# Patient Record
Sex: FEMALE | Race: WHITE | Employment: OTHER | URBAN - METROPOLITAN AREA
[De-identification: names, ages, dates, MRNs, and addresses within clinical notes are randomized per-mention and may not be internally consistent; named-entity substitution may affect disease eponyms.]

---

## 2017-01-31 ENCOUNTER — TELEPHONE (OUTPATIENT)
Dept: NEUROLOGY | Facility: CLINIC | Age: 77
End: 2017-01-31

## 2017-01-31 NOTE — TELEPHONE ENCOUNTER
Trice Han sent a note to Dr Refugio Cavazos him that her back is much improved since her December 20, 2016 injection. She has a follow up appt scheduled for May 2017.   Note put in nurses bin

## 2017-01-31 NOTE — TELEPHONE ENCOUNTER
Note states\"back is much improved since injection on Dec 20. I have appt at the beginning of May when I return. \" Bilateral L5 TFESI on 12/20/16. NOV 5/8/17.

## 2017-05-03 ENCOUNTER — TELEPHONE (OUTPATIENT)
Dept: NEUROLOGY | Facility: CLINIC | Age: 77
End: 2017-05-03

## 2017-05-03 NOTE — TELEPHONE ENCOUNTER
Patient informed she will be evaluated on 5/8/17 and if Dr. Brit Shaffer determines she need an injection at that time once authorization is obtained we will schedule her. Patient expressed understanding.

## 2017-05-08 ENCOUNTER — OFFICE VISIT (OUTPATIENT)
Dept: NEUROLOGY | Facility: CLINIC | Age: 77
End: 2017-05-08

## 2017-05-08 VITALS
DIASTOLIC BLOOD PRESSURE: 88 MMHG | HEART RATE: 71 BPM | BODY MASS INDEX: 24.29 KG/M2 | RESPIRATION RATE: 17 BRPM | OXYGEN SATURATION: 98 % | HEIGHT: 62 IN | SYSTOLIC BLOOD PRESSURE: 160 MMHG | WEIGHT: 132 LBS

## 2017-05-08 DIAGNOSIS — M48.061 LUMBAR FORAMINAL STENOSIS: ICD-10-CM

## 2017-05-08 DIAGNOSIS — M43.16 SPONDYLOLISTHESIS OF LUMBAR REGION: ICD-10-CM

## 2017-05-08 DIAGNOSIS — M43.16 SPONDYLOLISTHESIS, LUMBAR REGION: ICD-10-CM

## 2017-05-08 DIAGNOSIS — M48.061 LUMBAR STENOSIS: ICD-10-CM

## 2017-05-08 DIAGNOSIS — M54.17 LUMBOSACRAL RADICULOPATHY AT L5: Primary | ICD-10-CM

## 2017-05-08 DIAGNOSIS — M51.26 LUMBAR HERNIATED DISC: ICD-10-CM

## 2017-05-08 PROCEDURE — 99214 OFFICE O/P EST MOD 30 MIN: CPT | Performed by: PHYSICAL MEDICINE & REHABILITATION

## 2017-05-08 RX ORDER — HYDROCODONE BITARTRATE AND ACETAMINOPHEN 5; 325 MG/1; MG/1
1 TABLET ORAL EVERY 4 HOURS PRN
COMMUNITY
End: 2017-08-21

## 2017-05-08 RX ORDER — HYDROCODONE BITARTRATE AND ACETAMINOPHEN 5; 325 MG/1; MG/1
1 TABLET ORAL EVERY 8 HOURS PRN
Qty: 90 TABLET | Refills: 0 | Status: SHIPPED | OUTPATIENT
Start: 2017-07-07 | End: 2017-07-17

## 2017-05-08 RX ORDER — MONTELUKAST SODIUM 10 MG/1
TABLET ORAL
Refills: 3 | COMMUNITY
Start: 2017-03-15 | End: 2019-10-09 | Stop reason: ALTCHOICE

## 2017-05-08 RX ORDER — HYDROCODONE BITARTRATE AND ACETAMINOPHEN 5; 325 MG/1; MG/1
1 TABLET ORAL EVERY 8 HOURS PRN
Qty: 90 TABLET | Refills: 0 | Status: SHIPPED | OUTPATIENT
Start: 2017-06-07 | End: 2017-08-21

## 2017-05-08 RX ORDER — DESVENLAFAXINE 50 MG/1
50 TABLET, EXTENDED RELEASE ORAL
Refills: 2 | COMMUNITY
Start: 2017-03-20 | End: 2017-05-08

## 2017-05-08 RX ORDER — HYDROCODONE BITARTRATE AND ACETAMINOPHEN 5; 325 MG/1; MG/1
1 TABLET ORAL EVERY 8 HOURS PRN
Qty: 90 TABLET | Refills: 0 | Status: SHIPPED | OUTPATIENT
Start: 2017-05-08 | End: 2017-11-16

## 2017-05-08 NOTE — PROGRESS NOTES
Patient has been scheduled for a bilateral L5 TFESIs  on 5/12/2017 at the [facility]. Medications and allergies reviewed. Patient informed to hold aspirins, nsaids, blood thinners, vitamins and fish oils 5-7 days prior to procedure.  Patient informed we will need

## 2017-05-08 NOTE — PROGRESS NOTES
HPI:   Kian Lee is a 68year old female.     Patient presents with:  Low Back Pain: pt had back injection 12/20/16, LOV 10/28/16, c/o lower back pain at the time, started with pain again x 1 mon and is gradually getting worse, pt would like an appt f bladder     Musculoskeletal: Positive for back pain. Skin: Negative for itching and rash. Neurological: Negative for tingling, sensory change and headaches. Endo/Heme/Allergies: Positive for environmental allergies.    Psychiatric/Behavioral: Negative FOREARM/WRIST SURGERY UNLISTED Right     Comment remove plate from wrist        Social History   Marital Status:   Spouse Name: N/A    Years of Education: N/A  Number of Children: N/A     Occupational History  None on file     Social History Main To Disp: 90 tablet Rfl: 1   Metoprolol Succinate  MG Oral Tablet 24 Hr Take 1 tablet (100 mg total) by mouth once daily. Disp: 90 tablet Rfl: 3   celecoxib (CELEBREX) 200 MG Oral Cap Take 1 capsule (200 mg total) by mouth daily.  Disp: 90 capsule Rfl: 3 reactive to light. No redness or discharge bilaterally. Skin:  There are no rashes or lesions.     Lymph Nodes:  The patient has no palpable submandibular, supraclavicular, and cervical lymph nodes    Lumbar Spine:    Scoliosis: Thoracic levoscoliosis th foraminal stenosis    5. L4-5 mod central HNP, L1-2 left mod HNP    6. Spondylolisthesis, lumbar region        Plan:   1. Schedule Bilateral L5 TESI under fluroscopy for Friday 5/12/2017  2. Pt has been off all nsaid asa vitamins x 1 week  3.  Refill hydro-

## 2017-05-12 ENCOUNTER — OFFICE VISIT (OUTPATIENT)
Dept: SURGERY | Facility: CLINIC | Age: 77
End: 2017-05-12

## 2017-05-12 DIAGNOSIS — M48.061 LUMBAR STENOSIS: ICD-10-CM

## 2017-05-12 DIAGNOSIS — M54.17 LUMBOSACRAL RADICULOPATHY AT L5: Primary | ICD-10-CM

## 2017-05-12 PROCEDURE — 64483 NJX AA&/STRD TFRM EPI L/S 1: CPT | Performed by: PHYSICAL MEDICINE & REHABILITATION

## 2017-05-12 NOTE — PROCEDURES
[address]    BILATERAL LUMBAR TRANSFORAMINAL   NAME:  Jai Romo    MR #:    [id number] :  1940     PHYSICIAN:  Karlene Wheatley        Operative Report    DATE OF PROCEDURE: 2017   PREOPERATIVE DIAGNOSES: 1. left > patient's skin was anesthetized with 1 to 2 cc of 50:50 mixture of sodium bicarbonate and 1% PF lidocaine without epinephrine. Then, a 3.5 inch, 22-gauge spinal needle was inserted and directed towards the left L5-S1 intervertebral foramen.   When it felt

## 2017-06-13 ENCOUNTER — HOSPITAL ENCOUNTER (OUTPATIENT)
Dept: MAMMOGRAPHY | Age: 77
Discharge: HOME | End: 2017-06-13
Attending: OBSTETRICS & GYNECOLOGY
Payer: MEDICARE

## 2017-06-13 DIAGNOSIS — Z12.31 ENCOUNTER FOR SCREENING MAMMOGRAM FOR MALIGNANT NEOPLASM OF BREAST: ICD-10-CM

## 2017-06-13 PROCEDURE — 77067 SCR MAMMO BI INCL CAD: CPT | Performed by: OBSTETRICS & GYNECOLOGY

## 2017-08-21 RX ORDER — HYDROCODONE BITARTRATE AND ACETAMINOPHEN 5; 325 MG/1; MG/1
1 TABLET ORAL EVERY 8 HOURS PRN
Qty: 90 TABLET | Refills: 0 | Status: SHIPPED | OUTPATIENT
Start: 2017-08-21 | End: 2017-11-03 | Stop reason: ALTCHOICE

## 2017-08-21 NOTE — TELEPHONE ENCOUNTER
Per [facility] refill policy one month supply on narcotics unless otherwise specified by prescribing physician    Medication request: Norco 5-325mg q8h prn pain    LOV 5/8/17 had bilateral L5 TFESIs on 5/12/17 (follow up in 3 months)  No follow up scheduled    [place]

## 2017-08-25 NOTE — TELEPHONE ENCOUNTER
Patient here to  prescription. Patient states \"I have about had it, I need a 3 month supply, I am going on vacation to my summer house in [place] and I am in pain. \"  I advised patient Dr Nba Ko wrote prescription for 1 month supply and will relay

## 2017-08-28 RX ORDER — HYDROCODONE BITARTRATE AND ACETAMINOPHEN 5; 325 MG/1; MG/1
1 TABLET ORAL EVERY 8 HOURS PRN
Qty: 90 TABLET | Refills: 0 | Status: SHIPPED | OUTPATIENT
Start: 2017-08-28 | End: 2017-08-29 | Stop reason: RX

## 2017-08-28 RX ORDER — HYDROCODONE BITARTRATE AND ACETAMINOPHEN 5; 325 MG/1; MG/1
1 TABLET ORAL EVERY 8 HOURS PRN
Qty: 90 TABLET | Refills: 0 | Status: SHIPPED | OUTPATIENT
Start: 2017-10-20 | End: 2017-11-03 | Stop reason: ALTCHOICE

## 2017-08-29 NOTE — TELEPHONE ENCOUNTER
There was an error with the start date on the prescription dated 8/28/17. Date changed to reflect correct start date. Incorrect rx shredded appropriately.

## 2017-08-31 ENCOUNTER — TELEPHONE (OUTPATIENT)
Dept: NEUROLOGY | Facility: CLINIC | Age: 77
End: 2017-08-31

## 2017-08-31 DIAGNOSIS — M51.26 BULGING LUMBAR DISC: ICD-10-CM

## 2017-08-31 DIAGNOSIS — M48.061 LUMBAR STENOSIS: ICD-10-CM

## 2017-08-31 DIAGNOSIS — M54.17 LUMBOSACRAL RADICULOPATHY AT L5: Primary | ICD-10-CM

## 2017-08-31 DIAGNOSIS — M43.16 SPONDYLOLISTHESIS OF LUMBAR REGION: ICD-10-CM

## 2017-08-31 DIAGNOSIS — M48.061 LUMBAR FORAMINAL STENOSIS: ICD-10-CM

## 2017-08-31 RX ORDER — HYDROCODONE BITARTRATE AND ACETAMINOPHEN 5; 325 MG/1; MG/1
1 TABLET ORAL EVERY 8 HOURS PRN
Qty: 90 TABLET | Refills: 0 | Status: SHIPPED | OUTPATIENT
Start: 2017-09-20 | End: 2017-10-20

## 2017-09-08 ENCOUNTER — TELEPHONE (OUTPATIENT)
Dept: NEUROLOGY | Facility: CLINIC | Age: 77
End: 2017-09-08

## 2017-09-08 NOTE — TELEPHONE ENCOUNTER
Medicare Online for insurance coverage of bilateral L5 TFESIs cpt codes [id number], [id number]. Insurance was verified and procedure is a covered benefit and does not require authorization. Will inform Nursing.

## 2017-09-08 NOTE — TELEPHONE ENCOUNTER
Pt called stated that she is having recurrent low back pain. Pain origins in low back to both hips worsen on left. Pt stated that pain is constant varies in degree. Pt is using Norco 3-3.5 tabs per day.  Pt would like to schedule injection end of Sept, 09/2

## 2017-09-11 NOTE — TELEPHONE ENCOUNTER
Patient has been scheduled for a bilateral L5 TFESIs  on 9/29/17 at the [facility]. Medications and allergies reviewed. Patient informed to hold aspirins, nsaids, blood thinners, vitamins and fish oils 5-7 days prior to procedure.  Patient informed we will need v

## 2017-09-15 NOTE — TELEPHONE ENCOUNTER
She called a couple days ago to try to move up her injection. She hasn't gotten a return call. She is hoping for her injection to be moved to 9-22-17, if possible.   Call [phone]

## 2017-09-15 NOTE — TELEPHONE ENCOUNTER
Patient informed she will be placed on wait list and if sooner appt for injection becomes available we will notify her. Patient verbalized understanding.

## 2017-09-21 ENCOUNTER — TELEPHONE (OUTPATIENT)
Dept: NEUROLOGY | Facility: CLINIC | Age: 77
End: 2017-09-21

## 2017-09-29 ENCOUNTER — OFFICE VISIT (OUTPATIENT)
Dept: SURGERY | Facility: CLINIC | Age: 77
End: 2017-09-29

## 2017-09-29 DIAGNOSIS — M48.061 LUMBAR STENOSIS: ICD-10-CM

## 2017-09-29 DIAGNOSIS — M54.17 LUMBOSACRAL RADICULOPATHY AT L5: Primary | ICD-10-CM

## 2017-09-29 PROCEDURE — 64483 NJX AA&/STRD TFRM EPI L/S 1: CPT | Performed by: PHYSICAL MEDICINE & REHABILITATION

## 2017-09-29 NOTE — PROCEDURES
[address]j [address].    BILATERAL LUMBAR TRANSFORAMINAL   NAME:  Amy Thomas    MR #:    [id number] :  1940     PHYSICIAN:  Nic Wheatley        Operative Report    DATE OF PROCEDURE: 2017   PREOPERATIVE DIAGNOSES: 1. left > patient's skin was anesthetized with 1 to 2 cc of 50:50 mixture of sodium bicarbonate and 1% PF lidocaine without epinephrine. Then, a 3.5 inch, 22-gauge spinal needle was inserted and directed towards the left L5-S1 intervertebral foramen.   When it felt

## 2017-11-03 PROBLEM — E66.3 OVERWEIGHT (BMI 25.0-29.9): Status: ACTIVE | Noted: 2017-11-03

## 2017-11-16 NOTE — TELEPHONE ENCOUNTER
Per [facility] refill policy there are only one month supply of refills at a time for narcotic medications unless otherwise specified by prescribing physician    Medication request: Norco 5-325mg q8h prn pain    LOV 5/12/17 had bilateral L5 TFESIs on 9/29/17  NOV

## 2017-11-17 RX ORDER — HYDROCODONE BITARTRATE AND ACETAMINOPHEN 5; 325 MG/1; MG/1
1 TABLET ORAL EVERY 8 HOURS PRN
Qty: 90 TABLET | Refills: 0 | Status: SHIPPED | OUTPATIENT
Start: 2017-11-25 | End: 2017-12-07

## 2017-12-07 ENCOUNTER — TELEPHONE (OUTPATIENT)
Dept: NEUROLOGY | Facility: CLINIC | Age: 77
End: 2017-12-07

## 2017-12-07 ENCOUNTER — OFFICE VISIT (OUTPATIENT)
Dept: NEUROLOGY | Facility: CLINIC | Age: 77
End: 2017-12-07

## 2017-12-07 VITALS
WEIGHT: 135 LBS | DIASTOLIC BLOOD PRESSURE: 100 MMHG | HEART RATE: 80 BPM | RESPIRATION RATE: 14 BRPM | BODY MASS INDEX: 27 KG/M2 | SYSTOLIC BLOOD PRESSURE: 140 MMHG

## 2017-12-07 DIAGNOSIS — M54.17 LUMBOSACRAL RADICULOPATHY AT L5: Primary | ICD-10-CM

## 2017-12-07 DIAGNOSIS — M51.26 LUMBAR HERNIATED DISC: ICD-10-CM

## 2017-12-07 DIAGNOSIS — M43.10 RETROLISTHESIS OF VERTEBRAE: ICD-10-CM

## 2017-12-07 DIAGNOSIS — M51.26 BULGING LUMBAR DISC: ICD-10-CM

## 2017-12-07 DIAGNOSIS — M48.062 SPINAL STENOSIS OF LUMBAR REGION WITH NEUROGENIC CLAUDICATION: ICD-10-CM

## 2017-12-07 DIAGNOSIS — M43.16 SPONDYLOLISTHESIS OF LUMBAR REGION: ICD-10-CM

## 2017-12-07 DIAGNOSIS — M48.061 LUMBAR FORAMINAL STENOSIS: ICD-10-CM

## 2017-12-07 PROCEDURE — 99214 OFFICE O/P EST MOD 30 MIN: CPT | Performed by: PHYSICAL MEDICINE & REHABILITATION

## 2017-12-07 RX ORDER — HYDROCODONE BITARTRATE AND ACETAMINOPHEN 5; 325 MG/1; MG/1
1 TABLET ORAL EVERY 8 HOURS PRN
Qty: 90 TABLET | Refills: 0 | Status: SHIPPED | OUTPATIENT
Start: 2018-02-23 | End: 2018-03-20

## 2017-12-07 RX ORDER — HYDROCODONE BITARTRATE AND ACETAMINOPHEN 5; 325 MG/1; MG/1
1 TABLET ORAL EVERY 8 HOURS PRN
Qty: 90 TABLET | Refills: 0 | Status: SHIPPED | OUTPATIENT
Start: 2017-12-25 | End: 2018-03-20

## 2017-12-07 RX ORDER — HYDROCODONE BITARTRATE AND ACETAMINOPHEN 5; 325 MG/1; MG/1
1 TABLET ORAL EVERY 8 HOURS PRN
Qty: 90 TABLET | Refills: 0 | Status: SHIPPED | OUTPATIENT
Start: 2018-01-24 | End: 2018-02-23

## 2017-12-07 NOTE — TELEPHONE ENCOUNTER
Medicare Online for insurance coverage of bilateral L5 TFESIs cpt codes [id number], [id number]. Insurance was verified and procedure is a covered benefit and does not require authorization. Pt. is scheduled for procedure on 12/19/17. Will inform Nursing.

## 2017-12-07 NOTE — PROGRESS NOTES
Low Back Pain H & P    Chief Complaint: Patient presents with:  Low Back Pain: pt here for follow up after Bilateral L5 transforaminal epidural steroid injections on 9/29/17 with 80% relief that has just started to come back.  pain is located in the lower b Right      Comment: remove plate from wrist   4/2215: [facility] NEEDLE LOCALIZATION W/ SPECIMEN 1 SITE RIG*  No date: OTHER SURGICAL HISTORY      Comment: ankle ligament repair  No date: OTHER SURGICAL HISTORY      Comment: deviated septum repair as child  No da History Narrative    Does use assistive device. Uses cane some of the time. Splint to r wrist. Soft collar neck. Has 12 stairs in home which she must use. Some days are more difficult than others. Review of Systems      PE:   The patient does 5. L5-S1 left severe/right mod-severe, L4-5 right mod foraminal stenosis    6. L5-S1 left mild-mod/rightmod-large foraminal, L3-4 right mod far lateral, L2-3 right mod bulging discs    7.  L4-5 grade 2 spondylolisthesis          Plan  I will perform bilat

## 2017-12-07 NOTE — PATIENT INSTRUCTIONS
As of October 6th 2014, the Drug Enforcement Agency [facility]) is reclassifying all hydrocodone combination medications from Schedule III to Schedule II. This includes medications such as Norco, Vicodin, Lortab, Zohydro, and Vicoprofen.     What this means for y will perform bilateral L5 TFESI(s). She will continue with the Ti [place] and the massage therapy. The patient will continue with their current pain medications. She will follow up in April or May of 2018.

## 2017-12-07 NOTE — PROGRESS NOTES
Patient has been scheduled for a bilateral L5 TFESI  on 12/191/7 at the [facility]. Medications and allergies reviewed. Patient informed to hold aspirins, nsaids, blood thinners, vitamins and fish oils 5-7 days prior to procedure.  Patient informed we will need v

## 2017-12-19 ENCOUNTER — OFFICE VISIT (OUTPATIENT)
Dept: SURGERY | Facility: CLINIC | Age: 77
End: 2017-12-19

## 2017-12-19 DIAGNOSIS — M51.26 LUMBAR HERNIATED DISC: ICD-10-CM

## 2017-12-19 DIAGNOSIS — M48.062 SPINAL STENOSIS OF LUMBAR REGION WITH NEUROGENIC CLAUDICATION: ICD-10-CM

## 2017-12-19 DIAGNOSIS — M54.17 LUMBOSACRAL RADICULOPATHY AT L5: Primary | ICD-10-CM

## 2017-12-19 PROCEDURE — 64483 NJX AA&/STRD TFRM EPI L/S 1: CPT | Performed by: PHYSICAL MEDICINE & REHABILITATION

## 2017-12-19 NOTE — PROCEDURES
[address].    BILATERAL LUMBAR TRANSFORAMINAL   NAME:  Jai Romo    MR #:    [id number] :  1940     PHYSICIAN:  Karlene Wheatley        Operative Report    DATE OF PROCEDURE: 2017   PREOPERATIVE DIAGNOSES: 1. left anterior obliqued opening up the left L5-S1 intervertebral foramen. At this point in time, the patient's skin was anesthetized with 1 to 2 cc of 50:50 mixture of sodium bicarbonate and 1% PF lidocaine without epinephrine.   Then, a 3.5 inch, 22-gauge spina

## 2018-02-21 ENCOUNTER — TELEPHONE (OUTPATIENT)
Dept: NEUROLOGY | Facility: CLINIC | Age: 78
End: 2018-02-21

## 2018-02-21 NOTE — TELEPHONE ENCOUNTER
I was paged by pharmacist at [facility] in [address] [facility] about he patient having a Norco prescription that was not signed. She was given by our office a prescription dated for 2/23/18.

## 2018-03-20 ENCOUNTER — TELEPHONE (OUTPATIENT)
Dept: NEUROLOGY | Facility: CLINIC | Age: 78
End: 2018-03-20

## 2018-03-20 NOTE — TELEPHONE ENCOUNTER
Medication request: Norco 5-325mg q8h prn pain    LOV 12/7/18-had inj 12/19/18  NOV 5/3/18    [place] 3/2/18 #90 (Cachorro Avalos)

## 2018-03-20 NOTE — TELEPHONE ENCOUNTER
Pt states that she is out of town until the end of April and discussed with Dr. Tarsha Ruelas up 2 Rxs for Con Stein, one being post dated for next month, please advise           Pts son Nic Alegre will  Rx

## 2018-03-21 NOTE — TELEPHONE ENCOUNTER
Patient notified she will need to be evaluated in office by Dr. Elmer Wright first before injection can be scheduled.  Patient verbalized understanding and has appt 5/3/18 and if injection is ordered patient will be scheduled once we receive insurance approval to

## 2018-03-22 RX ORDER — HYDROCODONE BITARTRATE AND ACETAMINOPHEN 5; 325 MG/1; MG/1
1 TABLET ORAL EVERY 8 HOURS PRN
Qty: 90 TABLET | Refills: 0 | Status: SHIPPED | OUTPATIENT
Start: 2018-04-01 | End: 2018-05-01

## 2018-03-22 RX ORDER — HYDROCODONE BITARTRATE AND ACETAMINOPHEN 5; 325 MG/1; MG/1
1 TABLET ORAL EVERY 8 HOURS PRN
Qty: 90 TABLET | Refills: 0 | Status: SHIPPED | OUTPATIENT
Start: 2018-05-01 | End: 2018-05-03

## 2018-05-03 ENCOUNTER — TELEPHONE (OUTPATIENT)
Dept: NEUROLOGY | Facility: CLINIC | Age: 78
End: 2018-05-03

## 2018-05-03 ENCOUNTER — OFFICE VISIT (OUTPATIENT)
Dept: NEUROLOGY | Facility: CLINIC | Age: 78
End: 2018-05-03

## 2018-05-03 VITALS
HEIGHT: 61 IN | WEIGHT: 132 LBS | BODY MASS INDEX: 24.92 KG/M2 | DIASTOLIC BLOOD PRESSURE: 80 MMHG | RESPIRATION RATE: 18 BRPM | SYSTOLIC BLOOD PRESSURE: 134 MMHG | HEART RATE: 63 BPM

## 2018-05-03 DIAGNOSIS — M48.062 SPINAL STENOSIS OF LUMBAR REGION WITH NEUROGENIC CLAUDICATION: ICD-10-CM

## 2018-05-03 DIAGNOSIS — M43.16 SPONDYLOLISTHESIS OF LUMBAR REGION: ICD-10-CM

## 2018-05-03 DIAGNOSIS — M54.17 LUMBOSACRAL RADICULOPATHY AT L5: Primary | ICD-10-CM

## 2018-05-03 DIAGNOSIS — M51.26 BULGING LUMBAR DISC: ICD-10-CM

## 2018-05-03 DIAGNOSIS — M51.26 LUMBAR HERNIATED DISC: ICD-10-CM

## 2018-05-03 DIAGNOSIS — M43.10 RETROLISTHESIS OF VERTEBRAE: ICD-10-CM

## 2018-05-03 DIAGNOSIS — M48.061 LUMBAR FORAMINAL STENOSIS: ICD-10-CM

## 2018-05-03 PROCEDURE — 99214 OFFICE O/P EST MOD 30 MIN: CPT | Performed by: PHYSICAL MEDICINE & REHABILITATION

## 2018-05-03 RX ORDER — HYDROCODONE BITARTRATE AND ACETAMINOPHEN 5; 325 MG/1; MG/1
1 TABLET ORAL EVERY 8 HOURS PRN
Qty: 90 TABLET | Refills: 0 | Status: SHIPPED | OUTPATIENT
Start: 2018-06-03 | End: 2018-05-07

## 2018-05-03 RX ORDER — HYDROCODONE BITARTRATE AND ACETAMINOPHEN 5; 325 MG/1; MG/1
1 TABLET ORAL EVERY 8 HOURS PRN
Qty: 90 TABLET | Refills: 0 | Status: SHIPPED | OUTPATIENT
Start: 2018-08-02 | End: 2018-08-27

## 2018-05-03 RX ORDER — HYDROCODONE BITARTRATE AND ACETAMINOPHEN 5; 325 MG/1; MG/1
1 TABLET ORAL EVERY 8 HOURS PRN
Qty: 90 TABLET | Refills: 0 | Status: SHIPPED | OUTPATIENT
Start: 2018-07-03 | End: 2018-05-07
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
## 2018-05-03 NOTE — TELEPHONE ENCOUNTER
Medicare Online for insurance coverage of left L4 & L5 TFESIs cpt codes [id number], [id number], [id number]. Insurance was verified and procedure  is a covered benefit and does not require authorization. Pt. is scheduled for 05/04/18. .  Will inform Nursing.

## 2018-05-03 NOTE — PROGRESS NOTES
Patient was scheduled for left L4 and L5 TFESI on Friday, May 4, 2018. Medications and allergies reviewed. Patient informed to hold aspirins, nsaids, blood thinners, vitamins and fish oils 7 days prior to procedure.  Patient informed to hold metformin/xiao

## 2018-05-04 ENCOUNTER — OFFICE VISIT (OUTPATIENT)
Dept: SURGERY | Facility: CLINIC | Age: 78
End: 2018-05-04

## 2018-05-04 DIAGNOSIS — M51.26 LUMBAR HERNIATED DISC: ICD-10-CM

## 2018-05-04 DIAGNOSIS — M48.061 LUMBAR FORAMINAL STENOSIS: ICD-10-CM

## 2018-05-04 DIAGNOSIS — M54.17 LUMBOSACRAL RADICULOPATHY AT L5: Primary | ICD-10-CM

## 2018-05-04 PROCEDURE — 64484 NJX AA&/STRD TFRM EPI L/S EA: CPT | Performed by: PHYSICAL MEDICINE & REHABILITATION

## 2018-05-04 PROCEDURE — 64483 NJX AA&/STRD TFRM EPI L/S 1: CPT | Performed by: PHYSICAL MEDICINE & REHABILITATION

## 2018-05-04 NOTE — PROCEDURES
[address] WOL[address].    2-LEVEL LUMBAR TRANSFORAMINAL   NAME:  Todd Xavier    MR #:    [id number] :  1940     PHYSICIAN:  Collette Footman A. Couri        Operative Report    DATE OF PROCEDURE: 2018   PREOPERATIVE DIAGNOSES: 1. left > ri solution of 1.5 cc of 40 mg/cc of Kenalog and 1.5 cc of 1% PF lidocaine without epinephrine at each site. After this, the needles were removed. Each site was cleaned. Band-Aids were applied. The patient was transferred to the cart and into [facility].   The pa

## 2018-05-21 ENCOUNTER — TELEPHONE (OUTPATIENT)
Dept: NEUROLOGY | Facility: CLINIC | Age: 78
End: 2018-05-21

## 2018-05-21 NOTE — TELEPHONE ENCOUNTER
Patient calling with condition update post left L4 and L5 TFESIs 5/4/18  Patient states she received 80-85% relief in symptoms. Denies pain in the thoracic back but has minimal pain in the left hip and leg with certain activity.   Patient states she has sta

## 2018-07-11 ENCOUNTER — TELEPHONE (OUTPATIENT)
Dept: NEUROLOGY | Facility: CLINIC | Age: 78
End: 2018-07-11

## 2018-07-11 NOTE — TELEPHONE ENCOUNTER
Patient received 3 month supply on 5/3/18. Patient has norco 5/325 mg dated to start 8/2/18. No additional refills at this time.

## 2018-07-11 NOTE — TELEPHONE ENCOUNTER
Rafael Cassa pharmacist who states patient tried to fill a prescription for Norco 5-325mg #90 that was written 5/3/18 but per [place] law they are unable to fill prescriptions for controlled medications 60 days from the date it was written.

## 2018-07-17 ENCOUNTER — MED REC SCAN ONLY (OUTPATIENT)
Dept: NEUROLOGY | Facility: CLINIC | Age: 78
End: 2018-07-17

## 2018-08-23 ENCOUNTER — TELEPHONE (OUTPATIENT)
Dept: NEUROLOGY | Facility: CLINIC | Age: 78
End: 2018-08-23

## 2018-08-23 NOTE — TELEPHONE ENCOUNTER
Patient calling requesting a steroid injection as soon as possible. Patient's NOV is 8/27/18. Patient was informed that Dr. Jamee Ríos will evaluate her pain at [facility] and determine which injection to schedule. Spot has been held for patient at [facility] on 9/7/18.  Kodi Baca

## 2018-08-27 ENCOUNTER — OFFICE VISIT (OUTPATIENT)
Dept: NEUROLOGY | Facility: CLINIC | Age: 78
End: 2018-08-27
Payer: MEDICARE

## 2018-08-27 ENCOUNTER — TELEPHONE (OUTPATIENT)
Dept: NEUROLOGY | Facility: CLINIC | Age: 78
End: 2018-08-27

## 2018-08-27 VITALS — HEART RATE: 68 BPM | SYSTOLIC BLOOD PRESSURE: 122 MMHG | DIASTOLIC BLOOD PRESSURE: 70 MMHG | RESPIRATION RATE: 14 BRPM

## 2018-08-27 DIAGNOSIS — M43.10 RETROLISTHESIS OF VERTEBRAE: ICD-10-CM

## 2018-08-27 DIAGNOSIS — M54.17 LUMBOSACRAL RADICULOPATHY AT L5: Primary | ICD-10-CM

## 2018-08-27 DIAGNOSIS — M43.16 SPONDYLOLISTHESIS OF LUMBAR REGION: ICD-10-CM

## 2018-08-27 DIAGNOSIS — M48.062 SPINAL STENOSIS OF LUMBAR REGION WITH NEUROGENIC CLAUDICATION: ICD-10-CM

## 2018-08-27 DIAGNOSIS — M51.26 BULGING LUMBAR DISC: ICD-10-CM

## 2018-08-27 DIAGNOSIS — M48.061 LUMBAR FORAMINAL STENOSIS: ICD-10-CM

## 2018-08-27 DIAGNOSIS — Z96.642 STATUS POST LEFT HIP REPLACEMENT: ICD-10-CM

## 2018-08-27 PROCEDURE — 99214 OFFICE O/P EST MOD 30 MIN: CPT | Performed by: PHYSICAL MEDICINE & REHABILITATION

## 2018-08-27 RX ORDER — HYDROCODONE BITARTRATE AND ACETAMINOPHEN 5; 325 MG/1; MG/1
1 TABLET ORAL EVERY 8 HOURS PRN
Qty: 90 TABLET | Refills: 0 | Status: SHIPPED | OUTPATIENT
Start: 2018-08-27 | End: 2018-09-26

## 2018-08-27 RX ORDER — HYDROCODONE BITARTRATE AND ACETAMINOPHEN 5; 325 MG/1; MG/1
1 TABLET ORAL EVERY 8 HOURS PRN
Qty: 90 TABLET | Refills: 0 | Status: SHIPPED | OUTPATIENT
Start: 2018-09-26 | End: 2018-10-26

## 2018-08-27 RX ORDER — HYDROCODONE BITARTRATE AND ACETAMINOPHEN 5; 325 MG/1; MG/1
1 TABLET ORAL EVERY 8 HOURS PRN
Qty: 90 TABLET | Refills: 0 | Status: SHIPPED | OUTPATIENT
Start: 2018-10-26 | End: 2018-11-25

## 2018-08-27 NOTE — TELEPHONE ENCOUNTER
Medicare Online for insurance coverage of bilateral L5 TFESIs cpt codes [id number], [id number]. Insurance was verified and procedure is a covered benefit and does not require authorization. Procedure is scheduled on 09/07/18.  Will inform Nursing

## 2018-08-27 NOTE — PROGRESS NOTES
Low Back Pain H & P    Chief Complaint: Patient presents with:  Low Back Pain: pt here for follow up after Left L4 and L5 transforaminal epidural steroid injection on 5/4/18 with 100% relief for 3 weeks then symptoms slowly returned.  fell a couple months a Constipation    • HYPERLIPIDEMIA    • HYPERTENSION    • OSTEOPOROSIS     lumbar spine   • OTHER DISEASES     lumbar spinal stenosis   • OTHER DISEASES     L4/L5 spondlyolisthesis   • OTHER DISEASES     thoracic DJD   • OTHER DISEASES     fibrocystic breast Father    • Pulmonary Disease Father    • Hypertension Father    • Cancer Father      lymphoma   • Breast Cancer Paternal Aunt      post menopause       Social History     Social History  Social History   Marital status:   Spouse name: N/A    Years Hallucis Longus RIGHT:   4-/5  Extensor Hallucis Longus LEFT:   4-/5   RIGHT plantar reflexes: downward response   LEFT plantar reflexes: downward response   Reflexes: 2+ in bilateral lower extremities     Assessment  1. left > right L5 radiculopathy    2.

## 2018-08-27 NOTE — PATIENT INSTRUCTIONS
As of October 6th 2014, the Drug Enforcement Agency [facility]) is reclassifying all hydrocodone combination medications from Schedule III to Schedule II. This includes medications such as Norco, Vicodin, Lortab, Zohydro, and Vicoprofen.     What this means for y will perform bilateral L5 TFESI(s). The patient will continue with her home exercise program.    The patient will continue with their current pain medications.     The patient will follow up in 3 months, but the patient will call me 2 weeks after having

## 2018-08-27 NOTE — TELEPHONE ENCOUNTER
Patient has been scheduled for bilateral L5 TFESIs on 9/7/18 per telephone encounter on 8/23/18. Patient has been scheduled for a bilateral L5 TFESIs on 9/7/18 at the [facility]. Medications and allergies reviewed.  Patient informed to hold aspirins, nsaids, b

## 2018-09-07 ENCOUNTER — OFFICE VISIT (OUTPATIENT)
Dept: SURGERY | Facility: CLINIC | Age: 78
End: 2018-09-07
Payer: MEDICARE

## 2018-09-07 DIAGNOSIS — M48.062 SPINAL STENOSIS OF LUMBAR REGION WITH NEUROGENIC CLAUDICATION: ICD-10-CM

## 2018-09-07 DIAGNOSIS — M51.26 LUMBAR HERNIATED DISC: ICD-10-CM

## 2018-09-07 DIAGNOSIS — M51.26 BULGING LUMBAR DISC: ICD-10-CM

## 2018-09-07 DIAGNOSIS — M54.17 LUMBOSACRAL RADICULOPATHY AT L5: Primary | ICD-10-CM

## 2018-09-07 PROCEDURE — 64483 NJX AA&/STRD TFRM EPI L/S 1: CPT | Performed by: PHYSICAL MEDICINE & REHABILITATION

## 2018-09-07 NOTE — PROCEDURES
[address]    BILATERAL LUMBAR TRANSFORAMINAL   NAME:  Aliyah Morales    MR #:    [id number] :  1940     PHYSICIAN:  Antonia Wheatley        Operative Report    DATE OF PROCEDURE: 2018   PREOPERATIVE DIAGNOSES: 1. left > aspirated. Then, the patient was injected with a 3 cc solution of 1.5 cc of 40 mg/cc of Kenalog and 1.5 cc of 1% PF lidocaine without epinephrine. After this, the needle was removed.   Then  fluoroscopy was right anterior obliqued opening up the left L5-S

## 2018-11-05 ENCOUNTER — HOSPITAL ENCOUNTER (OUTPATIENT)
Dept: MAMMOGRAPHY | Facility: HOSPITAL | Age: 78
Discharge: HOME | End: 2018-11-05
Attending: OBSTETRICS & GYNECOLOGY
Payer: MEDICARE

## 2018-11-05 DIAGNOSIS — Z12.39 SCREENING FOR MALIGNANT NEOPLASM OF BREAST: ICD-10-CM

## 2018-11-05 PROCEDURE — 77067 SCR MAMMO BI INCL CAD: CPT | Performed by: OBSTETRICS & GYNECOLOGY

## 2018-11-05 PROCEDURE — 77063 BREAST TOMOSYNTHESIS BI: CPT | Performed by: OBSTETRICS & GYNECOLOGY

## 2018-11-12 ENCOUNTER — HOSPITAL ENCOUNTER (OUTPATIENT)
Dept: MAMMOGRAPHY | Facility: HOSPITAL | Age: 78
Discharge: HOME | End: 2018-11-12
Attending: OBSTETRICS & GYNECOLOGY
Payer: MEDICARE

## 2018-11-12 ENCOUNTER — HOSPITAL ENCOUNTER (OUTPATIENT)
Dept: ULTRASOUND IMAGING | Facility: HOSPITAL | Age: 78
Discharge: HOME | End: 2018-11-12
Attending: INTERNAL MEDICINE
Payer: MEDICARE

## 2018-11-12 DIAGNOSIS — R92.8 ABNORMAL MAMMOGRAM: ICD-10-CM

## 2018-11-12 DIAGNOSIS — Z01.818 PREOPERATIVE EXAMINATION: ICD-10-CM

## 2018-11-12 PROCEDURE — 76642 ULTRASOUND BREAST LIMITED: CPT | Performed by: INTERNAL MEDICINE

## 2018-11-12 PROCEDURE — 77065 DX MAMMO INCL CAD UNI: CPT | Performed by: OBSTETRICS & GYNECOLOGY

## 2018-11-12 PROCEDURE — 77061 BREAST TOMOSYNTHESIS UNI: CPT | Performed by: OBSTETRICS & GYNECOLOGY

## 2018-11-27 ENCOUNTER — TELEPHONE (OUTPATIENT)
Dept: NEUROLOGY | Facility: CLINIC | Age: 78
End: 2018-11-27

## 2018-11-27 ENCOUNTER — OFFICE VISIT (OUTPATIENT)
Dept: NEUROLOGY | Facility: CLINIC | Age: 78
End: 2018-11-27
Payer: MEDICARE

## 2018-11-27 VITALS
SYSTOLIC BLOOD PRESSURE: 132 MMHG | BODY MASS INDEX: 24.55 KG/M2 | DIASTOLIC BLOOD PRESSURE: 86 MMHG | HEIGHT: 61 IN | HEART RATE: 80 BPM | WEIGHT: 130 LBS

## 2018-11-27 DIAGNOSIS — M51.26 BULGING LUMBAR DISC: ICD-10-CM

## 2018-11-27 DIAGNOSIS — M43.10 RETROLISTHESIS OF VERTEBRAE: ICD-10-CM

## 2018-11-27 DIAGNOSIS — M43.16 SPONDYLOLISTHESIS OF LUMBAR REGION: ICD-10-CM

## 2018-11-27 DIAGNOSIS — M48.061 LUMBAR FORAMINAL STENOSIS: ICD-10-CM

## 2018-11-27 DIAGNOSIS — M54.17 LUMBOSACRAL RADICULOPATHY AT L5: Primary | ICD-10-CM

## 2018-11-27 DIAGNOSIS — M48.062 SPINAL STENOSIS OF LUMBAR REGION WITH NEUROGENIC CLAUDICATION: ICD-10-CM

## 2018-11-27 DIAGNOSIS — M51.26 LUMBAR HERNIATED DISC: ICD-10-CM

## 2018-11-27 PROCEDURE — 99214 OFFICE O/P EST MOD 30 MIN: CPT | Performed by: PHYSICAL MEDICINE & REHABILITATION

## 2018-11-27 RX ORDER — HYDROCODONE BITARTRATE AND ACETAMINOPHEN 5; 325 MG/1; MG/1
1 TABLET ORAL EVERY 8 HOURS PRN
Qty: 300 TABLET | Refills: 0 | Status: SHIPPED | OUTPATIENT
Start: 2018-12-27 | End: 2019-01-22

## 2018-11-27 RX ORDER — HYDROCODONE BITARTRATE AND ACETAMINOPHEN 5; 325 MG/1; MG/1
1 TABLET ORAL EVERY 6 HOURS PRN
COMMUNITY
End: 2018-11-27

## 2018-11-27 RX ORDER — HYDROCODONE BITARTRATE AND ACETAMINOPHEN 5; 325 MG/1; MG/1
1 TABLET ORAL EVERY 8 HOURS PRN
Qty: 90 TABLET | Refills: 0 | Status: SHIPPED | OUTPATIENT
Start: 2018-11-27 | End: 2018-11-27

## 2018-11-27 NOTE — PATIENT INSTRUCTIONS
Plan  I will perform bilateral L5 TFESI(s). She will continue with the Norco 2-3 tablets in one day for the pain. She will need a 4 month supply of the Norco when [place].     The patient will continue with her home exer

## 2018-11-27 NOTE — PROGRESS NOTES
Low Back Pain H & P    Chief Complaint: Patient presents with:  Low Back Pain: LOV 9-7-18 pt is here to f/u on lower back pain down to the L leg 5/10. Pt states lower back pain have being the same since last office visit takes Paz Lala as needed.  Had [employer] fibroids/hyperplasia       Past Surgical History   Past Surgical History:   Procedure Laterality Date   • CARPAL TUNNEL RELEASE Right 11/28/2016    Performed by Grant Tovar MD at [facility]   • CATARACT      bilateral, Dr Giovanni Pak Used    Substance and Sexual Activity      Alcohol use:  Yes        Alcohol/week: 0.0 oz        Comment: wine most days      Drug use: No      Sexual activity: Not on file    Other Topics      Concerns:         Service: Not Asked        Blood Transf Trochanteric Bursa     Vascular lower extremity:   Dorsalis pedis pulse-RIGHT 2+   Dorsalis pedis pulse-LEFT 2+   Tibialis posterior pulse-RIGHT 2+   Tibialis posterior pulse-LEFT 2+     Neurological Lower Extremity:    Light Touch Sensation: Intact in danny

## 2018-11-28 NOTE — TELEPHONE ENCOUNTER
Patient has been scheduled for a bilateral L5 TFESIs  on 12/14/18 at the [facility]. Medications and allergies reviewed. Patient informed to hold aspirins, nsaids, blood thinners, multivitamins, vitamin E and fish oils 3-7 days prior to procedure.  Patient inform

## 2018-11-28 NOTE — TELEPHONE ENCOUNTER
Medicare Online for insurance coverage of bilateral L5 TFESIs cpt codes [id number], [id number]. Insurance was verified and procedure is a covered benefit and does not require authorization.   Will inform Nursing

## 2018-11-29 ENCOUNTER — MED REC SCAN ONLY (OUTPATIENT)
Dept: NEUROLOGY | Facility: CLINIC | Age: 78
End: 2018-11-29

## 2018-12-14 ENCOUNTER — OFFICE VISIT (OUTPATIENT)
Dept: SURGERY | Facility: CLINIC | Age: 78
End: 2018-12-14
Payer: MEDICARE

## 2018-12-14 DIAGNOSIS — M54.17 LUMBOSACRAL RADICULOPATHY AT L5: Primary | ICD-10-CM

## 2018-12-14 DIAGNOSIS — M48.062 SPINAL STENOSIS OF LUMBAR REGION WITH NEUROGENIC CLAUDICATION: ICD-10-CM

## 2018-12-14 DIAGNOSIS — M51.26 LUMBAR HERNIATED DISC: ICD-10-CM

## 2018-12-14 PROCEDURE — 64483 NJX AA&/STRD TFRM EPI L/S 1: CPT | Performed by: PHYSICAL MEDICINE & REHABILITATION

## 2018-12-14 NOTE — PROCEDURES
[address].    BILATERAL LUMBAR TRANSFORAMINAL   NAME:  Darrel Crandall    MR #:    [id number] :  1940     PHYSICIAN:  Adriano Wheatley        Operative Report    DATE OF PROCEDURE: 2018   PREOPERATIVE DIAGNOSES: 1. left anterior obliqued opening up the left L5-S1 intervertebral foramen. At this point in time, the patient's skin was anesthetized with 1 to 2 cc of 1% PF lidocaine without epinephrine.   Then, a 3.5 inch, 22-gauge spinal needle was inserted and directed towar

## 2019-01-02 ENCOUNTER — TELEPHONE (OUTPATIENT)
Dept: NEUROLOGY | Facility: CLINIC | Age: 79
End: 2019-01-02

## 2019-01-02 NOTE — TELEPHONE ENCOUNTER
Pt called stated that she is going to [place] for 4 months and took rx for Alexia Fernandezd 300 tabs to [facility] to fill and they would not fill rx. Pt was given #90 tabs. Pt was told that the laws have changed as far as opoid rx.   She must have it filled in state it

## 2019-01-22 RX ORDER — HYDROCODONE BITARTRATE AND ACETAMINOPHEN 5; 325 MG/1; MG/1
1 TABLET ORAL EVERY 8 HOURS PRN
Qty: 90 TABLET | Refills: 0 | Status: SHIPPED | OUTPATIENT
Start: 2019-01-28 | End: 2019-02-27

## 2019-01-22 NOTE — TELEPHONE ENCOUNTER
Norco 5/325mg. Take 1 tablet every 8 hours prn pain. #90. No refills.      [place]    LOV-11/27/2018 NOV-5/6/2019

## 2019-02-27 NOTE — TELEPHONE ENCOUNTER
Medication request: HYDROcodone-acetaminophen 5-325 MG Oral Tab, #90, no refill  Take 1 tablet by mouth every 8 (eight) hours as needed for pain.     [place]: 2/1/19    LOV: 11/27/18  NOV: 5/6/19    Medication pended - routed to Dr. Sri Gutierrez

## 2019-02-28 RX ORDER — HYDROCODONE BITARTRATE AND ACETAMINOPHEN 5; 325 MG/1; MG/1
1 TABLET ORAL EVERY 8 HOURS PRN
Qty: 90 TABLET | Refills: 0 | Status: SHIPPED | OUTPATIENT
Start: 2019-03-03 | End: 2019-04-09

## 2019-03-27 ENCOUNTER — TELEPHONE (OUTPATIENT)
Dept: NEUROLOGY | Facility: CLINIC | Age: 79
End: 2019-03-27

## 2019-04-09 NOTE — TELEPHONE ENCOUNTER
Medication request: HYDROcodone-acetaminophen 5-325 MG Oral Tab, #90, no refill  Take 1 tablet by mouth every 8 (eight) hours as needed for pain.      [place]: 3/8/19    LOV: 11/27/18, 12/14/18 at [facility]: 4/29/19

## 2019-04-11 RX ORDER — HYDROCODONE BITARTRATE AND ACETAMINOPHEN 5; 325 MG/1; MG/1
1 TABLET ORAL EVERY 8 HOURS PRN
Qty: 90 TABLET | Refills: 0 | Status: SHIPPED | OUTPATIENT
Start: 2019-04-11 | End: 2019-04-29

## 2019-04-29 ENCOUNTER — OFFICE VISIT (OUTPATIENT)
Dept: NEUROLOGY | Facility: CLINIC | Age: 79
End: 2019-04-29
Payer: MEDICARE

## 2019-04-29 ENCOUNTER — HOSPITAL ENCOUNTER (OUTPATIENT)
Dept: GENERAL RADIOLOGY | Facility: HOSPITAL | Age: 79
Discharge: HOME | End: 2019-04-29
Attending: PHYSICAL MEDICINE & REHABILITATION
Payer: MEDICARE

## 2019-04-29 ENCOUNTER — TELEPHONE (OUTPATIENT)
Dept: NEUROLOGY | Facility: CLINIC | Age: 79
End: 2019-04-29

## 2019-04-29 VITALS — SYSTOLIC BLOOD PRESSURE: 120 MMHG | DIASTOLIC BLOOD PRESSURE: 78 MMHG | RESPIRATION RATE: 20 BRPM | HEART RATE: 84 BPM

## 2019-04-29 DIAGNOSIS — M43.16 SPONDYLOLISTHESIS OF LUMBAR REGION: ICD-10-CM

## 2019-04-29 DIAGNOSIS — M48.062 SPINAL STENOSIS OF LUMBAR REGION WITH NEUROGENIC CLAUDICATION: ICD-10-CM

## 2019-04-29 DIAGNOSIS — M54.6 ACUTE LEFT-SIDED THORACIC BACK PAIN: ICD-10-CM

## 2019-04-29 DIAGNOSIS — M51.26 BULGING LUMBAR DISC: ICD-10-CM

## 2019-04-29 DIAGNOSIS — M48.061 LUMBAR FORAMINAL STENOSIS: ICD-10-CM

## 2019-04-29 DIAGNOSIS — M43.10 RETROLISTHESIS OF VERTEBRAE: ICD-10-CM

## 2019-04-29 DIAGNOSIS — M54.17 LUMBOSACRAL RADICULOPATHY AT L5: Primary | ICD-10-CM

## 2019-04-29 DIAGNOSIS — S12.031S: ICD-10-CM

## 2019-04-29 DIAGNOSIS — M51.26 LUMBAR HERNIATED DISC: ICD-10-CM

## 2019-04-29 DIAGNOSIS — M54.17 LUMBOSACRAL RADICULOPATHY AT L5: ICD-10-CM

## 2019-04-29 PROCEDURE — 72100 X-RAY EXAM L-S SPINE 2/3 VWS: CPT | Performed by: PHYSICAL MEDICINE & REHABILITATION

## 2019-04-29 PROCEDURE — 99214 OFFICE O/P EST MOD 30 MIN: CPT | Performed by: PHYSICAL MEDICINE & REHABILITATION

## 2019-04-29 PROCEDURE — 72072 X-RAY EXAM THORAC SPINE 3VWS: CPT | Performed by: PHYSICAL MEDICINE & REHABILITATION

## 2019-04-29 RX ORDER — HYDROCODONE BITARTRATE AND ACETAMINOPHEN 5; 325 MG/1; MG/1
1 TABLET ORAL EVERY 8 HOURS PRN
Qty: 90 TABLET | Refills: 0 | Status: SHIPPED | OUTPATIENT
Start: 2019-05-06 | End: 2019-06-04

## 2019-04-29 NOTE — PROGRESS NOTES
Patient has been scheduled for a left L4 and L5 TFESIs  on 5/3/19 at the [facility]. Medications and allergies reviewed. Patient informed to hold aspirins, nsaids, blood thinners, multivitamins, vitamin E and fish oils 3-7 days prior to procedure.  Patient inform

## 2019-04-29 NOTE — PATIENT INSTRUCTIONS
As of October 6th 2014, the Drug Enforcement Agency [facility]) is reclassifying all hydrocodone combination medications from Schedule III to Schedule II. This includes medications such as Norco, Vicodin, Lortab, Zohydro, and Vicoprofen.     What this means for y will get thoracic and lumbar spine x-rays to look for a new compression fracture. She will call me after she has had these done and if there is no fracture, then I will do left L4 and L5 TFESI's.      If there is a compression fracture, I spoke to her

## 2019-04-29 NOTE — PROGRESS NOTES
Low Back Pain H & P    Chief Complaint: Patient presents with:  Low Back Pain: pt here for follow up after Bilateral L5 transforaminal epidural steroid injections 12/14/19 with 80% for 2 months and then pain returned to baseline.  c/o lower back radiating u 11/28/2016    Performed by Greg Lynn MD at [facility]   • [facility]   • CATARACT EXTRACTION Right 11/24/14   • D & C      x 2   • FOREARM/WRIST SURGERY UNLISTED Right     remove plate from wris Not on file    Lifestyle      Physical activity:        Days per week: Not on file        Minutes per session: Not on file      Stress: Not on file    Relationships      Social connections:        Talks on phone: Not on file        Gets together: Not on fi Resp: 20       Gait:    Gait: Normal gait   Sit to Stand: mild difficulty      Lumbar Spine:    Scoliosis: Thoracic levoscoliosis that is mild and Lumbar dextroscoliosis that is mild     Lumbar Spine Palpation:    Spinous Processes: Non-tender for all Sp conclusive, then she will qiana to have a new MRI of the thoracic and lumbar spines. She will continue with the Norco.  She was taking 4 tablets in one day on some days due to the increased pain. She will follow up in 3 months or sooner if needed.

## 2019-05-01 NOTE — TELEPHONE ENCOUNTER
Patients  Carlos Patiño informed of Dr. Up Home interpretation of the results and informed patient is all set for Friday 5/3/19 for left L4 and L5 TFESIs. Carlos Patiño verbalized understanding without further questions. -verified in fyi

## 2019-05-01 NOTE — TELEPHONE ENCOUNTER
I reviewed her x-rays and there is no new compression fracture. Ok to go ahead with left L4 and L5 TFESI's as scheduled.

## 2019-05-03 ENCOUNTER — OFFICE VISIT (OUTPATIENT)
Dept: SURGERY | Facility: CLINIC | Age: 79
End: 2019-05-03
Payer: MEDICARE

## 2019-05-03 DIAGNOSIS — M54.17 LUMBOSACRAL RADICULOPATHY AT L5: Primary | ICD-10-CM

## 2019-05-03 DIAGNOSIS — M51.26 LUMBAR HERNIATED DISC: ICD-10-CM

## 2019-05-03 DIAGNOSIS — M48.062 SPINAL STENOSIS OF LUMBAR REGION WITH NEUROGENIC CLAUDICATION: ICD-10-CM

## 2019-05-03 PROCEDURE — 64483 NJX AA&/STRD TFRM EPI L/S 1: CPT | Performed by: PHYSICAL MEDICINE & REHABILITATION

## 2019-05-03 PROCEDURE — 64484 NJX AA&/STRD TFRM EPI L/S EA: CPT | Performed by: PHYSICAL MEDICINE & REHABILITATION

## 2019-05-03 NOTE — PROCEDURES
[address] W[address]F [address].    2-LEVEL LUMBAR TRANSFORAMINAL   NAME:  Lela Munoz    MR #:    [id number] :  1940     PHYSICIAN:  Fly Wheatley        Operative Report    DATE OF PROCEDURE: 5/3/2019   PREOPERATIVE DIAGNOSES: 1. left > ri After this, the needles were removed. Each site was cleaned. Band-Aids were applied. The patient was transferred to the cart and into [facility]. The patient was given discharge instructions and will follow up in the clinic as scheduled.   Throughout the whole

## 2019-05-13 ENCOUNTER — TELEPHONE (OUTPATIENT)
Dept: NEUROLOGY | Facility: CLINIC | Age: 79
End: 2019-05-13

## 2019-05-13 NOTE — TELEPHONE ENCOUNTER
Patient calling with condition update post left L4 and L5 TFESIs done 5/3/19. Patient received 80% relief in symptoms and is doing well.  Reduced Norco 5-325mg from 4 tabs to 2-3 at most. Patient denies complaints at this time and will call back if symptoms

## 2019-05-21 ENCOUNTER — HOSPITAL ENCOUNTER (OUTPATIENT)
Dept: MAMMOGRAPHY | Facility: HOSPITAL | Age: 79
Discharge: HOME | End: 2019-05-21
Attending: OBSTETRICS & GYNECOLOGY
Payer: MEDICARE

## 2019-05-21 DIAGNOSIS — R92.8 OTH ABN AND INCONCLUSIVE FINDINGS ON DX IMAGING OF BREAST: ICD-10-CM

## 2019-05-21 PROCEDURE — 77065 DX MAMMO INCL CAD UNI: CPT | Performed by: OBSTETRICS & GYNECOLOGY

## 2019-05-21 PROCEDURE — 77061 BREAST TOMOSYNTHESIS UNI: CPT | Performed by: OBSTETRICS & GYNECOLOGY

## 2019-06-04 RX ORDER — HYDROCODONE BITARTRATE AND ACETAMINOPHEN 5; 325 MG/1; MG/1
1 TABLET ORAL EVERY 8 HOURS PRN
Qty: 90 TABLET | Refills: 0 | Status: SHIPPED | OUTPATIENT
Start: 2019-06-08 | End: 2019-07-08

## 2019-06-04 NOTE — TELEPHONE ENCOUNTER
Medication request: Norco 5-325mg q8h prn pain    LOV 4/29/19 had lumbar chana 5/3/19 cx 5/6/19  NOV 8/8/19    [place]: 5/9/19 #90    Per Dr. Hall Nurse at [facility]-  She will continue with the [facility] [facility].  She was taking 4 tablets in one day on some days due to

## 2019-07-08 RX ORDER — HYDROCODONE BITARTRATE AND ACETAMINOPHEN 5; 325 MG/1; MG/1
1 TABLET ORAL EVERY 8 HOURS PRN
Qty: 90 TABLET | Refills: 0 | Status: SHIPPED | OUTPATIENT
Start: 2019-07-10 | End: 2019-08-08

## 2019-07-08 NOTE — TELEPHONE ENCOUNTER
Pt called requesting a refill for Norco - pt states she is currently in [place] right now and is unable to  prescription on her own. Pt is sending her son, Kala Jackson to  her prescription.  Pt made aware that after this refill, she will

## 2019-07-09 NOTE — TELEPHONE ENCOUNTER
Prescription is ready for  at the [place] front office. Pt's son, Hortencia will  prescription. Pt informed she will need to pick her own scripts up from this point forward. Pt verbalized understanding. Son to be reminded of policy upon pickup.

## 2019-07-12 NOTE — TELEPHONE ENCOUNTER
Pt's son Sue Medrano picked up Rx, ID verified. Sue Medrano made aware that this would be the last time due to new policy that, Pt's need to  their own scripts.

## 2019-08-08 ENCOUNTER — OFFICE VISIT (OUTPATIENT)
Dept: NEUROLOGY | Facility: CLINIC | Age: 79
End: 2019-08-08
Payer: MEDICARE

## 2019-08-08 ENCOUNTER — TELEPHONE (OUTPATIENT)
Dept: NEUROLOGY | Facility: CLINIC | Age: 79
End: 2019-08-08

## 2019-08-08 VITALS — RESPIRATION RATE: 14 BRPM | SYSTOLIC BLOOD PRESSURE: 126 MMHG | HEART RATE: 70 BPM | DIASTOLIC BLOOD PRESSURE: 78 MMHG

## 2019-08-08 DIAGNOSIS — M43.10 RETROLISTHESIS OF VERTEBRAE: ICD-10-CM

## 2019-08-08 DIAGNOSIS — M48.062 SPINAL STENOSIS OF LUMBAR REGION WITH NEUROGENIC CLAUDICATION: ICD-10-CM

## 2019-08-08 DIAGNOSIS — M54.17 LUMBOSACRAL RADICULOPATHY AT L5: Primary | ICD-10-CM

## 2019-08-08 DIAGNOSIS — M48.061 LUMBAR FORAMINAL STENOSIS: ICD-10-CM

## 2019-08-08 DIAGNOSIS — G56.01 RIGHT CARPAL TUNNEL SYNDROME: ICD-10-CM

## 2019-08-08 DIAGNOSIS — M51.26 LUMBAR HERNIATED DISC: ICD-10-CM

## 2019-08-08 DIAGNOSIS — M51.26 BULGING LUMBAR DISC: ICD-10-CM

## 2019-08-08 DIAGNOSIS — M43.16 SPONDYLOLISTHESIS OF LUMBAR REGION: ICD-10-CM

## 2019-08-08 PROCEDURE — 99214 OFFICE O/P EST MOD 30 MIN: CPT | Performed by: PHYSICAL MEDICINE & REHABILITATION

## 2019-08-08 RX ORDER — HYDROCODONE BITARTRATE AND ACETAMINOPHEN 5; 325 MG/1; MG/1
1 TABLET ORAL EVERY 8 HOURS PRN
Qty: 90 TABLET | Refills: 0 | Status: SHIPPED | OUTPATIENT
Start: 2019-10-10 | End: 2019-10-22

## 2019-08-08 RX ORDER — HYDROCODONE BITARTRATE AND ACETAMINOPHEN 5; 325 MG/1; MG/1
1 TABLET ORAL EVERY 8 HOURS PRN
Qty: 90 TABLET | Refills: 0 | Status: SHIPPED | OUTPATIENT
Start: 2019-09-10 | End: 2019-10-10

## 2019-08-08 RX ORDER — HYDROCODONE BITARTRATE AND ACETAMINOPHEN 5; 325 MG/1; MG/1
1 TABLET ORAL EVERY 8 HOURS PRN
Qty: 90 TABLET | Refills: 0 | Status: SHIPPED | OUTPATIENT
Start: 2019-08-11 | End: 2019-09-10

## 2019-08-08 NOTE — TELEPHONE ENCOUNTER
Medicare online for insurance coverage of  LEFT L4 (L4-5) L5 (L5-S1) LUMBAR TRANSFORAMINAL EPIDURAL INJECTIONS CPT CODES 78939,03014,35102 . Insurance was verified and procedure is a cover benefit and does not require authorization. Will inform Nursing.

## 2019-08-08 NOTE — PROGRESS NOTES
Patient has been scheduled for a left L4 and L5 TFESIs  on 8/9/19 at the [facility]. Medications and allergies reviewed. Patient has held aspirins, nsaids, blood thinners, multivitamins, vitamin E and fish oils 7 days prior to procedure.  Patient informed we will

## 2019-08-08 NOTE — PATIENT INSTRUCTIONS
Plan  I will perform left L4 and L5 TFESI(s). She will continue with the Norco for the pain.     The patient will resume her home exercise program.    If the injections do not help her that much, then she will need to have a new MRI of the lumbar spine

## 2019-08-08 NOTE — PROGRESS NOTES
Low Back Pain H & P    Chief Complaint: Patient presents with:  Low Back Pain: pt here for follow up after Left L4 and L5 transforaminal epidural steroid injections 5/3/19 with significant relief for 2 mos and Lumbar/Thoracic Xrays done 4/29/19.  pt c/o con HYPERLIPIDEMIA    • HYPERTENSION    • OSTEOPOROSIS     lumbar spine   • OTHER DISEASES     lumbar spinal stenosis   • OTHER DISEASES     L4/L5 spondlyolisthesis   • OTHER DISEASES     thoracic DJD   • OTHER DISEASES     fibrocystic breast disease   • OTHER Occupational History      Not on file    Social Needs      Financial resource strain: Not on file      Food insecurity:        Worry: Not on file        Inability: Not on file      Transportation needs:        Medical: Not on file        Non-medical: Not o does appear in her stated age in no distress. The patient is well groomed. Psychiatric:  The patient is alert and oriented x 3. The patient has a normal affect and mood. Respiratory:  No acute respiratory distress. Patient does not have a cough. L4-5 grade 2 spondylolisthesis     7. L2-3 grade 1 retrolisthesis    8. Right carpal tunnel syndrome         Plan  I will perform left L4 and L5 TFESI(s). She will continue with the Norco for the pain.     The patient will resume her home exercise progra

## 2019-08-09 ENCOUNTER — MED REC SCAN ONLY (OUTPATIENT)
Dept: NEUROLOGY | Facility: CLINIC | Age: 79
End: 2019-08-09

## 2019-08-09 ENCOUNTER — OFFICE VISIT (OUTPATIENT)
Dept: SURGERY | Facility: CLINIC | Age: 79
End: 2019-08-09
Payer: MEDICARE

## 2019-08-09 DIAGNOSIS — M54.17 LUMBOSACRAL RADICULOPATHY AT L5: Primary | ICD-10-CM

## 2019-08-09 DIAGNOSIS — M48.062 SPINAL STENOSIS OF LUMBAR REGION WITH NEUROGENIC CLAUDICATION: ICD-10-CM

## 2019-08-09 DIAGNOSIS — M51.26 LUMBAR HERNIATED DISC: ICD-10-CM

## 2019-08-09 PROCEDURE — 64484 NJX AA&/STRD TFRM EPI L/S EA: CPT | Performed by: PHYSICAL MEDICINE & REHABILITATION

## 2019-08-09 PROCEDURE — 64483 NJX AA&/STRD TFRM EPI L/S 1: CPT | Performed by: PHYSICAL MEDICINE & REHABILITATION

## 2019-08-09 NOTE — PROCEDURES
[place] [facility].    2-LEVEL LUMBAR TRANSFORAMINAL   NAME:  Todd Xavier    MR #:    [id number] :  1940     PHYSICIAN:  Collette Footman A. Couri        Operative Report    DATE OF PROCEDURE: 2019   PREOPERATIVE DIAGNOSES: 1. left L4 & this, the needles were removed. Each site was cleaned. Band-Aids were applied. The patient was transferred to the cart and into [facility]. The patient was given discharge instructions and will follow up in the clinic as scheduled.   Throughout the whole proce

## 2019-10-09 ENCOUNTER — TELEPHONE (OUTPATIENT)
Dept: NEUROLOGY | Facility: CLINIC | Age: 79
End: 2019-10-09

## 2019-10-09 ENCOUNTER — OFFICE VISIT (OUTPATIENT)
Dept: NEUROLOGY | Facility: CLINIC | Age: 79
End: 2019-10-09
Payer: MEDICARE

## 2019-10-09 VITALS — HEART RATE: 76 BPM | SYSTOLIC BLOOD PRESSURE: 136 MMHG | RESPIRATION RATE: 14 BRPM | DIASTOLIC BLOOD PRESSURE: 74 MMHG

## 2019-10-09 DIAGNOSIS — M51.26 BULGING LUMBAR DISC: ICD-10-CM

## 2019-10-09 DIAGNOSIS — M43.10 RETROLISTHESIS OF VERTEBRAE: ICD-10-CM

## 2019-10-09 DIAGNOSIS — M43.16 SPONDYLOLISTHESIS OF LUMBAR REGION: ICD-10-CM

## 2019-10-09 DIAGNOSIS — M48.061 LUMBAR FORAMINAL STENOSIS: ICD-10-CM

## 2019-10-09 DIAGNOSIS — M48.062 SPINAL STENOSIS OF LUMBAR REGION WITH NEUROGENIC CLAUDICATION: ICD-10-CM

## 2019-10-09 DIAGNOSIS — M54.17 LUMBOSACRAL RADICULOPATHY AT L5: Primary | ICD-10-CM

## 2019-10-09 DIAGNOSIS — M51.26 LUMBAR HERNIATED DISC: ICD-10-CM

## 2019-10-09 PROCEDURE — 99214 OFFICE O/P EST MOD 30 MIN: CPT | Performed by: PHYSICAL MEDICINE & REHABILITATION

## 2019-10-09 RX ORDER — PREDNISONE 10 MG/1
10 TABLET ORAL DAILY
Qty: 28 EACH | Refills: 0 | Status: SHIPPED | OUTPATIENT
Start: 2019-10-09 | End: 2019-10-25 | Stop reason: ALTCHOICE

## 2019-10-09 RX ORDER — LEVOCETIRIZINE DIHYDROCHLORIDE 5 MG/1
5 TABLET, FILM COATED ORAL EVERY EVENING
COMMUNITY

## 2019-10-09 RX ORDER — DIPHENHYDRAMINE HCL 25 MG
25 TABLET ORAL EVERY 6 HOURS PRN
COMMUNITY
End: 2020-01-12 | Stop reason: ALTCHOICE

## 2019-10-09 NOTE — TELEPHONE ENCOUNTER
Medicare online for insurance coverage of  MRI SPINE LUMBAR CPT CODE 92574 . Insurance was verified and procedure is a cover benefit and does not require authorization. Will inform patient.  Patient informed of approval. Can proceed and schedule appointment

## 2019-10-09 NOTE — PATIENT INSTRUCTIONS
.As of October 6th 2014, the Drug Enforcement Agency [facility]) is reclassifying all hydrocodone combination medications from Schedule III to Schedule II. This includes medications such as Norco, Vicodin, Lortab, Zohydro, and Vicoprofen.     What this means for told her to take 2 of the Norco 5/325 up to 4 times a day for the pain. She will take a prednisone taper. She will call me next week to let me know how she is doing after taking the steroids. She will get a new MRI of the lumabr spine.     She will

## 2019-10-09 NOTE — PROGRESS NOTES
Low Back Pain H & P    Chief Complaint: Patient presents with:  Low Back Pain: pt here for follow up after Left L4 and L5 transforaminal epidural steroid injections 8/9/19 with good relief for 2 weeks then pain became severe in the lower back and right hip both will joseph on her.      Past Medical History   Past Medical History:   Diagnosis Date   • ALLERGIC RHINITIS    • Arthritis    • Breast lump    • Cardiomegaly    • Constipation    • HYPERLIPIDEMIA    • HYPERTENSION    • OSTEOPOROSIS     lumbar spine History      Marital status:       Spouse name: Not on file      Number of children: Not on file      Years of education: Not on file      Highest education level: Not on file    Occupational History      Not on file    Social Needs      Financial r time. Splint to r wrist. Soft collar neck. Has 12 stairs in home which she must use. Some days are more difficult than others. Review of Systems      PE: The patient does appear in her stated age in moderate-severe distress.   The patient prednisone taper. She will call me next week to let me know how she is doing after taking the steroids. She will get a new MRI of the lumabr spine.     She will call me after she has had the MRI scan and I will review it and let her know what the next

## 2019-10-14 ENCOUNTER — TELEPHONE (OUTPATIENT)
Dept: NEUROLOGY | Facility: CLINIC | Age: 79
End: 2019-10-14

## 2019-10-17 ENCOUNTER — HOSPITAL ENCOUNTER (OUTPATIENT)
Dept: MRI IMAGING | Facility: HOSPITAL | Age: 79
Discharge: HOME | End: 2019-10-17
Attending: PHYSICAL MEDICINE & REHABILITATION
Payer: MEDICARE

## 2019-10-17 DIAGNOSIS — M54.17 LUMBOSACRAL RADICULOPATHY AT L5: ICD-10-CM

## 2019-10-17 PROCEDURE — 72148 MRI LUMBAR SPINE W/O DYE: CPT | Performed by: PHYSICAL MEDICINE & REHABILITATION

## 2019-10-19 ENCOUNTER — MOBILE ENCOUNTER (OUTPATIENT)
Dept: NEUROLOGY | Facility: CLINIC | Age: 79
End: 2019-10-19

## 2019-10-19 RX ORDER — CYCLOBENZAPRINE HCL 10 MG
10 TABLET ORAL 3 TIMES DAILY PRN
Qty: 90 TABLET | Refills: 0 | Status: ON HOLD | OUTPATIENT
Start: 2019-10-19 | End: 2019-10-31

## 2019-10-19 NOTE — PROGRESS NOTES
Paged at 115pm - worsening severe low back pain radiating down both legs.  states patient is “bedridden” but does not want to go to ER. Asking if there are any recommendations.  No relief from prednisone taper and increasing norco. Had MRI lumbar spi

## 2019-10-21 ENCOUNTER — TELEPHONE (OUTPATIENT)
Dept: NEUROLOGY | Facility: CLINIC | Age: 79
End: 2019-10-21

## 2019-10-21 NOTE — TELEPHONE ENCOUNTER
Patients  Rubi Ferrer informed of compression fracture per Dr. Daksha Felipe after reviewing Lumbar Spine MRI done 10/17/19.   Rubi Ferrer verbalized understanding and request new prescription for [place] be given at appointment tomorrow since Dr. Daksha Felipe notified her she can

## 2019-10-22 ENCOUNTER — OFFICE VISIT (OUTPATIENT)
Dept: NEUROLOGY | Facility: CLINIC | Age: 79
End: 2019-10-22
Payer: MEDICARE

## 2019-10-22 VITALS — SYSTOLIC BLOOD PRESSURE: 135 MMHG | DIASTOLIC BLOOD PRESSURE: 85 MMHG | HEART RATE: 74 BPM

## 2019-10-22 DIAGNOSIS — M48.061 LUMBAR FORAMINAL STENOSIS: ICD-10-CM

## 2019-10-22 DIAGNOSIS — M51.26 LUMBAR HERNIATED DISC: ICD-10-CM

## 2019-10-22 DIAGNOSIS — M43.16 SPONDYLOLISTHESIS OF LUMBAR REGION: ICD-10-CM

## 2019-10-22 DIAGNOSIS — S32.050G COMPRESSION FRACTURE OF L5 VERTEBRA WITH DELAYED HEALING: Primary | ICD-10-CM

## 2019-10-22 DIAGNOSIS — M51.26 BULGING LUMBAR DISC: ICD-10-CM

## 2019-10-22 DIAGNOSIS — M43.10 RETROLISTHESIS OF VERTEBRAE: ICD-10-CM

## 2019-10-22 DIAGNOSIS — M54.17 LUMBOSACRAL RADICULOPATHY AT L5: ICD-10-CM

## 2019-10-22 DIAGNOSIS — M48.062 SPINAL STENOSIS OF LUMBAR REGION WITH NEUROGENIC CLAUDICATION: ICD-10-CM

## 2019-10-22 PROCEDURE — 99214 OFFICE O/P EST MOD 30 MIN: CPT | Performed by: PHYSICAL MEDICINE & REHABILITATION

## 2019-10-22 RX ORDER — HYDROCODONE BITARTRATE AND ACETAMINOPHEN 10; 325 MG/1; MG/1
1 TABLET ORAL EVERY 6 HOURS PRN
Qty: 60 TABLET | Refills: 0 | Status: ON HOLD | OUTPATIENT
Start: 2019-10-22 | End: 2019-10-31

## 2019-10-22 NOTE — PROGRESS NOTES
Low Back Pain H & P    Chief Complaint: Patient presents with:  Low Back Pain: LOV: 10/9/19 - Pt presents for MRI f/u - moderate compression fracture at L5. Pt has finished Prednisone pack w/o relief, currently taking Flexeril 10mg TID w/o relief.  Rates av Performed by Korina Tom MD at [address] [facility]   • CATARACT      bilateral, Dr Haseeb Jerome, [address] [facility]   • CATARACT EXTRACTION Right 11/24/14   • D & C      x 2   • FOREARM/WRIST SURGERY UNLISTED Right     remove plate from wrist    • HIP REP on file    Lifestyle      Physical activity:        Days per week: Not on file        Minutes per session: Not on file      Stress: Not on file    Relationships      Social connections:        Talks on phone: Not on file        Gets together: Not on file lower extremity:   Dorsalis pedis pulse-RIGHT 2+   Dorsalis pedis pulse-LEFT 2+     Neurological Lower Extremity:    Light Touch Sensation: Intact in bilateral Lower Extremities   LE Muscle Strength:  All LE strength measurements 5/5 except:  Hamstring LEFT

## 2019-10-22 NOTE — PATIENT INSTRUCTIONS
Plan  She will see interventional radiology about having kyphoplasty at the L5 level.     I discussed with her that fact that she has osteoporosis and may not be able to having the injections as frequently or at all since she has developed a new compressi

## 2019-10-23 DIAGNOSIS — S32.050A: Primary | ICD-10-CM

## 2019-10-23 NOTE — PLAN OF CARE
· You are scheduled to have Kyphoplasty of L5. · Do NOT eat or drink anything after 10:00AM  · Medications you are allowed to take can be taken with a sip of water. · Stop Fish Oil today 10/23/19  Use Betasept/ Hibiclens soap for 3 consecutive days.

## 2019-10-25 ENCOUNTER — MED REC SCAN ONLY (OUTPATIENT)
Dept: NEUROLOGY | Facility: CLINIC | Age: 79
End: 2019-10-25

## 2019-10-25 VITALS — BODY MASS INDEX: 24 KG/M2 | WEIGHT: 125 LBS

## 2019-10-25 RX ORDER — SODIUM CHLORIDE 9 MG/ML
INJECTION, SOLUTION INTRAVENOUS CONTINUOUS
Status: CANCELLED | OUTPATIENT
Start: 2019-10-25

## 2019-10-28 ENCOUNTER — HOSPITAL ENCOUNTER (OUTPATIENT)
Facility: HOSPITAL | Age: 79
Setting detail: OBSERVATION
Discharge: HOME | End: 2019-10-31
Attending: EMERGENCY MEDICINE | Admitting: HOSPITALIST
Payer: MEDICARE

## 2019-10-28 ENCOUNTER — HOSPITAL ENCOUNTER (OUTPATIENT)
Dept: INTERVENTIONAL RADIOLOGY/VASCULAR | Facility: HOSPITAL | Age: 79
Discharge: HOME | End: 2019-10-28
Attending: RADIOLOGY
Payer: MEDICARE

## 2019-10-28 DIAGNOSIS — S32.050A COMPRESSION FRACTURE OF L5 VERTEBRA, INITIAL ENCOUNTER (HCC): Primary | ICD-10-CM

## 2019-10-28 DIAGNOSIS — M48.07 SPINAL STENOSIS OF LUMBOSACRAL REGION: ICD-10-CM

## 2019-10-28 PROCEDURE — 99214 OFFICE O/P EST MOD 30 MIN: CPT | Performed by: PHYSICAL MEDICINE & REHABILITATION

## 2019-10-28 RX ORDER — MORPHINE SULFATE 4 MG/ML
4 INJECTION, SOLUTION INTRAMUSCULAR; INTRAVENOUS ONCE
Status: COMPLETED | OUTPATIENT
Start: 2019-10-28 | End: 2019-10-28

## 2019-10-28 RX ORDER — CETIRIZINE HYDROCHLORIDE 10 MG/1
10 TABLET ORAL DAILY
Status: DISCONTINUED | OUTPATIENT
Start: 2019-10-29 | End: 2019-10-28

## 2019-10-28 RX ORDER — ACETAMINOPHEN 500 MG
1000 TABLET ORAL EVERY 8 HOURS
Status: DISCONTINUED | OUTPATIENT
Start: 2019-10-28 | End: 2019-10-28

## 2019-10-28 RX ORDER — MORPHINE SULFATE 2 MG/ML
1 INJECTION, SOLUTION INTRAMUSCULAR; INTRAVENOUS EVERY 2 HOUR PRN
Status: DISCONTINUED | OUTPATIENT
Start: 2019-10-28 | End: 2019-10-30

## 2019-10-28 RX ORDER — DESVENLAFAXINE 50 MG/1
100 TABLET, EXTENDED RELEASE ORAL DAILY
Status: DISCONTINUED | OUTPATIENT
Start: 2019-10-29 | End: 2019-10-28

## 2019-10-28 RX ORDER — SODIUM CHLORIDE 0.9 % (FLUSH) 0.9 %
3 SYRINGE (ML) INJECTION AS NEEDED
Status: DISCONTINUED | OUTPATIENT
Start: 2019-10-28 | End: 2019-10-31

## 2019-10-28 RX ORDER — METOPROLOL SUCCINATE 100 MG/1
100 TABLET, EXTENDED RELEASE ORAL DAILY
Status: DISCONTINUED | OUTPATIENT
Start: 2019-10-28 | End: 2019-10-31

## 2019-10-28 RX ORDER — CYCLOBENZAPRINE HCL 10 MG
10 TABLET ORAL 3 TIMES DAILY PRN
Status: DISCONTINUED | OUTPATIENT
Start: 2019-10-28 | End: 2019-10-28

## 2019-10-28 RX ORDER — ACETAMINOPHEN 325 MG/1
650 TABLET ORAL EVERY 6 HOURS PRN
Status: DISCONTINUED | OUTPATIENT
Start: 2019-10-28 | End: 2019-10-31

## 2019-10-28 RX ORDER — MORPHINE SULFATE 4 MG/ML
2 INJECTION, SOLUTION INTRAMUSCULAR; INTRAVENOUS ONCE
Status: COMPLETED | OUTPATIENT
Start: 2019-10-28 | End: 2019-10-28

## 2019-10-28 RX ORDER — HYDROCODONE BITARTRATE AND ACETAMINOPHEN 10; 325 MG/1; MG/1
1 TABLET ORAL EVERY 4 HOURS PRN
Status: DISCONTINUED | OUTPATIENT
Start: 2019-10-28 | End: 2019-10-31

## 2019-10-28 RX ORDER — GABAPENTIN 100 MG/1
100 CAPSULE ORAL 3 TIMES DAILY
Status: DISCONTINUED | OUTPATIENT
Start: 2019-10-28 | End: 2019-10-31

## 2019-10-28 RX ORDER — HYDRALAZINE HYDROCHLORIDE 25 MG/1
25 TABLET, FILM COATED ORAL EVERY 8 HOURS PRN
Status: DISCONTINUED | OUTPATIENT
Start: 2019-10-28 | End: 2019-10-31

## 2019-10-28 RX ORDER — CETIRIZINE HYDROCHLORIDE 10 MG/1
10 TABLET ORAL DAILY
Status: DISCONTINUED | OUTPATIENT
Start: 2019-10-28 | End: 2019-10-31

## 2019-10-28 RX ORDER — ATORVASTATIN CALCIUM 20 MG/1
20 TABLET, FILM COATED ORAL NIGHTLY
Status: DISCONTINUED | OUTPATIENT
Start: 2019-10-28 | End: 2019-10-31

## 2019-10-28 RX ORDER — ACETAMINOPHEN 325 MG/1
650 TABLET ORAL EVERY 6 HOURS PRN
Status: DISCONTINUED | OUTPATIENT
Start: 2019-10-28 | End: 2019-10-28

## 2019-10-28 RX ORDER — MORPHINE SULFATE 4 MG/ML
INJECTION, SOLUTION INTRAMUSCULAR; INTRAVENOUS
Status: DISPENSED
Start: 2019-10-28 | End: 2019-10-29

## 2019-10-28 RX ORDER — CYCLOBENZAPRINE HCL 10 MG
10 TABLET ORAL 3 TIMES DAILY
Status: DISCONTINUED | OUTPATIENT
Start: 2019-10-28 | End: 2019-10-28

## 2019-10-28 RX ORDER — DESVENLAFAXINE 50 MG/1
100 TABLET, EXTENDED RELEASE ORAL DAILY
Status: DISCONTINUED | OUTPATIENT
Start: 2019-10-28 | End: 2019-10-31

## 2019-10-28 RX ORDER — METOPROLOL SUCCINATE 100 MG/1
100 TABLET, EXTENDED RELEASE ORAL
Status: DISCONTINUED | OUTPATIENT
Start: 2019-10-29 | End: 2019-10-28

## 2019-10-28 RX ORDER — SODIUM CHLORIDE 9 MG/ML
1000 INJECTION, SOLUTION INTRAVENOUS ONCE
Status: COMPLETED | OUTPATIENT
Start: 2019-10-28 | End: 2019-10-28

## 2019-10-28 RX ORDER — ALPRAZOLAM 0.5 MG/1
0.25 TABLET ORAL NIGHTLY PRN
Status: DISCONTINUED | OUTPATIENT
Start: 2019-10-28 | End: 2019-10-29

## 2019-10-28 RX ORDER — TRAMADOL HYDROCHLORIDE 50 MG/1
50 TABLET ORAL EVERY 6 HOURS PRN
Status: DISCONTINUED | OUTPATIENT
Start: 2019-10-28 | End: 2019-10-31

## 2019-10-28 RX ORDER — MORPHINE SULFATE 2 MG/ML
2 INJECTION, SOLUTION INTRAMUSCULAR; INTRAVENOUS EVERY 2 HOUR PRN
Status: DISCONTINUED | OUTPATIENT
Start: 2019-10-28 | End: 2019-10-30

## 2019-10-28 RX ORDER — CETIRIZINE HYDROCHLORIDE 10 MG/1
5 TABLET ORAL NIGHTLY
Status: DISCONTINUED | OUTPATIENT
Start: 2019-10-29 | End: 2019-10-28

## 2019-10-28 RX ORDER — DIAZEPAM 5 MG/1
5 TABLET ORAL EVERY 6 HOURS PRN
Status: DISCONTINUED | OUTPATIENT
Start: 2019-10-28 | End: 2019-10-31

## 2019-10-28 RX ORDER — MORPHINE SULFATE 4 MG/ML
4 INJECTION, SOLUTION INTRAMUSCULAR; INTRAVENOUS EVERY 2 HOUR PRN
Status: DISCONTINUED | OUTPATIENT
Start: 2019-10-28 | End: 2019-10-30

## 2019-10-28 RX ORDER — FLUTICASONE PROPIONATE 50 MCG
2 SPRAY, SUSPENSION (ML) NASAL DAILY
Status: DISCONTINUED | OUTPATIENT
Start: 2019-10-29 | End: 2019-10-31

## 2019-10-28 RX ORDER — FLUTICASONE PROPIONATE 50 MCG
1 SPRAY, SUSPENSION (ML) NASAL DAILY
Status: DISCONTINUED | OUTPATIENT
Start: 2019-10-28 | End: 2019-10-28

## 2019-10-28 RX ORDER — CYCLOBENZAPRINE HCL 10 MG
10 TABLET ORAL ONCE
Status: COMPLETED | OUTPATIENT
Start: 2019-10-28 | End: 2019-10-28

## 2019-10-28 NOTE — ED PROVIDER NOTES
Patient Seen in: [facility] AND [facility] Emergency Department      History   Patient presents with:  Back Pain    Stated Complaint: L5 compression fx, scheduled for OR today    HPI    Patient is a 40-year-old female who is scheduled to have kyphoplasty done in playing mini golf fell and broke left hip   • REMOVAL HARDWARE UPPER EXTREMITY Right 6/6/2016    Performed by Baltazar Riley [facility] at [facility]   • TOTAL HIP REPLACEMENT                      Social History    Tobacco Use      Smoking status: N PANEL (8) - Abnormal; Notable for the following components:       Result Value    Glucose 109 (*)     BUN/CREA Ratio 24.6 (*)     All other components within normal limits   CBC W/ DIFFERENTIAL - Abnormal; Notable for the following components:    . 3 provider specified. We recommend that you schedule follow up care with a primary care provider within the next three months to obtain basic health screening including reassessment of your blood pressure.     Medications Prescribed:  Current Discharge Medic

## 2019-10-28 NOTE — H&P
[facility] Hospitalist Team  History and Physical  Admit Date:  10/28/19    ASSESSMENT / PLAN:   79 yo female with htn, anxiety/depression, spinal stenosis, OP, seasonal allergies, rhinitis, HL and L5 compression fx who was scheduled for L5 compression fx today w She denies fall. It was low back radiating to right hip and then left hip. Over the last 3 weeks she is now having shooting pain down both her legs to her ankles. She states her left leg is worse then her right leg.  She states over the last 48 hours her pa Laterality Date   • CARPAL TUNNEL RELEASE Right 11/28/2016    Performed by Effie Herrera MD at [address]   • CATARACT      bilateral, Dr Feliz [place] [address] [facility]   • CATARACT EXTRACTION Right 11/24/14   • D & C      x 2   • FOREARM/WRIST S HPI.      DIAGNOSTIC DATA:   CBC/Chem  Recent Labs   Lab 10/28/19  1250   WBC 6.0   HGB 13.1   .3*   .0       Recent Labs   Lab 10/28/19  1258      K 3.7      CO2 29.0   BUN 15   CREATSERUM 0.61   *   CA 9.0       No result updated, he will see pt later today    HTN-uncontrolled  -in setting of pain   -continue toprol with holding parameters  -prn hydralazine  -follow BP    Anxiety/Depression  -prn xanax at night, continue pristiq    OP  -resume calcium + vitamin d and ibandr

## 2019-10-28 NOTE — PROGRESS NOTES
[facility] Pharmacy Note:  Age Based Dose Adjustment    Chase Ar has been prescribed cetirizine (ZYRTEC) 10mg orally nightly.   Patient is >71 years old therefore the dose has been adjusted to 10 mg orally nightly    Thank you,  Beverley Fuentes, PharmD  10/28

## 2019-10-29 ENCOUNTER — APPOINTMENT (OUTPATIENT)
Dept: GENERAL RADIOLOGY | Facility: HOSPITAL | Age: 79
End: 2019-10-29
Attending: PHYSICAL MEDICINE & REHABILITATION
Payer: MEDICARE

## 2019-10-29 DIAGNOSIS — M54.50 LUMBAR PAIN: Primary | ICD-10-CM

## 2019-10-29 PROCEDURE — 3E0R3BZ INTRODUCTION OF ANESTHETIC AGENT INTO SPINAL CANAL, PERCUTANEOUS APPROACH: ICD-10-PCS | Performed by: PHYSICAL MEDICINE & REHABILITATION

## 2019-10-29 PROCEDURE — 3E0R33Z INTRODUCTION OF ANTI-INFLAMMATORY INTO SPINAL CANAL, PERCUTANEOUS APPROACH: ICD-10-PCS | Performed by: PHYSICAL MEDICINE & REHABILITATION

## 2019-10-29 PROCEDURE — 62323 NJX INTERLAMINAR LMBR/SAC: CPT | Performed by: PHYSICAL MEDICINE & REHABILITATION

## 2019-10-29 RX ORDER — POTASSIUM CHLORIDE 1.5 G/1.77G
40 POWDER, FOR SOLUTION ORAL EVERY 4 HOURS
Status: DISCONTINUED | OUTPATIENT
Start: 2019-10-29 | End: 2019-10-29

## 2019-10-29 RX ORDER — BETAMETHASONE SODIUM PHOSPHATE AND BETAMETHASONE ACETATE 3; 3 MG/ML; MG/ML
INJECTION, SUSPENSION INTRA-ARTICULAR; INTRALESIONAL; INTRAMUSCULAR; SOFT TISSUE AS NEEDED
Status: DISCONTINUED | OUTPATIENT
Start: 2019-10-29 | End: 2019-10-29 | Stop reason: HOSPADM

## 2019-10-29 RX ORDER — HYDRALAZINE HYDROCHLORIDE 20 MG/ML
10 INJECTION INTRAMUSCULAR; INTRAVENOUS ONCE
Status: COMPLETED | OUTPATIENT
Start: 2019-10-29 | End: 2019-10-29

## 2019-10-29 RX ORDER — LABETALOL HYDROCHLORIDE 5 MG/ML
10 INJECTION, SOLUTION INTRAVENOUS ONCE
Status: DISCONTINUED | OUTPATIENT
Start: 2019-10-29 | End: 2019-10-29

## 2019-10-29 RX ORDER — LIDOCAINE HYDROCHLORIDE 10 MG/ML
INJECTION, SOLUTION EPIDURAL; INFILTRATION; INTRACAUDAL; PERINEURAL AS NEEDED
Status: DISCONTINUED | OUTPATIENT
Start: 2019-10-29 | End: 2019-10-29 | Stop reason: HOSPADM

## 2019-10-29 NOTE — CONSULTS
Patient: Madonna Allen  Medical Record Number: [id number]  Referring Physician:  No ref.  provider found  PCP: Stephania Samaniego MD      HISTORY OF CHIEF COMPLAINT:    Madonna Allen is a 78year old female, who was admitted with progressive worsening lumbar spine   • OTHER DISEASES     lumbar spinal stenosis   • OTHER DISEASES     L4/L5 spondlyolisthesis   • OTHER DISEASES     thoracic DJD   • OTHER DISEASES     fibrocystic breast disease   • OTHER DISEASES     plantar fasciitis   • OTHER DISEASES Substance and Sexual Activity      Alcohol use:  Yes        Alcohol/week: 7.0 standard drinks        Types: 7 Glasses of wine per week        Frequency: 4 or more times a week        Drinks per session: 1 or 2        Binge frequency: Never      Drug use: Babinski Neg Neg   Clonus Neg Neg     EYES: EOMI, ALIS  SKIN EXAM: Skin is intact, head, neck, trunk and arms/legs. No rashes, mottling or ulcerations. LYMPH EXAM: There is no edema in bilateral lower extremities.   VASCULAR EXAM:  pulses are normal bilat

## 2019-10-29 NOTE — PROGRESS NOTES
Loratidine 10mg tablet is Non-Formulary Medication &  Auto-Substituted to Cetirizine 10mg daily  Per P&T PROTOCOL

## 2019-10-29 NOTE — OCCUPATIONAL THERAPY NOTE
OT orders received and chart reviewed. Medical course undetermined for LESI vs kyphoplasty.  Will await final decision and initiate therapy as appropriate

## 2019-10-29 NOTE — CONSULTS
[address] Patient Status:  Observation    1940 MRN [id number]   Location [facility] 5SW/SE Attending Kostas Vásquez MD   Hosp Day # 0 PCP Kayla Soares MD     Patient Identification  Onaagueda Hager is a 7 spasms. , Disp: 90 tablet, Rfl: 0  Levocetirizine Dihydrochloride 5 MG Oral Tab, Take 5 mg by mouth every evening., Disp: , Rfl:   diphenhydrAMINE HCl 25 MG Oral Tab, Take 25 mg by mouth every 6 (six) hours as needed for Allergies. , Disp: , Rfl:   Abdiel Lennon RHINITIS    • Anxiety state    • Breast lump    • Cardiomegaly    • Constipation    • Depression    • High blood pressure    • High cholesterol    • IBS (irritable bowel syndrome)    • Osteoarthritis    • OSTEOPOROSIS     lumbar spine   • OTHER DISEASES Not on file      Number of children: Not on file      Years of education: Not on file      Highest education level: Not on file    Occupational History      Not on file    Social Needs      Financial resource strain: Not on file      Food insecurity: Narrative      Does use assistive device. Uses cane some of the time. Splint to r wrist. Soft collar neck. Has 12 stairs in home which she must use. Some days are more difficult than others.     Review of Systems  Pertinent items are noted in HP mild-mod diffuse & left mod-large foraminal, L3-4 mod diffuse, L2-3 right mod-large far lateral & mod diffuse, L1-2 mild-mod diffuse bulging discs    L5-S1 left mod, L4-5 right mod, L3-4 left mod-severe, L2-3 right mod-severe foraminal stenosis    L4-5 mod

## 2019-10-29 NOTE — PROGRESS NOTES
[facility] HOSP - [facility]  Progress Note    Kian Lee Patient Status:  Observation    1940 MRN [id number]   [place] [address] Attending Miriam Riley MD   Hosp Day # 0 PCP Loren Serrato MD       Subjective:   Continued

## 2019-10-29 NOTE — PROGRESS NOTES
[facility] Hospitalist Team  Progress Note    Douglass Slim Patient Status:  Observation    1940 MRN [id number]   Location [facility] 5SW/SE Attending Jerel Jones MD   Hosp Day # 0 PCP Sonia Johnson MD     CC: Follow Up  PCP: Sonia Johnson MD NP  [address] [facility] Team  Pager [phone]  Answering Service number: [phone]  10/29/2019    SUBJECTIVE:   Pain is not better per pt. As long as she doesn't move this helps with pain. Thinks spasms better with valium.  Her and lisa injection 4 mg, 4 mg, Intravenous, Q2H PRN  hydrALAzine HCl (APRESOLINE) tab 25 mg, 25 mg, Oral, Q8H PRN  traMADol HCl (ULTRAM) tab 50 mg, 50 mg, Oral, Q6H PRN  gabapentin (NEURONTIN) cap 100 mg, 100 mg, Oral, TID  influenza vaccine (PF)(FLUZONE HD) high d now  -LESI today with Dr Nisa Deng  -appreciate orthospine eval whe recommended MIS decompression and fusion +/- interbody fusion pending response to LESI      HTN-elevated but improved since admission   -in setting of pain   -continue toprol with holding para

## 2019-10-29 NOTE — PLAN OF CARE
Problem: Patient Centered Care  Goal: Patient preferences are identified and integrated in the patient's plan of care  Description  Interventions:  - What would you like us to know as we care for you?  I live with my  and usually walk around with a

## 2019-10-29 NOTE — PHYSICAL THERAPY NOTE
Orders received and chart reviewed. Discussed with LUIS Sams. Patient is observation status. Pending possible LESI vs kyphoplasty. Will defer physical therapy evaluation until workup complete.  Nursing staff to mobilize patient per physician's orders in t

## 2019-10-29 NOTE — DISCHARGE SUMMARY
Outpatient Surgery Brief Discharge Summary         Patient ID:  Madonna Allen  [id number]  78year old  2/25/1940    Discharge Diagnoses: Lumbar pain [M54.5]    Procedures: Caudal Epidural Steroid Injection under fluoroscopy guidance under local anesthes

## 2019-10-29 NOTE — H&P
[address] Patient Status:  Observation    1940 MRN [id number]   [place] [address] Attending Franny Virgen MD   Hosp Day # 0 PCP Abel Oropeza MD     Date of Admissio Family History   Problem Relation Age of Onset   • Cancer Mother         melanoma   • Heart Disease Mother    • Hypertension Mother    • Musculo-skelatal Disorder Mother         osteoporosis   • Heart Disease Father    • Pulmonary Disease Father    • H tablet, Rfl: 3, 10/27/2019 at Unknown time  Metoprolol Succinate  MG Oral Tablet 24 Hr, Take 1 tablet (100 mg total) by mouth once daily. , Disp: 90 tablet, Rfl: 3, 10/27/2019 at Unknown time  Calcium Carbonate-Vit D-Min (CALCIUM 1200 OR), Take 1 tabl 4-/5  Hamstring LEFT:   4-/5  Extensor Hallucis Longus RIGHT:   4-/5  Extensor Hallucis Longus LEFT:   4-/5   Reflexes: absent in bilateral lower extremities except:  Left patellar tendon which are 2+           Impression and Plan:  Patient Active Problem

## 2019-10-29 NOTE — OPERATIVE REPORT
Caudal Epidural steroid injection under fluoroscopy    Dx: lumbar radiculopathy    Description: Risks and benefits discussed, patient agreed to proceed. Informed consent obtained prior to procedure.     The patient was positioned prone on the fluoroscopy ta

## 2019-10-30 PROCEDURE — 99214 OFFICE O/P EST MOD 30 MIN: CPT | Performed by: PHYSICAL MEDICINE & REHABILITATION

## 2019-10-30 RX ORDER — AMLODIPINE BESYLATE 2.5 MG/1
2.5 TABLET ORAL DAILY
Status: DISCONTINUED | OUTPATIENT
Start: 2019-10-30 | End: 2019-10-31

## 2019-10-30 RX ORDER — GABAPENTIN 100 MG/1
100 CAPSULE ORAL 3 TIMES DAILY
Qty: 90 CAPSULE | Refills: 0 | Status: SHIPPED | OUTPATIENT
Start: 2019-10-30 | End: 2019-11-18 | Stop reason: ALTCHOICE

## 2019-10-30 RX ORDER — TRAMADOL HYDROCHLORIDE 50 MG/1
50 TABLET ORAL EVERY 6 HOURS PRN
Qty: 30 TABLET | Refills: 0 | Status: SHIPPED | OUTPATIENT
Start: 2019-10-30 | End: 2019-11-18 | Stop reason: ALTCHOICE

## 2019-10-30 NOTE — CM/SW NOTE
SW received MDO for POLST. No current DNR in [facility]. POLST form placed in pt's chart, will need all signatures. SW intern met with pt and her spouse to discuss eventual dc planning needs. Pt reported living in a 2 level home with her .  Pt's lisa

## 2019-10-30 NOTE — PLAN OF CARE
Problem: Patient Centered Care  Goal: Patient preferences are identified and integrated in the patient's plan of care  Description  Interventions:  - What would you like us to know as we care for you?  I live with my  and usually walk around with a activity based on assessment  - Modify environment to reduce risk of injury  - Provide assistive devices as appropriate  - Consider OT/PT consult to assist with strengthening/mobility  - Encourage toileting schedule  Outcome: Progressing    Pt Aox4, elevat

## 2019-10-30 NOTE — HOME CARE LIAISON
Met with patient at the bedside. Patient is agreeable to [facility]. Residential brochure provided with contact information. All questions addressed and answered.

## 2019-10-30 NOTE — OCCUPATIONAL THERAPY NOTE
OCCUPATIONAL THERAPY EVALUATION - INPATIENT     Room Number: 536/536-A  Evaluation Date: 10/30/2019  Type of Evaluation: Initial  Presenting Problem: lumbar radiculopathy    Physician Order: IP Consult to Occupational Therapy  Reason for Therapy: ADL/IADL may be able to stay on the main level. Currently, OT is recommending home with 24/7 and HH vs. KATJA pending progress and improvement in pain and activity tolerance.       DISCHARGE RECOMMENDATIONS  OT Discharge Recommendations: 24 hour care/supervision;Home fibroids/hyperplasia       Past Surgical History  Past Surgical History:   Procedure Laterality Date   • CARPAL TUNNEL RELEASE Right 11/28/2016    Performed by Jose D Miranda MD at [address] [facility]   • CATARACT      bilateral, Dr Luther Campos, [facility] TOLERANCE  Pulse: 97        BP: 133/76  BP Location: Right arm  BP Method: Manual  Patient Position: Lying    O2 SATURATIONS  SPO2 on Room Air at Rest: 93             COGNITION  Overall Cognitive Status:  WFL - within functional limits  Orientation Level: ind  Lower Extremity Dressing: Max A to don socks    Education Provided: role of OT  Patient End of Session: Needs met;Call light within reach;RN aware of session/findings; All patient questions and concerns addressed; Alarm set; Family present; In bed    OT G

## 2019-10-30 NOTE — PROGRESS NOTES
[facility] Hospitalist Team  Progress Note    Cayetano Ramirez Patient Status:  Observation    1940 MRN [id number]   Location [facility] 5SW/SE Attending Esau Shukla MD   Hosp Day # 0 PCP Emily Montalvo MD     CC: Follow Up  PCP: Emily Montalvo MD plan of care with Dr. Phyllis Wang RN, NP  [address] [facility] Team  Pager [phone]  Answering Service number: [phone]  10/30/2019    SUBJECTIVE:   Thinks pain might be better from her LESI. She hasn't gotten up yet.  Tells me TID  influenza vaccine (PF)(FLUZONE HD) high dose for 65 yrs & older inj 0.5ml, 0.5 mL, Intramuscular, Prior to discharge  acetaminophen (TYLENOL) tab 650 mg, 650 mg, Oral, Q6H PRN  diazepam (VALIUM) tab 5 mg, 5 mg, Oral, Q6H PRN  Desvenlafaxine Succinate 10/29  -appreciate orthospine eval whe recommended MIS decompression and fusion +/- interbody fusion pending response to LESI      HTN-elevated but improved since admission   -in setting of pain   -continue toprol with holding parameters  -low dose norvasc

## 2019-10-30 NOTE — DISCHARGE SUMMARY
[facility] Hospitalist Discharge Summary   Patient ID:  Daniella Light  [id number]  71 year old  2/25/1940    Admit date: 10/28/2019  Discharge date: 10/31/2019    Primary Care Physician: Jacy Rain MD   Attending Physician: Veronica Rome MD   Consults:   Cons called 911. Hospital Course:   79 yo female with htn, anxiety/depression, spinal stenosis, OP, seasonal allergies, rhinitis, HL and L5 compression fx who was scheduled for L5 compression fx today who presents with acute on chronic back pain.  Pt seen by injection.     Dictated by (CST): Wes Holcomb MD on 10/29/2019 at 15:34     Approved by (CST): Wes Holcomb MD on 10/29/2019 at 15:35            Discharge Instructions     Medication List      START taking these medications    gabapentin 100 MG Caps  C medications    ALPRAZolam 0.5 MG Tabs  Commonly known as:  XANAX     Cyclobenzaprine HCl 10 MG Tabs  Commonly known as:  cyclobenzaprine           Where to Get Your Medications      You can get these medications from any pharmacy    Bring a paper prescript

## 2019-10-30 NOTE — PROGRESS NOTES
[address]  OFFICE FOLLOW UP EVALUATION    Chief Complaint: bilateral back pain.     HISTORY OF PRESENT ILLNESS:   Patient presents with:  Back Pain      The patient is a 78year old female with significant p Diagnosis Date   • ALLERGIC RHINITIS    • Anxiety state    • Breast lump    • Cardiomegaly    • Constipation    • Depression    • High blood pressure    • High cholesterol    • IBS (irritable bowel syndrome)    • Osteoarthritis    • OSTEOPOROSIS     lumb 4 mg, Intravenous, Once  atorvastatin (LIPITOR) tab 20 mg, 20 mg, Oral, Nightly  HYDROcodone-acetaminophen (NORCO)  MG per tab 1 tablet, 1 tablet, Oral, Q4H PRN  Fluticasone Propionate (FLONASE) 50 MCG/ACT nasal spray 2 spray, 2 spray, Each Naredith, Regina Redd 7 Glasses of wine per week      Frequency: 4 or more times a week      Drinks per session: 1 or 2      Binge frequency: Never    Drug use: Never         REVIEW OF SYSTEMS:   Constitutional: negative for chills, fatigue, fevers and weight loss  Eyes: negati erythema, swelling, or obvious deformity. Their iliac crest and shoulder heights are symmetrical.     Palpation: No TTP of the spinous process.   Tenderness to palpation of the right lumbar paraspinals  ROM: Not assessed today  Strength: 5 out of 5 in bila canal stenosis and bilateral neural foraminal stenosis. A small right synovial cyst has developed since the prior MRI and protrudes into the right   paraspinal musculature.      3. Worsening asymmetric generalized circumferential disc bulge at L2-L3 contri consultation from IR for possible kyphoplasty. Additionally, would appreciate orthospine recommendations with regards to decompressive surgery.   Recommend to continue patient's current medications and to monitor to make sure patient is having regular marian

## 2019-10-30 NOTE — PHYSICAL THERAPY NOTE
PHYSICAL THERAPY EVALUATION - INPATIENT     Room Number: 536/536-A  Evaluation Date: 10/30/2019  Type of Evaluation: Initial   Physician Order: PT Eval and Treat    Presenting Problem: Compression fracture L5, spondylosis and spinal stenosis  Reason for T Recommendations: Home with home health PT    PLAN  PT Treatment Plan: Bed mobility; Body mechanics; Endurance; Patient education;Gait training;Strengthening;Stair training;Transfer training;Balance training  Rehab Potential : Good  Frequency (Obs): 3-5x/week thoracic DJD   • OTHER DISEASES     fibrocystic breast disease   • OTHER DISEASES     plantar fasciitis   • OTHER DISEASES     epicardial fat pad chest   • OTHER DISEASES     uterine fibroids/hyperplasia       Past Surgical History  Past Surgical History: PAIN ASSESSMENT  Ratin  Location: LB raditing to B LE       COGNITION  · Overall Cognitive Status:  WFL - within functional limits    RANGE OF MOTION AND STRENGTH ASSESSMENT  Upper extremity ROM and strength: see OT    Lower extremity ROM is within session/findings; Alarm set    CURRENT GOALS    Goals to be met by: 11/05/19  Patient Goal Patient's self-stated goal is: home   Goal #1 Patient is able to demonstrate supine - sit EOB @ level: modified independent     Goal #1   Current Status    Goal #2 Pa

## 2019-10-30 NOTE — PLAN OF CARE
Problem: Patient Centered Care  Goal: Patient preferences are identified and integrated in the patient's plan of care  Description  Interventions:  - What would you like us to know as we care for you?  I live with my  and usually walk around with a activity based on assessment  - Modify environment to reduce risk of injury  - Provide assistive devices as appropriate  - Consider OT/PT consult to assist with strengthening/mobility  - Encourage toileting schedule  Outcome: Progressing     Problem: CARDI

## 2019-10-31 VITALS
TEMPERATURE: 98 F | HEIGHT: 61 IN | RESPIRATION RATE: 16 BRPM | BODY MASS INDEX: 24.35 KG/M2 | SYSTOLIC BLOOD PRESSURE: 138 MMHG | DIASTOLIC BLOOD PRESSURE: 81 MMHG | OXYGEN SATURATION: 92 % | HEART RATE: 96 BPM | WEIGHT: 129 LBS

## 2019-10-31 RX ORDER — TIZANIDINE HYDROCHLORIDE 4 MG/1
4 CAPSULE, GELATIN COATED ORAL 3 TIMES DAILY PRN
Qty: 21 CAPSULE | Refills: 0 | Status: SHIPPED | OUTPATIENT
Start: 2019-10-31 | End: 2019-12-10 | Stop reason: ALTCHOICE

## 2019-10-31 RX ORDER — SENNA AND DOCUSATE SODIUM 50; 8.6 MG/1; MG/1
1 TABLET, FILM COATED ORAL DAILY
Qty: 14 TABLET | Refills: 0 | Status: SHIPPED | OUTPATIENT
Start: 2019-10-31 | End: 2019-11-14

## 2019-10-31 RX ORDER — HYDROCODONE BITARTRATE AND ACETAMINOPHEN 10; 325 MG/1; MG/1
1 TABLET ORAL EVERY 6 HOURS PRN
Qty: 30 TABLET | Refills: 0 | Status: SHIPPED | OUTPATIENT
Start: 2019-10-31 | End: 2019-11-14

## 2019-10-31 NOTE — PHYSICAL THERAPY NOTE
PHYSICAL THERAPY TREATMENT NOTE - INPATIENT     Room Number: 536/536-A       Presenting Problem: Compression fracture L5, spondylosis and spinal stenosis    Problem List  Principal Problem:    Compression fracture of L5 vertebra, initial encounter ([facility] [address]  A bed mobility. Pt is on track to dc to home today with Heather Artis once medically cleared. Reported to the RN on the status of the pt.       The patient's Approx Degree of Impairment: 41.77% has been calculated based on documentation in the [facility] '6 clicks' Inpatien Score: 19   Approx Degree of Impairment: 41.77%   Standardized Score (AM-PAC Scale): 45.44   CMS Modifier (G-Code): CK    FUNCTIONAL ABILITY STATUS  Gait Assessment   Gait Assistance: Supervision  Distance (ft): 350'  Assistive Device: Rolling walker  Faith

## 2019-10-31 NOTE — PROGRESS NOTES
Patient dc home. IV removed. Understands to follow up with PCP in 1 week. Patient understands scripts will be at pharmacy downstairs and to pick it up. Patient aware to follow up with md as recommended.

## 2019-10-31 NOTE — PLAN OF CARE
Belongings gathered and sent with family member. Prescriptions picked up at [facility]. Discharged in stable condition.   Problem: Patient Centered Care  Goal: Patient preferences are identified and integrated in the patient's plan of care  Description  Inte falls.  - [place]akin fall precautions as indicated by assessment.  - Educate pt/family on patient safety including physical limitations  - Instruct pt to call for assistance with activity based on assessment  - Modify environment to reduce risk of injury  -

## 2019-11-14 ENCOUNTER — OFFICE VISIT (OUTPATIENT)
Dept: NEUROLOGY | Facility: CLINIC | Age: 79
End: 2019-11-14
Payer: MEDICARE

## 2019-11-14 VITALS
DIASTOLIC BLOOD PRESSURE: 70 MMHG | SYSTOLIC BLOOD PRESSURE: 138 MMHG | HEART RATE: 85 BPM | BODY MASS INDEX: 24 KG/M2 | HEIGHT: 61 IN | RESPIRATION RATE: 17 BRPM

## 2019-11-14 DIAGNOSIS — S32.050D COMPRESSION FRACTURE OF L5 VERTEBRA WITH ROUTINE HEALING, SUBSEQUENT ENCOUNTER: ICD-10-CM

## 2019-11-14 DIAGNOSIS — M43.16 SPONDYLOLISTHESIS OF LUMBAR REGION: ICD-10-CM

## 2019-11-14 DIAGNOSIS — M51.26 LUMBAR HERNIATED DISC: ICD-10-CM

## 2019-11-14 DIAGNOSIS — M51.26 BULGING LUMBAR DISC: ICD-10-CM

## 2019-11-14 DIAGNOSIS — M48.062 SPINAL STENOSIS OF LUMBAR REGION WITH NEUROGENIC CLAUDICATION: ICD-10-CM

## 2019-11-14 DIAGNOSIS — M54.17 LUMBOSACRAL RADICULOPATHY AT L5: Primary | ICD-10-CM

## 2019-11-14 DIAGNOSIS — M43.10 RETROLISTHESIS OF VERTEBRAE: ICD-10-CM

## 2019-11-14 DIAGNOSIS — M48.061 LUMBAR FORAMINAL STENOSIS: ICD-10-CM

## 2019-11-14 PROCEDURE — 99214 OFFICE O/P EST MOD 30 MIN: CPT | Performed by: PHYSICAL MEDICINE & REHABILITATION

## 2019-11-14 RX ORDER — HYDROCODONE BITARTRATE AND ACETAMINOPHEN 10; 325 MG/1; MG/1
1 TABLET ORAL EVERY 6 HOURS PRN
Qty: 120 TABLET | Refills: 0 | Status: ON HOLD | OUTPATIENT
Start: 2019-11-16 | End: 2019-12-17

## 2019-11-14 NOTE — PATIENT INSTRUCTIONS
Plan  She will continue with the Neurontin for the pain and the Norco.    She will continue with the home health PT for now.     I spoke to her at length about considering having the spine surgery as recommended, but she needs to feel comfortable with t

## 2019-11-14 NOTE — PROGRESS NOTES
Low Back Pain H & P    Chief Complaint: Patient presents with:  Low Back Pain: pt is here for 3 month f/u LOV 10/22    Nursing note reviewed and verified. Patient was last seen on 10/22/19 as an outpatient and in the hospital on 10/28/19.   She had a cau HISTORY      ankle ligament repair   • OTHER SURGICAL HISTORY      deviated septum repair as child   • OTHER SURGICAL HISTORY      lumpectomy x 2 right breast   • OTHER SURGICAL HISTORY      ORIF right wrist    • PARTIAL HIP REPLACEMENT Left 07/06/2018 Minutes per session: Not on file      Stress: Not on file    Relationships      Social connections:        Talks on phone: Not on file        Gets together: Not on file        Attends Oriental orthodox service: Not on file        Active member of club or organ pulse-LEFT 2+   Tibialis posterior pulse-RIGHT 2+   Tibialis posterior pulse-LEFT 2+     Neurological Lower Extremity:    Light Touch Sensation: Intact in bilateral Lower Extremities   LE Muscle Strength:  All LE strength measurements 5/5 except:  Hamstring

## 2019-12-12 ENCOUNTER — ANESTHESIA EVENT (OUTPATIENT)
Dept: SURGERY | Facility: HOSPITAL | Age: 79
DRG: 454 | End: 2019-12-12
Payer: MEDICARE

## 2019-12-13 ENCOUNTER — HOSPITAL ENCOUNTER (INPATIENT)
Facility: HOSPITAL | Age: 79
LOS: 4 days | Discharge: HOME HEALTH | DRG: 454 | End: 2019-12-17
Attending: ORTHOPAEDIC SURGERY | Admitting: ORTHOPAEDIC SURGERY
Payer: MEDICARE

## 2019-12-13 ENCOUNTER — APPOINTMENT (OUTPATIENT)
Dept: GENERAL RADIOLOGY | Facility: HOSPITAL | Age: 79
DRG: 454 | End: 2019-12-13
Attending: ORTHOPAEDIC SURGERY
Payer: MEDICARE

## 2019-12-13 ENCOUNTER — ANESTHESIA (OUTPATIENT)
Dept: SURGERY | Facility: HOSPITAL | Age: 79
DRG: 454 | End: 2019-12-13
Payer: MEDICARE

## 2019-12-13 DIAGNOSIS — M43.16 SPONDYLOLISTHESIS AT L4-L5 LEVEL: ICD-10-CM

## 2019-12-13 PROCEDURE — 99291 CRITICAL CARE FIRST HOUR: CPT | Performed by: NURSE PRACTITIONER

## 2019-12-13 PROCEDURE — 4A11X4G MONITORING OF PERIPHERAL NERVOUS ELECTRICAL ACTIVITY, INTRAOPERATIVE, EXTERNAL APPROACH: ICD-10-PCS | Performed by: ORTHOPAEDIC SURGERY

## 2019-12-13 PROCEDURE — 0SG00AJ FUSION OF LUMBAR VERTEBRAL JOINT WITH INTERBODY FUSION DEVICE, POSTERIOR APPROACH, ANTERIOR COLUMN, OPEN APPROACH: ICD-10-PCS | Performed by: ORTHOPAEDIC SURGERY

## 2019-12-13 PROCEDURE — 76000 FLUOROSCOPY <1 HR PHYS/QHP: CPT | Performed by: ORTHOPAEDIC SURGERY

## 2019-12-13 PROCEDURE — 01NB0ZZ RELEASE LUMBAR NERVE, OPEN APPROACH: ICD-10-PCS | Performed by: ORTHOPAEDIC SURGERY

## 2019-12-13 PROCEDURE — 0ST20ZZ RESECTION OF LUMBAR VERTEBRAL DISC, OPEN APPROACH: ICD-10-PCS | Performed by: ORTHOPAEDIC SURGERY

## 2019-12-13 PROCEDURE — 0SG0071 FUSION OF LUMBAR VERTEBRAL JOINT WITH AUTOLOGOUS TISSUE SUBSTITUTE, POSTERIOR APPROACH, POSTERIOR COLUMN, OPEN APPROACH: ICD-10-PCS | Performed by: ORTHOPAEDIC SURGERY

## 2019-12-13 DEVICE — LITE BLOCKER
Type: IMPLANTABLE DEVICE | Site: BACK | Status: FUNCTIONAL
Brand: MANTIS REDUX

## 2019-12-13 DEVICE — LONG BLADE CANNULATED SCREW
Type: IMPLANTABLE DEVICE | Site: BACK | Status: FUNCTIONAL
Brand: ES2 SPINAL SYSTEM

## 2019-12-13 DEVICE — GRAFT BN ALLO 3D BN MTRX: Type: IMPLANTABLE DEVICE | Site: BACK | Status: FUNCTIONAL

## 2019-12-13 DEVICE — CAGE SPINAL CASCADIA 12D: Type: IMPLANTABLE DEVICE | Site: BACK | Status: FUNCTIONAL

## 2019-12-13 RX ORDER — IBANDRONATE SODIUM 150 MG/1
150 TABLET, FILM COATED ORAL
Status: DISCONTINUED | OUTPATIENT
Start: 2019-12-13 | End: 2019-12-14

## 2019-12-13 RX ORDER — HYDROCODONE BITARTRATE AND ACETAMINOPHEN 5; 325 MG/1; MG/1
2 TABLET ORAL AS NEEDED
Status: COMPLETED | OUTPATIENT
Start: 2019-12-13 | End: 2019-12-13

## 2019-12-13 RX ORDER — HYDROCODONE BITARTRATE AND ACETAMINOPHEN 5; 325 MG/1; MG/1
1 TABLET ORAL AS NEEDED
Status: COMPLETED | OUTPATIENT
Start: 2019-12-13 | End: 2019-12-13

## 2019-12-13 RX ORDER — METOCLOPRAMIDE HYDROCHLORIDE 5 MG/ML
10 INJECTION INTRAMUSCULAR; INTRAVENOUS EVERY 6 HOURS PRN
Status: DISCONTINUED | OUTPATIENT
Start: 2019-12-13 | End: 2019-12-17

## 2019-12-13 RX ORDER — MORPHINE SULFATE 4 MG/ML
1 INJECTION, SOLUTION INTRAMUSCULAR; INTRAVENOUS EVERY 2 HOUR PRN
Status: DISCONTINUED | OUTPATIENT
Start: 2019-12-13 | End: 2019-12-17

## 2019-12-13 RX ORDER — SODIUM PHOSPHATE, DIBASIC AND SODIUM PHOSPHATE, MONOBASIC 7; 19 G/133ML; G/133ML
1 ENEMA RECTAL ONCE AS NEEDED
Status: DISCONTINUED | OUTPATIENT
Start: 2019-12-13 | End: 2019-12-17

## 2019-12-13 RX ORDER — DESVENLAFAXINE 100 MG/1
100 TABLET, EXTENDED RELEASE ORAL DAILY
Status: DISCONTINUED | OUTPATIENT
Start: 2019-12-13 | End: 2019-12-17

## 2019-12-13 RX ORDER — LIDOCAINE HYDROCHLORIDE 10 MG/ML
INJECTION, SOLUTION EPIDURAL; INFILTRATION; INTRACAUDAL; PERINEURAL AS NEEDED
Status: DISCONTINUED | OUTPATIENT
Start: 2019-12-13 | End: 2019-12-13 | Stop reason: SURG

## 2019-12-13 RX ORDER — ZOLPIDEM TARTRATE 5 MG/1
5 TABLET ORAL NIGHTLY PRN
Status: DISCONTINUED | OUTPATIENT
Start: 2019-12-13 | End: 2019-12-17

## 2019-12-13 RX ORDER — EPHEDRINE SULFATE 50 MG/ML
INJECTION, SOLUTION INTRAVENOUS AS NEEDED
Status: DISCONTINUED | OUTPATIENT
Start: 2019-12-13 | End: 2019-12-13 | Stop reason: SURG

## 2019-12-13 RX ORDER — NALOXONE HYDROCHLORIDE 0.4 MG/ML
80 INJECTION, SOLUTION INTRAMUSCULAR; INTRAVENOUS; SUBCUTANEOUS AS NEEDED
Status: DISCONTINUED | OUTPATIENT
Start: 2019-12-13 | End: 2019-12-13 | Stop reason: HOSPADM

## 2019-12-13 RX ORDER — MIDAZOLAM HYDROCHLORIDE 1 MG/ML
INJECTION INTRAMUSCULAR; INTRAVENOUS
Status: COMPLETED
Start: 2019-12-13 | End: 2019-12-13

## 2019-12-13 RX ORDER — CETIRIZINE HYDROCHLORIDE 10 MG/1
10 TABLET ORAL EVERY EVENING
Status: DISCONTINUED | OUTPATIENT
Start: 2019-12-13 | End: 2019-12-17

## 2019-12-13 RX ORDER — LABETALOL HYDROCHLORIDE 5 MG/ML
5 INJECTION, SOLUTION INTRAVENOUS EVERY 5 MIN PRN
Status: DISCONTINUED | OUTPATIENT
Start: 2019-12-13 | End: 2019-12-13 | Stop reason: HOSPADM

## 2019-12-13 RX ORDER — SENNOSIDES 8.6 MG
17.2 TABLET ORAL NIGHTLY
Status: DISCONTINUED | OUTPATIENT
Start: 2019-12-13 | End: 2019-12-17

## 2019-12-13 RX ORDER — POLYETHYLENE GLYCOL 3350 17 G/17G
17 POWDER, FOR SOLUTION ORAL DAILY PRN
Status: DISCONTINUED | OUTPATIENT
Start: 2019-12-13 | End: 2019-12-17

## 2019-12-13 RX ORDER — METOPROLOL SUCCINATE 100 MG/1
100 TABLET, EXTENDED RELEASE ORAL
Status: DISCONTINUED | OUTPATIENT
Start: 2019-12-13 | End: 2019-12-17

## 2019-12-13 RX ORDER — HYDROMORPHONE HYDROCHLORIDE 1 MG/ML
INJECTION, SOLUTION INTRAMUSCULAR; INTRAVENOUS; SUBCUTANEOUS
Status: COMPLETED
Start: 2019-12-13 | End: 2019-12-13

## 2019-12-13 RX ORDER — MULTIPLE VITAMINS W/ MINERALS TAB 9MG-400MCG
1 TAB ORAL DAILY
Status: DISCONTINUED | OUTPATIENT
Start: 2019-12-13 | End: 2019-12-17

## 2019-12-13 RX ORDER — BUPIVACAINE HYDROCHLORIDE 2.5 MG/ML
INJECTION, SOLUTION EPIDURAL; INFILTRATION; INTRACAUDAL AS NEEDED
Status: DISCONTINUED | OUTPATIENT
Start: 2019-12-13 | End: 2019-12-13 | Stop reason: HOSPADM

## 2019-12-13 RX ORDER — ONDANSETRON 2 MG/ML
4 INJECTION INTRAMUSCULAR; INTRAVENOUS EVERY 4 HOURS PRN
Status: ACTIVE | OUTPATIENT
Start: 2019-12-13 | End: 2019-12-14

## 2019-12-13 RX ORDER — DEXAMETHASONE SODIUM PHOSPHATE 4 MG/ML
VIAL (ML) INJECTION AS NEEDED
Status: DISCONTINUED | OUTPATIENT
Start: 2019-12-13 | End: 2019-12-13 | Stop reason: SURG

## 2019-12-13 RX ORDER — PREGABALIN 50 MG/1
50 CAPSULE ORAL 2 TIMES DAILY
Status: DISCONTINUED | OUTPATIENT
Start: 2019-12-13 | End: 2019-12-17

## 2019-12-13 RX ORDER — HYDROCODONE BITARTRATE AND ACETAMINOPHEN 5; 325 MG/1; MG/1
TABLET ORAL
Status: COMPLETED
Start: 2019-12-13 | End: 2019-12-13

## 2019-12-13 RX ORDER — SODIUM CHLORIDE, SODIUM LACTATE, POTASSIUM CHLORIDE, CALCIUM CHLORIDE 600; 310; 30; 20 MG/100ML; MG/100ML; MG/100ML; MG/100ML
INJECTION, SOLUTION INTRAVENOUS CONTINUOUS
Status: DISCONTINUED | OUTPATIENT
Start: 2019-12-13 | End: 2019-12-13 | Stop reason: HOSPADM

## 2019-12-13 RX ORDER — CEFAZOLIN SODIUM/WATER 2 G/20 ML
SYRINGE (ML) INTRAVENOUS AS NEEDED
Status: DISCONTINUED | OUTPATIENT
Start: 2019-12-13 | End: 2019-12-13 | Stop reason: SURG

## 2019-12-13 RX ORDER — CALCIUM CARBONATE/VITAMIN D3 250-3.125
1 TABLET ORAL DAILY
Status: DISCONTINUED | OUTPATIENT
Start: 2019-12-13 | End: 2019-12-17

## 2019-12-13 RX ORDER — HYDROMORPHONE HYDROCHLORIDE 1 MG/ML
0.4 INJECTION, SOLUTION INTRAMUSCULAR; INTRAVENOUS; SUBCUTANEOUS EVERY 5 MIN PRN
Status: DISCONTINUED | OUTPATIENT
Start: 2019-12-13 | End: 2019-12-13 | Stop reason: HOSPADM

## 2019-12-13 RX ORDER — BISACODYL 10 MG
10 SUPPOSITORY, RECTAL RECTAL
Status: DISCONTINUED | OUTPATIENT
Start: 2019-12-13 | End: 2019-12-17

## 2019-12-13 RX ORDER — DIPHENHYDRAMINE HYDROCHLORIDE 50 MG/ML
25 INJECTION INTRAMUSCULAR; INTRAVENOUS EVERY 4 HOURS PRN
Status: DISCONTINUED | OUTPATIENT
Start: 2019-12-13 | End: 2019-12-17

## 2019-12-13 RX ORDER — ONDANSETRON 2 MG/ML
4 INJECTION INTRAMUSCULAR; INTRAVENOUS AS NEEDED
Status: DISCONTINUED | OUTPATIENT
Start: 2019-12-13 | End: 2019-12-13 | Stop reason: HOSPADM

## 2019-12-13 RX ORDER — PHENYLEPHRINE HCL 10 MG/ML
VIAL (ML) INJECTION AS NEEDED
Status: DISCONTINUED | OUTPATIENT
Start: 2019-12-13 | End: 2019-12-13 | Stop reason: SURG

## 2019-12-13 RX ORDER — ACETAMINOPHEN 500 MG
1000 TABLET ORAL ONCE AS NEEDED
Status: DISCONTINUED | OUTPATIENT
Start: 2019-12-13 | End: 2019-12-13 | Stop reason: HOSPADM

## 2019-12-13 RX ORDER — ATORVASTATIN CALCIUM 20 MG/1
20 TABLET, FILM COATED ORAL NIGHTLY
Status: DISCONTINUED | OUTPATIENT
Start: 2019-12-13 | End: 2019-12-17

## 2019-12-13 RX ORDER — ONDANSETRON 2 MG/ML
INJECTION INTRAMUSCULAR; INTRAVENOUS AS NEEDED
Status: DISCONTINUED | OUTPATIENT
Start: 2019-12-13 | End: 2019-12-13 | Stop reason: SURG

## 2019-12-13 RX ORDER — DOCUSATE SODIUM 100 MG/1
100 CAPSULE, LIQUID FILLED ORAL 2 TIMES DAILY
Status: DISCONTINUED | OUTPATIENT
Start: 2019-12-13 | End: 2019-12-17

## 2019-12-13 RX ORDER — DIPHENHYDRAMINE HCL 25 MG
25 CAPSULE ORAL EVERY 6 HOURS PRN
Status: DISCONTINUED | OUTPATIENT
Start: 2019-12-13 | End: 2019-12-13 | Stop reason: DRUGHIGH

## 2019-12-13 RX ORDER — MOMETASONE 50 UG/1
1 SPRAY, METERED NASAL DAILY
Status: DISCONTINUED | OUTPATIENT
Start: 2019-12-13 | End: 2019-12-17

## 2019-12-13 RX ORDER — ALPRAZOLAM 0.5 MG/1
0.5 TABLET ORAL NIGHTLY PRN
Status: DISCONTINUED | OUTPATIENT
Start: 2019-12-13 | End: 2019-12-17

## 2019-12-13 RX ORDER — TIZANIDINE 2 MG/1
2 TABLET ORAL 3 TIMES DAILY PRN
Status: DISCONTINUED | OUTPATIENT
Start: 2019-12-13 | End: 2019-12-17

## 2019-12-13 RX ORDER — MORPHINE SULFATE 4 MG/ML
INJECTION, SOLUTION INTRAMUSCULAR; INTRAVENOUS
Status: COMPLETED
Start: 2019-12-13 | End: 2019-12-13

## 2019-12-13 RX ORDER — METOCLOPRAMIDE HYDROCHLORIDE 5 MG/ML
10 INJECTION INTRAMUSCULAR; INTRAVENOUS AS NEEDED
Status: DISCONTINUED | OUTPATIENT
Start: 2019-12-13 | End: 2019-12-13 | Stop reason: HOSPADM

## 2019-12-13 RX ORDER — MIDAZOLAM HYDROCHLORIDE 1 MG/ML
1 INJECTION INTRAMUSCULAR; INTRAVENOUS EVERY 5 MIN PRN
Status: DISCONTINUED | OUTPATIENT
Start: 2019-12-13 | End: 2019-12-13 | Stop reason: HOSPADM

## 2019-12-13 RX ORDER — MORPHINE SULFATE 4 MG/ML
4 INJECTION, SOLUTION INTRAMUSCULAR; INTRAVENOUS EVERY 2 HOUR PRN
Status: DISCONTINUED | OUTPATIENT
Start: 2019-12-13 | End: 2019-12-17

## 2019-12-13 RX ORDER — CEFAZOLIN SODIUM/WATER 2 G/20 ML
2 SYRINGE (ML) INTRAVENOUS EVERY 8 HOURS
Status: COMPLETED | OUTPATIENT
Start: 2019-12-13 | End: 2019-12-14

## 2019-12-13 RX ORDER — MORPHINE SULFATE 15 MG/1
15 TABLET ORAL EVERY 6 HOURS
Status: COMPLETED | OUTPATIENT
Start: 2019-12-13 | End: 2019-12-15

## 2019-12-13 RX ORDER — CELECOXIB 200 MG/1
200 CAPSULE ORAL DAILY
Status: DISCONTINUED | OUTPATIENT
Start: 2019-12-13 | End: 2019-12-17

## 2019-12-13 RX ORDER — DIPHENHYDRAMINE HCL 25 MG
25 CAPSULE ORAL EVERY 4 HOURS PRN
Status: DISCONTINUED | OUTPATIENT
Start: 2019-12-13 | End: 2019-12-17

## 2019-12-13 RX ORDER — OXYCODONE HYDROCHLORIDE 5 MG/1
5 TABLET ORAL EVERY 6 HOURS
Status: COMPLETED | OUTPATIENT
Start: 2019-12-13 | End: 2019-12-15

## 2019-12-13 RX ORDER — SODIUM CHLORIDE, SODIUM LACTATE, POTASSIUM CHLORIDE, CALCIUM CHLORIDE 600; 310; 30; 20 MG/100ML; MG/100ML; MG/100ML; MG/100ML
INJECTION, SOLUTION INTRAVENOUS CONTINUOUS
Status: DISCONTINUED | OUTPATIENT
Start: 2019-12-13 | End: 2019-12-17

## 2019-12-13 RX ORDER — MORPHINE SULFATE 4 MG/ML
2 INJECTION, SOLUTION INTRAMUSCULAR; INTRAVENOUS EVERY 2 HOUR PRN
Status: DISCONTINUED | OUTPATIENT
Start: 2019-12-13 | End: 2019-12-17

## 2019-12-13 RX ORDER — VANCOMYCIN HYDROCHLORIDE 1 G/20ML
INJECTION, POWDER, LYOPHILIZED, FOR SOLUTION INTRAVENOUS AS NEEDED
Status: DISCONTINUED | OUTPATIENT
Start: 2019-12-13 | End: 2019-12-13 | Stop reason: HOSPADM

## 2019-12-13 RX ORDER — DIAZEPAM 5 MG/1
5 TABLET ORAL EVERY 6 HOURS PRN
Status: DISCONTINUED | OUTPATIENT
Start: 2019-12-13 | End: 2019-12-17

## 2019-12-13 RX ORDER — ACETAMINOPHEN 500 MG
1000 TABLET ORAL ONCE
Status: DISCONTINUED | OUTPATIENT
Start: 2019-12-13 | End: 2019-12-15

## 2019-12-13 RX ORDER — MEPERIDINE HYDROCHLORIDE 25 MG/ML
12.5 INJECTION INTRAMUSCULAR; INTRAVENOUS; SUBCUTANEOUS AS NEEDED
Status: DISCONTINUED | OUTPATIENT
Start: 2019-12-13 | End: 2019-12-13 | Stop reason: HOSPADM

## 2019-12-13 RX ADMIN — DEXAMETHASONE SODIUM PHOSPHATE 8 MG: 4 MG/ML VIAL (ML) INJECTION at 07:40:00

## 2019-12-13 RX ADMIN — LIDOCAINE HYDROCHLORIDE 50 MG: 10 INJECTION, SOLUTION EPIDURAL; INFILTRATION; INTRACAUDAL; PERINEURAL at 07:34:00

## 2019-12-13 RX ADMIN — SODIUM CHLORIDE, SODIUM LACTATE, POTASSIUM CHLORIDE, CALCIUM CHLORIDE: 600; 310; 30; 20 INJECTION, SOLUTION INTRAVENOUS at 09:45:10

## 2019-12-13 RX ADMIN — PHENYLEPHRINE HCL 100 MCG: 10 MG/ML VIAL (ML) INJECTION at 07:57:00

## 2019-12-13 RX ADMIN — SODIUM CHLORIDE, SODIUM LACTATE, POTASSIUM CHLORIDE, CALCIUM CHLORIDE: 600; 310; 30; 20 INJECTION, SOLUTION INTRAVENOUS at 10:37:00

## 2019-12-13 RX ADMIN — SODIUM CHLORIDE, SODIUM LACTATE, POTASSIUM CHLORIDE, CALCIUM CHLORIDE: 600; 310; 30; 20 INJECTION, SOLUTION INTRAVENOUS at 09:45:00

## 2019-12-13 RX ADMIN — PHENYLEPHRINE HCL 100 MCG: 10 MG/ML VIAL (ML) INJECTION at 07:50:00

## 2019-12-13 RX ADMIN — ONDANSETRON 4 MG: 2 INJECTION INTRAMUSCULAR; INTRAVENOUS at 10:10:00

## 2019-12-13 RX ADMIN — EPHEDRINE SULFATE 10 MG: 50 INJECTION, SOLUTION INTRAVENOUS at 09:36:00

## 2019-12-13 RX ADMIN — CEFAZOLIN SODIUM/WATER 2 G: 2 G/20 ML SYRINGE (ML) INTRAVENOUS at 07:46:00

## 2019-12-13 NOTE — ANESTHESIA PROCEDURE NOTES
Airway  Date/Time: 12/13/2019 7:36 AM  Urgency: elective    Airway not difficult    General Information and Staff    Patient location during procedure: OR  Anesthesiologist: Monica Garcia MD  Performed: anesthesiologist     Indications and Patient Cond

## 2019-12-13 NOTE — OPERATIVE REPORT
Operative Note     Patient Name:Mariaelena Rojo  Preoperative Diagnosis:   1.) Lumbar Stenosis with Neurogenic Claudication    Postoperative Diagnosis: Same  Primary Surgeon: Jaimee Latif MD  Assistant: Rosemary ACUÑA     Procedures: L4/5 TLIF le The patient was met in the preop holding area, we reviewed elements of the patient's history and physical examination along with the planned surgical procedure. The patient was in agreement and the surgical site was marked with indelible ink. AP and lateral flouroscopic images were taken throughout the procedure to aid in instrumentation placement. Perfect APs werer used to supriya out the lateral border of the pedicles bilaterally at the proposed treated levels.  Skin incision was made With the appropriate screws placed, attention was directed to the TLIF. A table mounted retractor was secured to the ipsilateral screws and secured to the table for rigidity. A medial blade was used after elevation of the multifidus off the lamina.  The in 2 rods of appropriate length were selected and contoured to shape and reduced to the tulip heads. The caps were then tightened to the screw 's predetermined specification utilizing a torque .  The construct was the evaluated fluoroscopical

## 2019-12-13 NOTE — H&P
Patient: [address] Record Number: [id number]  Referring Physician:  No ref.  provider found  PCP: Ford Madrid MD        HISTORY OF CHIEF COMPLAINT:    Ava Foster is a 78year old female, who was admitted with progressive worseni • IBS (irritable bowel syndrome)     • Osteoarthritis     • OSTEOPOROSIS       lumbar spine   • OTHER DISEASES       lumbar spinal stenosis   • OTHER DISEASES       L4/L5 spondlyolisthesis   • OTHER DISEASES       thoracic DJD   • OTHER DISEASES       fibr Years of education: Not on file      Highest education level: Not on file    Tobacco Use      Smoking status: Never Smoker      Smokeless tobacco: Never Used    Substance and Sexual Activity      Alcohol use:  Yes        Alcohol/week: 7.0 standard drink    Reflexes:         Patient demonstrates normal reflexes      Straight Leg Testing: Was neg     Special Tests:      Test Right   (POS or NEG) Left   (POS or NEG)   Dubon Neg Neg   Isaura's Neg Neg   Babinski Neg Neg   Clonus Neg Neg      EYES: EOMI, RACHELLE With the patient kyphoplasty would preclude any sort of fixation at L5 in the future and the patient still has this problematic L4-5 spondylolisthesis. We agreed on L4-5 TLIF L5 kyphoplasty on the same day.   The patient is in agreement with this plan I ha We discussed the risks of surgery including, but not limited to, infection, nerve damage, vascular damage, cerebrospinal fluid leak, residual pain, scarring, failure of fusion requiring further surgery such as a second stage revision, compressive hematoma

## 2019-12-13 NOTE — ANESTHESIA PREPROCEDURE EVALUATION
PRE-OP EVALUATION    Patient Name: Madonna Agent    Pre-op Diagnosis: Spondylolisthesis at L4-L5 level [M43.16]    Procedure(s):  Lumbar 4-Lumbar 5 Transforaminal Lumbar Interbody Fusion, Kyphoplasty Lumbar 5, Bone marrow aspirate        Surgeon(s) and R tablet, Rfl: 3  Calcium Carbonate-Vit D-Min (CALCIUM 1200 OR), Take 1 tablet by mouth daily. , Disp: , Rfl:   Omega-3 Fatty Acids (FISH OIL CONCENTRATE OR), Take 1 tablet by mouth daily.   , Disp: , Rfl:   EPINEPHrine (EPIPEN 2-PADMINI) 0.3 MG/0.3ML Injection mod, L2-3 mod central stenosis Status post left hip replacement: partial after a fall  Acute left-sided thoracic back pain Compression fracture of L5 vertebra with delayed healing  Compression fracture of fifth lumbar vertebra (HCC) Compression fracture of .9 (H) 10/30/2019    MCH 33.1 10/30/2019    MCHC 32.9 10/30/2019    RDW 14.7 10/30/2019    .0 10/30/2019     Lab Results   Component Value Date     12/11/2019    K 4.21 12/11/2019    CL 97 (L) 12/11/2019    CO2 25.5 12/11/2019    BUN 11

## 2019-12-13 NOTE — ANESTHESIA PROCEDURE NOTES
Peripheral IV  Date/Time: 12/13/2019 9:35 AM  Inserted by: Wilbert Hinojosa MD    Placement  Needle size: 18 G  Laterality: left  Location: hand  Local anesthetic: none  Site prep: alcohol  Technique: anatomical landmarks  Attempts: 1

## 2019-12-13 NOTE — BRIEF OP NOTE
Pre-Operative Diagnosis: Spondylolisthesis at L4-L5 level [M43.16]     Post-Operative Diagnosis: Spondylolisthesis at L4-L5 level [M43.16]      Procedure Performed:   Procedure(s):  Lumbar 4-Lumbar 5 Transforaminal Lumbar Interbody Fusion, Bone marrow aspi

## 2019-12-13 NOTE — ANESTHESIA POSTPROCEDURE EVALUATION
[address] [place] [address] Patient Status:  Surgery Admit - Inpt   Age/Gender 78year old female MRN [id number]   [facility] SURGERY Attending Carole Oneill MD   Hosp Day # 0 PCP Jacy Rain MD       Anesthesia Post-op Note    Pro

## 2019-12-14 ENCOUNTER — APPOINTMENT (OUTPATIENT)
Dept: CT IMAGING | Facility: HOSPITAL | Age: 79
DRG: 454 | End: 2019-12-14
Attending: ORTHOPAEDIC SURGERY
Payer: MEDICARE

## 2019-12-14 PROCEDURE — 74177 CT ABD & PELVIS W/CONTRAST: CPT | Performed by: ORTHOPAEDIC SURGERY

## 2019-12-14 PROCEDURE — 30233N1 TRANSFUSION OF NONAUTOLOGOUS RED BLOOD CELLS INTO PERIPHERAL VEIN, PERCUTANEOUS APPROACH: ICD-10-PCS | Performed by: ORTHOPAEDIC SURGERY

## 2019-12-14 RX ORDER — POTASSIUM CHLORIDE 20 MEQ/1
40 TABLET, EXTENDED RELEASE ORAL EVERY 4 HOURS
Status: COMPLETED | OUTPATIENT
Start: 2019-12-14 | End: 2019-12-14

## 2019-12-14 RX ORDER — SODIUM CHLORIDE 9 MG/ML
INJECTION, SOLUTION INTRAVENOUS ONCE
Status: COMPLETED | OUTPATIENT
Start: 2019-12-14 | End: 2019-12-14

## 2019-12-14 RX ORDER — ALENDRONATE SODIUM 70 MG/1
70 TABLET ORAL
Status: DISCONTINUED | OUTPATIENT
Start: 2019-12-15 | End: 2019-12-17

## 2019-12-14 RX ORDER — LOPERAMIDE HYDROCHLORIDE 2 MG/1
2 CAPSULE ORAL 4 TIMES DAILY PRN
Status: DISCONTINUED | OUTPATIENT
Start: 2019-12-14 | End: 2019-12-17

## 2019-12-14 NOTE — PROGRESS NOTES
Spine Service Progress Note    24hrs/Events:   Drop in BP and low Hemoglobin so transfused 1 unit    Subjective:   Doing well, legs feel better.  Strength better    Objective:   /59   Pulse 97   Temp 98.6 °F (37 °C)   Resp 14   Ht 5' 1\" (1.549 m) - Dispo: likely home [address] Surgeons MD Deloris  Spine and Scoliosis Surgeon  [address]

## 2019-12-14 NOTE — CONSULTS
[facility] Hospitalist Team  Initial Consult          Assessment/Plan:       S/P  L4/5 TLIF left sided approach  - Plan per surgery. Continue PT/OT. Pain is currently controlled.        #UTI  -reviewed culture, cont iv ceftriaxone 12/13-    #Anemia - 2/2 acute b fibroids/hyperplasia   • Rheumatic fever in pediatric patient    • Visual impairment     glasses prn      Past Surgical History:   Procedure Laterality Date   • CARPAL TUNNEL RELEASE Right 11/28/2016    Performed by Colleen Olivarez MD at [place] [address] vision. ENT:  No earache, sore throat or runny nose. CARDIOVASCULAR:  No chest pain  RESPIRATORY:  No cough, shortness of breath, PND or orthopnea. GASTROINTESTINAL:  No nausea, vomiting or diarrhea. GENITOURINARY:  No dysuria, frequency or urgency. 97* 105 107   CO2 25.5 29.0 29.0   BUN 11.0 12 11   CREATSERUM 0.47* 0.70 0.69   CA  --  8.6 7.8*    172* 163*       Recent Labs   Lab 12/11/19  1304   ALT 6   AST 15   ALB 3.5       No results for input(s): PGLU in the last 168 hours.     No results

## 2019-12-14 NOTE — PLAN OF CARE
Received report from PACU and admitted pt to ICU around 1800. Pt A+OX4, VSS, on room air. Pt c/o severe lower back pain near incision- morphine IV PRN administered, without much relief. Morphine PO administered per orders.  Surgical dressing clean, dry, int

## 2019-12-14 NOTE — PROGRESS NOTES
ICU  Critical Care APRN Progress Note    NAME: Manolo Velásquez - ROOM: 897/017-O - MRN: [id number] - Age: 78year old - :1940    History Of Present Illness:  Manolo Velásquez is a 78year old female with PMHx significant for HTN, depression and L4/L5 Mother    • Musculo-skelatal Disorder Mother         osteoporosis   • Heart Disease Father    • Pulmonary Disease Father    • Hypertension Father    • Cancer Father         lymphoma   • Breast Cancer Paternal Aunt         post menopause         Review of S significant for HTN, depression and L4/L5 spondylolisthesis who is being admitted to the ICU s/p L4/L5 TLIF per Dr. Malu Howard.     L4/L5 Spondylolisthesis s/p L4/L5 TLIF:  -Per Dr. Malu Howard on 12/13  -Pain management per surgery  -Post-op BMP pending  -ANNABELLA drain-->

## 2019-12-14 NOTE — PROGRESS NOTES
[facility] Pharmacy Note: Antimicrobial Weight Based Dose Adjustment for: cefazolin (ANCEF)    Kelechi Boateng is a 78year old female who has been prescribed cefazolin (ANCEF) 3 g every 8 hours.       Estimated Creatinine Clearance: 49.2 mL/min (based on SCr of 0

## 2019-12-14 NOTE — PLAN OF CARE
Pt refused to get up, she wants to nap. Pt BP low Md notified 500 cc bolus given, smaller cuff applied and stat Hgb drawn. Pt asymptomatic.

## 2019-12-14 NOTE — PROGRESS NOTES
Became hypotensive. Better after fluid bolus. Hgb further drop. Transfusion ordered.   Holding off on floor transfer

## 2019-12-14 NOTE — CONSULTS
Critical Care H&P/Consult     NAME: Aliyah Morales - ROOM: [address] [place] [address] - MRN: [id number] - Age: 78year old - :  1940    Date of Admission: 2019  5:43 AM  Admission Diagnosis: Spondylolisthesis at L4-L5 level [M43.16]  Spondylolisthesis at L4-L MD at [address] [facility]   • CATARACT      bilateral, Dr Soila Yoo, [facility]   • CATARACT EXTRACTION Right 11/24/14   • D & C      x 2   • FOREARM/WRIST SURGERY UNLISTED Right     remove plate from wrist    • HIP REPLACEMENT SURGERY      left hi file.  [START ON 12/15/2019] alendronate (FOSAMAX) tab 70 mg, 70 mg, Oral, Q7 Days  lactated ringers infusion, , Intravenous, Continuous  [MAR Hold] acetaminophen (TYLENOL EXTRA STRENGTH) tab 1,000 mg, 1,000 mg, Oral, Once  [COMPLETED] HYDROcodone-acetamin PRN  ondansetron HCl (ZOFRAN) injection 4 mg, 4 mg, Intravenous, Q4H PRN  Metoclopramide HCl (REGLAN) injection 10 mg, 10 mg, Intravenous, Q6H PRN  diphenhydrAMINE (BENADRYL) cap/tab 25 mg, 25 mg, Oral, Q4H PRN    Or  diphenhydrAMINE HCl (BENADRYL) injecti HCT 22.8*   MCV 99.1   MCH 32.2   MCHC 32.5   RDW 11.9   WBC 9.1   .0     Recent Labs   Lab 12/11/19  1304 12/13/19  1819 12/14/19  0437    172* 163*   BUN 11.0 12 11   CREATSERUM 0.47* 0.70 0.69   GFRAA  --  95 96   GFRNAA  --  83 83   CA

## 2019-12-14 NOTE — PLAN OF CARE
Received pt alert, oriented, able to follow commands. Able to move all extremities and sensation intact to BUE and BLE. On room air transitioned to 2L nasal cannula with sleep. Diminished breath sounds. Temperature max 99.7.  Normal sinus rhythm/sinus tachy

## 2019-12-15 RX ORDER — HYDROCODONE BITARTRATE AND ACETAMINOPHEN 10; 325 MG/1; MG/1
1 TABLET ORAL EVERY 6 HOURS PRN
Status: DISCONTINUED | OUTPATIENT
Start: 2019-12-15 | End: 2019-12-17

## 2019-12-15 NOTE — PROGRESS NOTES
[facility] Hospitalist Progress Note     [facility]      SUBJECTIVE:  Feeling better  Pain ok with pain meds  Worse with movement  No N/V  No bleeding    OBJECTIVE:  Temp:  [98.1 °F (36.7 °C)-99.8 °F (37.7 °C)] 99 °F (37.2 °C)  Pulse:  [] 110  Resp: <1 HOUR  (CPT=76000)  INDICATIONS:  Lumbar fusion  COMPARISON:  None.    TECHNIQUE:  FLUOROSCOPY IMAGES OBTAINED:  6 IMAGES FLUOROSCOPY TIME:  1 MINUTE 30 SECONDS TECHNOLOGIST TIME:  2 HOURS AND 30 MINUTES RADIATION DOSE (AIR KERMA PRODUCT):  29.89mGy KIDNEYS:  No hydronephrosis. 3.3 cm dominant cyst in the left kidney. There are smaller low-attenuation foci in both kidneys too small to accurately characterize. Nonobstructive right nephrolithiasis.  ADRENALS:  Normal. AORTA/VASCULAR:  Dense aortoiliac collection measuring 8.0 x 6.2 x 4.7 cm with more chronic appearance. Multiple cystic lesions throughout the pancreas. Largest of these is in the uncinate process measuring 1.6 x 1.0 cm. Consider follow-up with MRI pancreas for further characterization. Intravenous, Q2H PRN  Zolpidem Tartrate (AMBIEN) tab 5 mg, 5 mg, Oral, Nightly PRN  pregabalin (LYRICA) cap 50 mg, 50 mg, Oral, BID  Senna (SENOKOT) tab 17.2 mg, 17.2 mg, Oral, Nightly  docusate sodium (COLACE) cap 100 mg, 100 mg, Oral, BID  PEG 3350 ([facility] Hospitalist

## 2019-12-15 NOTE — PLAN OF CARE
Assumed care from night shift RN. Patient alert and oriented On room air, VS stable. SR-ST on monitor. Candelaria with adequate output. ANNABELLA drain in place. Pain relieved with prn pain meds  Transfer to floor.   Report called in to RN  Patient to transfer to

## 2019-12-15 NOTE — PLAN OF CARE
Pt axo went for a CT of abd and pelvis. Radiologist and Dr Frantz Yu spoke about the CT. Pt had 1 unit RBC,s HGB is 9.4 post infusion. Pt has iv abx infusing , pt up to chair x 2 hours.  Family at bedside, all questions and concerns addressed, support given, wi

## 2019-12-15 NOTE — PROGRESS NOTES
Pt resting quietly in bed. SCDs. /IS. On Regular diet. Pt reports she has IBS and declines MOM/miralax/stool softener. Up x min assist/walker. UP with brace. ANNABELLA x1. Candelaria removed upon arrival, DTV 1900. Pt reports last BM 12/12.   Pt has UTI on rocephi

## 2019-12-15 NOTE — PROGRESS NOTES
Astrid Mueller Patient Status:  Inpatient    1940 MRN [id number]   [facility] 4SW-A Attending Anatoliy Carlin MD   Hosp Day # 2 PCP Noe Rowan MD     Critical Care Progress Note      Assessment/Plan:  1.  Acute blood loss anemia or cyanosis no edema. Skin: No rashes or lesions.     Recent Labs   Lab 12/15/19  0432   RBC 2.69*   HGB 8.3*   HCT 25.8*   MCV 95.9   MCH 30.9   MCHC 32.2   RDW 15.8*   NEPRELIM 5.89   WBC 9.2   .0     Recent Labs   Lab 12/11/19  1304  12/13/19  1

## 2019-12-15 NOTE — PHYSICAL THERAPY NOTE
PHYSICAL THERAPY EVALUATION - INPATIENT     Room Number: 458/458-A  Evaluation Date: 12/15/2019  Type of Evaluation: Initial  Physician Order: PT Eval and Treat    Presenting Problem: spondylolisthesis  Reason for Therapy: Mobility Dysfunction and Rise Brakeman HISTORY      ORIF right wrist    • PARTIAL HIP REPLACEMENT Left 07/06/2018    [facility] while playing mini golf fell and broke left hip   • REMOVAL HARDWARE UPPER EXTREMITY Right 6/6/2016    Performed by Rose Ledesma MD at [place] [address] [address]  Pulse: (108 at rest, 151 peak with activity, 130 recovery)  Heart Rate Source: Monitor     BP: 156/77  BP Location: Right arm  BP Method: Automatic  Patient Position: Sitting    O2 WALK     SPO2 Ambulation on Room Air: 96            AM-PAC '6-Cl personal factors impacting therapy include LBP, L MIGUEL ÁNGEL, ORIF R wrist.  In this PT evaluation, the patient presents with the following impairments back pain, preexisting LLE pain/weakness, standing balance deficits, and tachycardia.   Functional outcome measu

## 2019-12-15 NOTE — CM/SW NOTE
PT recs HHC- referred to [facility].   Aditi Mcgraw, 12/15/19, 12:24 PM

## 2019-12-15 NOTE — HOME CARE LIAISON
Met with patient to offer home health choice. [facility] with [facility].   Patient agreeable to [facility] for PT only, declined RN at this time. [facility] brochure provided with contact information.  All questions addressed an

## 2019-12-15 NOTE — PLAN OF CARE
Assumed care at [address] [place] [address]. Alert. See flowsheet for further assessment. Received on room air. Placed 2L NC while asleep. SR/ST. Episode of hypotension after zanaflex -- bolus given. BP improved and stable for rest of the night. General diet. ANNABELLA x1.    Fo

## 2019-12-15 NOTE — PLAN OF CARE
Problem: Impaired Functional Mobility  Goal: Achieve highest/safest level of mobility/gait  Description  Interventions:  - Assess patient's functional ability and stability  - Promote increasing activity/tolerance for mobility and gait  -up to chair with

## 2019-12-16 RX ORDER — MELATONIN
325
Status: DISCONTINUED | OUTPATIENT
Start: 2019-12-16 | End: 2019-12-17

## 2019-12-16 NOTE — PROGRESS NOTES
[facility] Hospitalist Progress Note     [facility]      SUBJECTIVE:  Pain improved  Walked with PT today  No n/v    OBJECTIVE:  Temp:  [97.9 °F (36.6 °C)-98.6 °F (37 °C)] 97.9 °F (36.6 °C)  Pulse:  [70-98] 98  Resp:  [16] 16  BP: ()/(44-84) 157/74 12/13/2019  PROCEDURE:  XR FLUOROSCOPY C-ARM TIME <1 HOUR  (CPT=76000)  INDICATIONS:  Lumbar fusion  COMPARISON:  None.    TECHNIQUE:  FLUOROSCOPY IMAGES OBTAINED:  6 IMAGES FLUOROSCOPY TIME:  1 MINUTE 30 SECONDS TECHNOLOGIST TIME:  2 HOURS AND 30 MINUTES R measuring 1.2 x 1.2 cm. . SPLEEN:  Normal caliber. KIDNEYS:  No hydronephrosis. 3.3 cm dominant cyst in the left kidney. There are smaller low-attenuation foci in both kidneys too small to accurately characterize. Nonobstructive right nephrolithiasis.  A low-attenuation left paraspinal retroperitoneal fluid collection measuring 8.0 x 6.2 x 4.7 cm with more chronic appearance. Multiple cystic lesions throughout the pancreas. Largest of these is in the uncinate process measuring 1.6 x 1.0 cm.   Consider fol sulfate (PF) 4 MG/ML injection 2 mg, 2 mg, Intravenous, Q2H PRN    Or  morphINE sulfate (PF) 4 MG/ML injection 4 mg, 4 mg, Intravenous, Q2H PRN  Zolpidem Tartrate (AMBIEN) tab 5 mg, 5 mg, Oral, Nightly PRN  pregabalin (LYRICA) cap 50 mg, 50 mg, Oral, BID tomorrow    Questions/concerns were discussed with patient and/or family by bedside.     Abdullahi Barahona  Internal Medicine  [address] Hospitalist

## 2019-12-16 NOTE — PHYSICAL THERAPY NOTE
IP PT attempted, pt just returned to bed c assist from PCT. Pt reports discomfort after being up in chair. Will re-attempt as schedule permits.

## 2019-12-16 NOTE — PLAN OF CARE
Patient up in chair this am, requesting pain medication, states that pain is 5 out of 10. Jose G Anisha states that before surgery she had pain in her back, hips and legs but now she only has pain in her back.  She has bruises over arms and legs, states that she had

## 2019-12-16 NOTE — PLAN OF CARE
Pt. Admitted for L4/5 TLIF by Dr. Radu Mackey on 12-13-19, POD 3. Pain level is well controlled. Ambulates with walker and min assist, C/B brace to be worn at all times when out of bed.  Incision dressing is cdi with gauze and ioban, ANNABELLA drain to suction with mini

## 2019-12-16 NOTE — PHYSICAL THERAPY NOTE
PHYSICAL THERAPY TREATMENT NOTE - INPATIENT    Room Number: 397/343-Y     Session: 1   Number of Visits to Meet Established Goals: 5    Presenting Problem: spondylolisthesis    History related to current admission: 77 yo female with back pain and BLE L>R while playing mini golf fell and broke left hip   • REMOVAL HARDWARE UPPER EXTREMITY Right 6/6/2016    Performed by Baltazar Riley MD at [address]     • TRANSFORAMINAL LUMBAR EPIDURAL STEROID INJECTION SINGLE LEVEL Device: Rolling walker  Pattern: Shuffle  Stoop/Curb Assistance: Contact guard assist;Supervision       Skilled Therapy Provided: RN approved session. Pt presents in semi sup. Pt educated on spine precautions .   Sup>sit supervision via log roll , cues for brace; Patient education; Family education;Gait training;Stair training;Transfer training;Balance training  Rehab Potential : Good  Frequency (Obs): Daily    CURRENT GOALS   Goal #1 Patient is able to demonstrate supine - sit EOB @ level: modified independen

## 2019-12-16 NOTE — OCCUPATIONAL THERAPY NOTE
OCCUPATIONAL THERAPY QUICK EVALUATION - INPATIENT    Room Number: 696/887-X  Evaluation Date: 12/16/2019     Type of Evaluation: Quick Eval  Presenting Problem: s/p L4-5 TLIF 12/13/19    Physician Order: IP Consult to Occupational Therapy  Reason for Thera • OTHER SURGICAL HISTORY      lumpectomy x 2 right breast   • OTHER SURGICAL HISTORY      ORIF right wrist    • PARTIAL HIP REPLACEMENT Left 07/06/2018    [facility] while playing mini golf fell and broke left hip   • REMOVAL HARDWARE UPPER EX includes using toilet, bedpan or urinal? : A Little(supervision)  -   Putting on and taking off regular upper body clothing?: None  -   Taking care of personal grooming such as brushing teeth?: None  -   Eating meals?: None    AM-PAC Score:  Score: 21  Dieter reaching safely, without loss of balance, and at supervision to modified independent level; patient reports will have supervision at home from , who patient reports has Parkinson's but is fairly independent and able to assist as needed.  Patient also

## 2019-12-17 ENCOUNTER — APPOINTMENT (OUTPATIENT)
Dept: ULTRASOUND IMAGING | Facility: HOSPITAL | Age: 79
DRG: 454 | End: 2019-12-17
Attending: ORTHOPAEDIC SURGERY
Payer: MEDICARE

## 2019-12-17 VITALS
TEMPERATURE: 98 F | HEIGHT: 61 IN | RESPIRATION RATE: 16 BRPM | BODY MASS INDEX: 25.97 KG/M2 | WEIGHT: 137.56 LBS | OXYGEN SATURATION: 99 % | SYSTOLIC BLOOD PRESSURE: 134 MMHG | DIASTOLIC BLOOD PRESSURE: 73 MMHG | HEART RATE: 90 BPM

## 2019-12-17 PROCEDURE — 93971 EXTREMITY STUDY: CPT | Performed by: ORTHOPAEDIC SURGERY

## 2019-12-17 RX ORDER — HYDROCODONE BITARTRATE AND ACETAMINOPHEN 10; 325 MG/1; MG/1
1 TABLET ORAL EVERY 6 HOURS PRN
Qty: 45 TABLET | Refills: 0 | Status: SHIPPED | OUTPATIENT
Start: 2019-12-17 | End: 2019-12-17

## 2019-12-17 RX ORDER — MELATONIN
325
Qty: 30 TABLET | Refills: 0 | Status: SHIPPED | OUTPATIENT
Start: 2019-12-17 | End: 2020-02-20

## 2019-12-17 RX ORDER — NALOXONE HYDROCHLORIDE 4 MG/.1ML
4 SPRAY, METERED NASAL AS NEEDED
Qty: 1 KIT | Refills: 0 | Status: SHIPPED | OUTPATIENT
Start: 2019-12-17 | End: 2020-01-12 | Stop reason: ALTCHOICE

## 2019-12-17 RX ORDER — CEPHALEXIN 500 MG/1
500 CAPSULE ORAL 4 TIMES DAILY
Qty: 12 CAPSULE | Refills: 0 | Status: SHIPPED | OUTPATIENT
Start: 2019-12-17 | End: 2020-01-12 | Stop reason: ALTCHOICE

## 2019-12-17 RX ORDER — HYDROCODONE BITARTRATE AND ACETAMINOPHEN 10; 325 MG/1; MG/1
1 TABLET ORAL EVERY 4 HOURS PRN
Qty: 60 TABLET | Refills: 0 | Status: SHIPPED | OUTPATIENT
Start: 2019-12-17 | End: 2019-12-31

## 2019-12-17 NOTE — PROGRESS NOTES
[facility] Hospitalist Progress Note     [facility]      SUBJECTIVE:  Feeling well  No N/V  Pain controlled with PO norco    OBJECTIVE:  Temp:  [97.6 °F (36.4 °C)-98.3 °F (36.8 °C)] 97.6 °F (36.4 °C)  Pulse:  [76-92] 84  Resp:  [16] 16  BP: (133-158)/(62-8 12/13/2019  PROCEDURE:  XR FLUOROSCOPY C-ARM TIME <1 HOUR  (CPT=76000)  INDICATIONS:  Lumbar fusion  COMPARISON:  None.    TECHNIQUE:  FLUOROSCOPY IMAGES OBTAINED:  6 IMAGES FLUOROSCOPY TIME:  1 MINUTE 30 SECONDS TECHNOLOGIST TIME:  2 HOURS AND 30 MINUTES R measuring 1.2 x 1.2 cm. . SPLEEN:  Normal caliber. KIDNEYS:  No hydronephrosis. 3.3 cm dominant cyst in the left kidney. There are smaller low-attenuation foci in both kidneys too small to accurately characterize. Nonobstructive right nephrolithiasis.  A low-attenuation left paraspinal retroperitoneal fluid collection measuring 8.0 x 6.2 x 4.7 cm with more chronic appearance. Multiple cystic lesions throughout the pancreas. Largest of these is in the uncinate process measuring 1.6 x 1.0 cm.   Consider fol sulfate (PF) 4 MG/ML injection 2 mg, 2 mg, Intravenous, Q2H PRN    Or  morphINE sulfate (PF) 4 MG/ML injection 4 mg, 4 mg, Intravenous, Q2H PRN  Zolpidem Tartrate (AMBIEN) tab 5 mg, 5 mg, Oral, Nightly PRN  pregabalin (LYRICA) cap 50 mg, 50 mg, Oral, BID Questions/concerns were discussed with patient and/or family by bedside.     Lenin Castillo  Internal Medicine  [facility]ist

## 2019-12-17 NOTE — PHYSICAL THERAPY NOTE
PHYSICAL THERAPY TREATMENT NOTE - INPATIENT    Room Number: 186/728-Y     Session: 2   Number of Visits to Meet Established Goals: 5    Presenting Problem: spondylolisthesis    History related to current admission: 79 yo female with back pain and BLE L>R while playing mini golf fell and broke left hip   • REMOVAL HARDWARE UPPER EXTREMITY Right 6/6/2016    Performed by Effie Herrera MD at [address]     • TRANSFORAMINAL LUMBAR EPIDURAL STEROID INJECTION SINGLE LEVEL step length)  Stoop/Curb Assistance: Contact guard assist;Supervision       Skilled Therapy Provided: Pt presents seated in BS chair. Sit>stand mod I. Pt t/f chair>bed c supervision.  Pt demos sit>sup c supervision via modified log roll, and while dat Treatment Plan: Bed mobility; Body mechanics; Don/doff brace; Patient education; Family education;Gait training;Stair training;Transfer training;Balance training  Rehab Potential : Good  Frequency (Obs): Daily    CURRENT GOALS   Goal #1 Patient is able to demo

## 2019-12-17 NOTE — PROGRESS NOTES
PIV removed. Removed Ioban dressing per Dr. Ayesha Jacobs. Placed coverlet dressing x 2. Instructed pt she did not need dressing changes unless drainage noted. Pt watched DC video. Pt has all belongings. Put in for w/c to take pt to lobby at this time.  Pt going ho

## 2019-12-17 NOTE — PLAN OF CARE
A&O x 4. VSS. On RA. LBP well controlled with Norco PRN. Denies N/T to BLE's. Ioban dressing to lower back C/D/I. Ice therapy as tolerated. LSO brace when OOB. Up with min assist and walker to bathroom, voiding without issue.  Kimberly/Allen held this evening

## 2019-12-17 NOTE — PLAN OF CARE
Pt A & O x4, on RA. /IS. SCDs. Regular diet. UTI, pt on rocephin. Pt has IBS, declines colace, last BM 12/16. Up x min assist/walker. Swelling noted to R thigh, will inform Dr. Franky Dudley, pt states it started yesterday. Ioban dressing CDI to back.  LSO brace

## 2019-12-17 NOTE — PROGRESS NOTES
Spine Service Progress Note    24hrs/Events:   Right LE swelling. Doppler ordered to R/O blood clot    Subjective:   Patient is smiling in bed. She states she has no hip or leg pain. Mild pain in her back by the incision.  She is looking forward to [employer] -Anticoagulation: Moderate risk for DVT/PE. Defer to mechanical DVT ppx given recent history of spine surgery and risk for formation of epidural hematoma. Early mobilation. May resume chemical prophylaxis if needed 5 days post-operative.  Heparin Sq may be

## 2019-12-18 NOTE — DISCHARGE SUMMARY
[facility]  Discharge Summary    Todd Xavier Patient Status:  Inpatient    1940 MRN [id number]   [facility] 3SW-A Attending No att. providers found   [address] Day # 4 PCP Rashid Parrish MD     Date of Admission: 2019    [facility] Procedures     Case IDs Date Procedure Surgeon Location Status    [id number] 12/13/19 POSTERIOR LUMBAR INTERBODY FUSION - MIS TLIF 1 LEVEL Lindsey Cummins MD [facility] MAIN OR Comp        Afterward, pt was brought to the PACU in stable condition.   After the Succinate  MG Oral Tablet 24 Hr  Take 1 tablet (100 mg total) by mouth once daily. , Normal, Disp-90 tablet, R-3    Levocetirizine Dihydrochloride 5 MG Oral Tab  Take 5 mg by mouth every evening., Historical    diphenhydrAMINE HCl 25 MG Oral Tab  Take

## 2020-01-12 PROBLEM — S32.050A: Status: RESOLVED | Noted: 2019-10-28 | Resolved: 2020-01-12

## 2020-01-13 NOTE — TELEPHONE ENCOUNTER
Pt called stating she was prescribed Norco 10-325mg from Dr. Yuval Huerta s/p lumbar fusion on 12/13/19. Pt states she is taking Norco 3-4 times per day, has increased pain d/t brace she has to wear for 2 more weeks.  States they reached out to the evaristo

## 2020-01-14 PROBLEM — I70.0 AORTIC ATHEROSCLEROSIS (HCC): Status: ACTIVE | Noted: 2020-01-14

## 2020-01-14 PROBLEM — K80.20 CHOLELITHIASIS WITHOUT CHOLECYSTITIS: Status: ACTIVE | Noted: 2020-01-14

## 2020-01-14 PROBLEM — K86.2 PANCREATIC CYST: Status: ACTIVE | Noted: 2020-01-14

## 2020-01-14 PROBLEM — M47.816 ARTHRITIS OF FACET JOINT OF LUMBAR SPINE: Status: ACTIVE | Noted: 2020-01-14

## 2020-01-15 RX ORDER — HYDROCODONE BITARTRATE AND ACETAMINOPHEN 10; 325 MG/1; MG/1
TABLET ORAL
Qty: 120 TABLET | Refills: 0 | Status: SHIPPED | OUTPATIENT
Start: 2020-01-15 | End: 2020-02-20

## 2020-02-20 ENCOUNTER — OFFICE VISIT (OUTPATIENT)
Dept: NEUROLOGY | Facility: CLINIC | Age: 80
End: 2020-02-20
Payer: MEDICARE

## 2020-02-20 VITALS
DIASTOLIC BLOOD PRESSURE: 80 MMHG | BODY MASS INDEX: 22.84 KG/M2 | WEIGHT: 121 LBS | RESPIRATION RATE: 17 BRPM | SYSTOLIC BLOOD PRESSURE: 128 MMHG | HEIGHT: 61 IN | HEART RATE: 84 BPM

## 2020-02-20 DIAGNOSIS — M48.062 SPINAL STENOSIS OF LUMBAR REGION WITH NEUROGENIC CLAUDICATION: ICD-10-CM

## 2020-02-20 DIAGNOSIS — S32.050D COMPRESSION FRACTURE OF L5 VERTEBRA WITH ROUTINE HEALING, SUBSEQUENT ENCOUNTER: ICD-10-CM

## 2020-02-20 DIAGNOSIS — M51.26 LUMBAR HERNIATED DISC: ICD-10-CM

## 2020-02-20 DIAGNOSIS — M54.6 ACUTE LEFT-SIDED THORACIC BACK PAIN: ICD-10-CM

## 2020-02-20 DIAGNOSIS — M51.26 BULGING LUMBAR DISC: ICD-10-CM

## 2020-02-20 DIAGNOSIS — S22.080D COMPRESSION FRACTURE OF T11 VERTEBRA WITH ROUTINE HEALING: Primary | ICD-10-CM

## 2020-02-20 DIAGNOSIS — M43.10 RETROLISTHESIS OF VERTEBRAE: ICD-10-CM

## 2020-02-20 DIAGNOSIS — M48.061 LUMBAR FORAMINAL STENOSIS: ICD-10-CM

## 2020-02-20 DIAGNOSIS — M43.16 SPONDYLOLISTHESIS OF LUMBAR REGION: ICD-10-CM

## 2020-02-20 DIAGNOSIS — Z98.1 S/P LUMBAR FUSION: ICD-10-CM

## 2020-02-20 PROCEDURE — 99214 OFFICE O/P EST MOD 30 MIN: CPT | Performed by: PHYSICAL MEDICINE & REHABILITATION

## 2020-02-20 RX ORDER — HYDROCODONE BITARTRATE AND ACETAMINOPHEN 10; 325 MG/1; MG/1
TABLET ORAL
Qty: 120 TABLET | Refills: 0 | Status: SHIPPED | OUTPATIENT
Start: 2020-02-20 | End: 2020-02-20

## 2020-02-20 RX ORDER — HYDROCODONE BITARTRATE AND ACETAMINOPHEN 10; 325 MG/1; MG/1
TABLET ORAL
Qty: 120 TABLET | Refills: 0 | Status: SHIPPED | OUTPATIENT
Start: 2020-03-21 | End: 2020-04-15

## 2020-02-20 NOTE — PATIENT INSTRUCTIONS
Plan  She will continue with the LSO for now. She will see Dr. Sofy White in 2 weeks and if she is able to discontinue the use of the LSO at that time, then I will have start using a CASH brace to help the healing of the T11 compression fracture.     I spok

## 2020-02-20 NOTE — PROGRESS NOTES
Low Back Pain H & P    Chief Complaint: Patient presents with:  Low Back Pain: LOV: 11/14/19 - Pt presents for constant LBP, has had a sinus infection, is completeing antibiotics today. Had back surgery 12/13/19 w/ Dr. Barbara Obregon. Pt has been doing HEP daily.  [facility]   • Constipation    • Depression    • High blood pressure    • High cholesterol    • IBS (irritable bowel syndrome)    • Osteoarthritis    • OSTEOPOROSIS     lumbar spine   • OTHER DISEASES     lumbar spinal bandar Bilateral 10/29/2019    Performed by Ada Gutiérrez DO at [facility] MAIN OR       Family History   Family History   Problem Relation Age of Onset   • Cancer Mother         melanoma   • Heart Disease Mother    • Hypertension Mother    • Musculo-skelatal Disorder file        Forced sexual activity: Not on file    Other Topics      Concerns:         Service: Not Asked        Blood Transfusions: Not Asked        Caffeine Concern: No        Occupational Exposure: Not Asked        Hobby Hazards: Not Asked Imaging:  I reviewed with the patient her CT of the abdomen and pelvis from 12/14/19. Assessment  1. Compression fracture of T11 vertebra with routine healing    2. Acute left-sided thoracic back pain    3.  Compression fracture of L5 vertebra with rou

## 2020-04-15 RX ORDER — HYDROCODONE BITARTRATE AND ACETAMINOPHEN 10; 325 MG/1; MG/1
TABLET ORAL
Qty: 120 TABLET | Refills: 0 | Status: SHIPPED | OUTPATIENT
Start: 2020-04-21 | End: 2020-05-19

## 2020-04-15 NOTE — TELEPHONE ENCOUNTER
Medication request: Norco 10-325mg Oral Tab, #120, no refills  Take 1 tabl by mouth every 6 to 8 hours PRN pain    [place]: 3/22/20    LOV: 2/20/20  NOV: 4/22/20 televisit

## 2020-04-22 ENCOUNTER — TELEPHONE (OUTPATIENT)
Dept: NEUROLOGY | Facility: CLINIC | Age: 80
End: 2020-04-22

## 2020-04-22 ENCOUNTER — VIRTUAL PHONE E/M (OUTPATIENT)
Dept: NEUROLOGY | Facility: CLINIC | Age: 80
End: 2020-04-22
Payer: MEDICARE

## 2020-04-22 DIAGNOSIS — M54.17 LUMBOSACRAL RADICULOPATHY AT L5: Primary | ICD-10-CM

## 2020-04-22 DIAGNOSIS — S32.050D COMPRESSION FRACTURE OF L5 VERTEBRA WITH ROUTINE HEALING, SUBSEQUENT ENCOUNTER: ICD-10-CM

## 2020-04-22 DIAGNOSIS — M48.061 LUMBAR FORAMINAL STENOSIS: ICD-10-CM

## 2020-04-22 DIAGNOSIS — M48.062 SPINAL STENOSIS OF LUMBAR REGION WITH NEUROGENIC CLAUDICATION: ICD-10-CM

## 2020-04-22 DIAGNOSIS — M43.16 SPONDYLOLISTHESIS AT L4-L5 LEVEL: ICD-10-CM

## 2020-04-22 DIAGNOSIS — M43.16 SPONDYLOLISTHESIS OF LUMBAR REGION: ICD-10-CM

## 2020-04-22 DIAGNOSIS — Z98.1 S/P LUMBAR FUSION: ICD-10-CM

## 2020-04-22 DIAGNOSIS — S22.080D COMPRESSION FRACTURE OF T11 VERTEBRA WITH ROUTINE HEALING: ICD-10-CM

## 2020-04-22 DIAGNOSIS — S22.040D COMPRESSION FRACTURE OF T4 VERTEBRA WITH ROUTINE HEALING: ICD-10-CM

## 2020-04-22 DIAGNOSIS — S32.050G COMPRESSION FRACTURE OF L5 VERTEBRA WITH DELAYED HEALING: ICD-10-CM

## 2020-04-22 DIAGNOSIS — M51.26 LUMBAR HERNIATED DISC: ICD-10-CM

## 2020-04-22 DIAGNOSIS — M51.26 BULGING LUMBAR DISC: ICD-10-CM

## 2020-04-22 DIAGNOSIS — M48.04 THORACIC STENOSIS: ICD-10-CM

## 2020-04-22 DIAGNOSIS — Z96.642 STATUS POST LEFT HIP REPLACEMENT: ICD-10-CM

## 2020-04-22 DIAGNOSIS — M43.10 RETROLISTHESIS OF VERTEBRAE: ICD-10-CM

## 2020-04-22 PROCEDURE — 99443 PHONE E/M BY PHYS 21-30 MIN: CPT | Performed by: PHYSICAL MEDICINE & REHABILITATION

## 2020-04-22 NOTE — PATIENT INSTRUCTIONS
Plan:  She will get a new MRI of the thoracic spine so that I can see if the compression fractures are healed and the exact degree of disc bulging and stenosis are present, since it appears that her pain is now more disc related as opposed to being due to

## 2020-04-22 NOTE — PROGRESS NOTES
HPI:    Patient ID: Evonne Hernández is a [de-identified]year old female.     Evonne Hernández verbally consents to a Virtual/Telephone Check-In visit on 04/22/20    Patient understands and accepts financial responsibility for any deductible, co-insurance and/or co-pays of the day, standing, and going from sitting to standing. She is not able to stay in position for a long time. The pain is not affected by going up or down the stairs. She is taking more 4 than 3 of the Norco per day for the pain.     Review of Systems: a DIARRHEA  [facility]*    DIARRHEA   PHYSICAL EXAM:   PE:  Speech is clear. A&O x 3  Thought processes intact. Does not sound to be in acute distress.   Respirations are unlabored  Does not sound depressed or anxious    I reviewed with the patient Imaging & Referrals:  MRI SPINE THORACIC (BNQ=[id number])     Sharad Olvera M.D.  #[id number]

## 2020-04-22 NOTE — TELEPHONE ENCOUNTER
Medicare Online for insurance coverage of MRI T-spine wo cpt code 80118. Insurance was verified and procedure is a covered benefit and does not require authorization. Will call Pt. To inform. Rg informed authorization is not required.  Transferred call t

## 2020-04-23 ENCOUNTER — TELEPHONE (OUTPATIENT)
Dept: NEUROLOGY | Facility: CLINIC | Age: 80
End: 2020-04-23

## 2020-05-04 PROBLEM — M51.24 HERNIATED THORACIC DISC WITHOUT MYELOPATHY: Status: ACTIVE | Noted: 2020-05-04

## 2020-05-04 PROBLEM — M51.9 THORACIC DISC DISEASE: Status: ACTIVE | Noted: 2020-05-04

## 2020-05-07 ENCOUNTER — VIRTUAL PHONE E/M (OUTPATIENT)
Dept: NEUROLOGY | Facility: CLINIC | Age: 80
End: 2020-05-07
Payer: MEDICARE

## 2020-05-07 DIAGNOSIS — M48.061 LUMBAR FORAMINAL STENOSIS: ICD-10-CM

## 2020-05-07 DIAGNOSIS — M48.04 THORACIC STENOSIS: ICD-10-CM

## 2020-05-07 DIAGNOSIS — S22.040D COMPRESSION FRACTURE OF T4 VERTEBRA WITH ROUTINE HEALING: ICD-10-CM

## 2020-05-07 DIAGNOSIS — S22.080D COMPRESSION FRACTURE OF T11 VERTEBRA WITH ROUTINE HEALING: ICD-10-CM

## 2020-05-07 DIAGNOSIS — M51.26 LUMBAR HERNIATED DISC: ICD-10-CM

## 2020-05-07 DIAGNOSIS — M43.10 RETROLISTHESIS OF VERTEBRAE: ICD-10-CM

## 2020-05-07 DIAGNOSIS — M43.16 SPONDYLOLISTHESIS OF LUMBAR REGION: ICD-10-CM

## 2020-05-07 DIAGNOSIS — M48.062 SPINAL STENOSIS OF LUMBAR REGION WITH NEUROGENIC CLAUDICATION: ICD-10-CM

## 2020-05-07 DIAGNOSIS — S32.050D COMPRESSION FRACTURE OF L5 VERTEBRA WITH ROUTINE HEALING, SUBSEQUENT ENCOUNTER: ICD-10-CM

## 2020-05-07 DIAGNOSIS — M51.24 HERNIATED THORACIC DISC WITHOUT MYELOPATHY: ICD-10-CM

## 2020-05-07 DIAGNOSIS — M54.6 ACUTE LEFT-SIDED THORACIC BACK PAIN: Primary | ICD-10-CM

## 2020-05-07 DIAGNOSIS — M51.26 BULGING LUMBAR DISC: ICD-10-CM

## 2020-05-07 DIAGNOSIS — M51.9 THORACIC DISC DISEASE: ICD-10-CM

## 2020-05-07 DIAGNOSIS — M43.16 SPONDYLOLISTHESIS AT L4-L5 LEVEL: ICD-10-CM

## 2020-05-07 PROCEDURE — 99443 PHONE E/M BY PHYS 21-30 MIN: CPT | Performed by: PHYSICAL MEDICINE & REHABILITATION

## 2020-05-07 NOTE — PROGRESS NOTES
HPI:    Patient ID: Marti Stinson is a [de-identified]year old female.     Marti Stinson verbally consents to a Virtual/Telephone Check-In visit on 05/07/20    Patient understands and accepts financial responsibility for any deductible, co-insurance and/or co-pays (two) times daily as needed for Sleep or Anxiety. 60 tablet 2   • HYDROcodone-acetaminophen (NORCO)  MG Oral Tab Take 1 tab every 6 to 8 hours as needed for pain.  120 tablet 0   • cefdinir 300 MG Oral Cap Take 1 capsule (300 mg total) by mouth every (primary encounter diagnosis)  Compression fracture of t11 vertebra with routine healing  Compression fracture of t4 vertebra with routine healing with severe compression  T4-5 mod diffuse, t6-7 mod bilobed, t8-9 left mild, t9-10mild diffuse, t10-11 mod di

## 2020-05-07 NOTE — PATIENT INSTRUCTIONS
Plan:  Since most of her pain is in the upper back which appears to be around the T4 level and in her low back which could be due to the spondylolisthesis at L4-5 or the bulging disc at L5-S1, I will have her get into PT for stabilization of the thoracic a

## 2020-05-13 ENCOUNTER — TELEPHONE (OUTPATIENT)
Dept: PHYSICAL THERAPY | Facility: HOSPITAL | Age: 80
End: 2020-05-13

## 2020-05-13 NOTE — TELEPHONE ENCOUNTER
After chart review, it appears that the patient is hesitant to start PT. Left message for patient to see if she had any questions that would make her more comfortable starting PT. Asked to call back if she has questions or would like to schedule.   Told h

## 2020-05-19 RX ORDER — HYDROCODONE BITARTRATE AND ACETAMINOPHEN 10; 325 MG/1; MG/1
TABLET ORAL
Qty: 120 TABLET | Refills: 0 | Status: SHIPPED | OUTPATIENT
Start: 2020-05-21 | End: 2020-06-22

## 2020-05-19 NOTE — TELEPHONE ENCOUNTER
Medication request: Norco 10-325mg 1 TAB Q6-8H PRN    LOV: 05/07/20  NOV: none    [place] 04/21/20 #120 r-0

## 2020-05-20 ENCOUNTER — TELEPHONE (OUTPATIENT)
Dept: NEUROLOGY | Facility: CLINIC | Age: 80
End: 2020-05-20

## 2020-05-26 ENCOUNTER — OFFICE VISIT (OUTPATIENT)
Dept: PHYSICAL THERAPY | Facility: HOSPITAL | Age: 80
End: 2020-05-26
Attending: PHYSICAL MEDICINE & REHABILITATION
Payer: MEDICARE

## 2020-05-26 DIAGNOSIS — M48.061 LUMBAR FORAMINAL STENOSIS: ICD-10-CM

## 2020-05-26 DIAGNOSIS — M43.16 SPONDYLOLISTHESIS AT L4-L5 LEVEL: ICD-10-CM

## 2020-05-26 DIAGNOSIS — S22.080D COMPRESSION FRACTURE OF T11 VERTEBRA WITH ROUTINE HEALING: ICD-10-CM

## 2020-05-26 DIAGNOSIS — M51.26 BULGING LUMBAR DISC: ICD-10-CM

## 2020-05-26 DIAGNOSIS — S32.050D COMPRESSION FRACTURE OF L5 VERTEBRA WITH ROUTINE HEALING, SUBSEQUENT ENCOUNTER: ICD-10-CM

## 2020-05-26 DIAGNOSIS — M51.24 HERNIATED THORACIC DISC WITHOUT MYELOPATHY: ICD-10-CM

## 2020-05-26 DIAGNOSIS — M54.6 ACUTE LEFT-SIDED THORACIC BACK PAIN: ICD-10-CM

## 2020-05-26 DIAGNOSIS — M48.04 THORACIC STENOSIS: ICD-10-CM

## 2020-05-26 DIAGNOSIS — M51.26 LUMBAR HERNIATED DISC: ICD-10-CM

## 2020-05-26 DIAGNOSIS — M48.062 SPINAL STENOSIS OF LUMBAR REGION WITH NEUROGENIC CLAUDICATION: ICD-10-CM

## 2020-05-26 DIAGNOSIS — M51.9 THORACIC DISC DISEASE: ICD-10-CM

## 2020-05-26 DIAGNOSIS — M43.16 SPONDYLOLISTHESIS OF LUMBAR REGION: ICD-10-CM

## 2020-05-26 DIAGNOSIS — M43.10 RETROLISTHESIS OF VERTEBRAE: ICD-10-CM

## 2020-05-26 DIAGNOSIS — S22.040D COMPRESSION FRACTURE OF T4 VERTEBRA WITH ROUTINE HEALING: ICD-10-CM

## 2020-05-26 PROCEDURE — 97110 THERAPEUTIC EXERCISES: CPT

## 2020-05-26 PROCEDURE — 97162 PT EVAL MOD COMPLEX 30 MIN: CPT

## 2020-05-26 NOTE — PROGRESS NOTES
LUMBAR SPINE EVALUATION:   Ary Astorga    2/25/1940  Referring Physician:  Matilde Wheatley  Diagnosis: Acute left-sided thoracic back pain (M54.6)  Compression fracture of T11 vertebra with routine healing (S22.080D)  Compression fracture of T4 verteb • OTHER DISEASES     thoracic DJD   • OTHER DISEASES     fibrocystic breast disease   • OTHER DISEASES     plantar fasciitis   • OTHER DISEASES     epicardial fat pad chest   • OTHER DISEASES     uterine fibroids/hyperplasia   • Rheumatic fever in pediat since her surgery. She notes that the pain from her surgery is now gone. Pt notes that she has been receiving back injections with Dr. Beatrice Abbasi for her lumbar issues prior to surgery.    History of current condition: Insidious onset of mid thoracic pain afte bathroom 1x and has pain at that time, Sweats - no  Incontinence: stress wears pad  Saddle Anesthesia: no  OB-Gyn:  NVD2  GI: IBS,   Stress: mod  Work:  Retired for age,   Living environment: [facility]   Stairs: 3 steps in, also ramsay Lumbopelvic 5/26/2020   Control  Poor due to decreased activation and mobility          Functional Performance:    5/26/2020     Motor Control   Double leg squat R fair   Double leg squat L fair   Single leg balance R 3   Single leg balance L 3       ROM canal or foraminal stenosis. T4-T5: Mild to moderate central canal stenosis related to the retropulsed T4 fracture. There is a  global disc bulge. No significant foraminal narrowing. T5-T6: No focal disc herniation, central canal or foraminal stenosis. 03.12.2020     CT SPINE THORACIC (CPT=72128)  CT THORACIC SPINE WITHOUT CONTRAST. GEPA:3/52/9441 12:00 PM. Due to technical factors, this case is being presented for interpretation  on 3/16/2020.   HISTORY:Wedge compression fracture of unspecified thoracic compression  deformity and retropulsion of bone as described above. Compared to the neural foramen above and  below, there is at least mild bilateral foraminal stenosis at T10-T11.  There is also central canal  stenosis and probably some degree of ventral c be able to stand to make a light meal in the kitchen without increased symptoms. 4. Pt will be able to walk for 10 min for community ambulation without increased symptoms. 5. Pt will be able to walk for 20 min for exercise without increased symptoms.   6.

## 2020-05-29 ENCOUNTER — OFFICE VISIT (OUTPATIENT)
Dept: PHYSICAL THERAPY | Facility: HOSPITAL | Age: 80
End: 2020-05-29
Attending: PHYSICAL MEDICINE & REHABILITATION
Payer: MEDICARE

## 2020-05-29 PROCEDURE — 97110 THERAPEUTIC EXERCISES: CPT

## 2020-05-29 NOTE — PROGRESS NOTES
Evonne Mcgowan    2/25/1940  Referring Physician:  Megan Noble  Diagnosis: Acute left-sided thoracic back pain (M54.6)  Compression fracture of T11 vertebra with routine healing (S22.080D)  Compression fracture of T4 vertebra with routine healing (S22 Thoracic decompresion  x X    T decompress arm press at sides   X 10 x 2    T decompress w sternal lift H  X 10x2    Head lift w exhale and hand hold H X X10x    Elbow press back H  X 10x   Standing Heel lift H X x    Hip abd H X x    Fwd lean on counter T treatment: 40 min      Total Treatment Time: 45 min       _______________________________________________________________________________    Objective Initial Evaluation Data 5/26/2020:    FOTO: not assessed at this time due to COVID    Posture:  R trunk s Severe compression fractures of T4 and T11 are redemonstrated  with vertebral plana deformity. The T4 fracture appears chronic. There is mild hyperintense edema  within the posterior aspect of the T11 fracture.  The anterior aspect of the T11 fracture is  h bilateral  foraminal stenosis at both levels. T12-L1: No focal disc herniation, central canal or foraminal stenosis. OTHER FINDINGS: None  =====  IMPRESSION:  1. Severe compression fractures of T4 and T11 with vertebral plana deformities.  The T4 fracture retropulsion of bone measuring 6.8 mm maximum at T4 in the midline and  retropulsion of bone measuring a maximum of 8.11 mm in the midline at T11. At both sites, there is  at least 80% anterior compression and probably about 30% posterior compression.   The there can be idiopathic anterior spinal cord herniation,  occasionally precipitated by a spur or osseous fragment. 3. There is suggestion of left-sided pleural effusion or pleural thickening incompletely evaluated  on this study.  Correlate clinically and

## 2020-06-01 ENCOUNTER — OFFICE VISIT (OUTPATIENT)
Dept: PHYSICAL THERAPY | Facility: HOSPITAL | Age: 80
End: 2020-06-01
Attending: PHYSICAL MEDICINE & REHABILITATION
Payer: MEDICARE

## 2020-06-01 PROCEDURE — 97110 THERAPEUTIC EXERCISES: CPT

## 2020-06-01 NOTE — PROGRESS NOTES
Barbie Sanford    2/25/1940  Referring Physician:  Cristine Gardner  Diagnosis: Acute left-sided thoracic back pain (M54.6)  Compression fracture of T11 vertebra with routine healing (S22.080D)  Compression fracture of T4 vertebra with routine healing (S22 X 5x     Pelvic tilt    X 10x    TA set w p tilt    X 10x    Thoracic decompresion  x X X    T decompress arm press at sides   X 10 x 2 X 10x    T decompress w sternal lift H  X 10x2 X 10x    Head lift w exhale and hand hold H X X10x X 10x    Elbow press b therapeutic pain neuroscience education, patient education, modalities as needed.     Charges: TE4    Total Timed treatment: 60 min      Total Treatment Time: 60 min       _______________________________________________________________________________    Ob sagittal T1, T2, STIR, axial T2 sequences through the thoracic spine  FINDINGS:  OSSEOUS STRUCTURES AND CENTRAL CANAL: Severe compression fractures of T4 and T11 are redemonstrated  with vertebral plana deformity. The T4 fracture appears chronic.  There is stenosis related to the  retropulsed T11 fracture. There is mild bilateral facet arthropathy. There is mild bilateral  foraminal stenosis at both levels. T12-L1: No focal disc herniation, central canal or foraminal stenosis.   OTHER FINDINGS: None  ===== spondylolisthesis C7-T1 and T1-T2, as well as compression deformities of indeterminant age  involving T4 and T11 with retropulsion of bone measuring 6.8 mm maximum at T4 in the midline and  retropulsion of bone measuring a maximum of 8.11 mm in the midline compression fracture. Correlate with MRI of the thoracic spine as well as  clinically, as this is in a location where there can be idiopathic anterior spinal cord herniation,  occasionally precipitated by a spur or osseous fragment.   3. There is suggestion

## 2020-06-04 ENCOUNTER — OFFICE VISIT (OUTPATIENT)
Dept: PHYSICAL THERAPY | Facility: HOSPITAL | Age: 80
End: 2020-06-04
Attending: PHYSICAL MEDICINE & REHABILITATION
Payer: MEDICARE

## 2020-06-04 PROCEDURE — 97140 MANUAL THERAPY 1/> REGIONS: CPT

## 2020-06-04 PROCEDURE — 97110 THERAPEUTIC EXERCISES: CPT

## 2020-06-04 PROCEDURE — 97014 ELECTRIC STIMULATION THERAPY: CPT

## 2020-06-04 NOTE — PROGRESS NOTES
Aliyah Morales    2/25/1940  Referring Physician:  Will Rajput  Diagnosis: Acute left-sided thoracic back pain (M54.6)  Compression fracture of T11 vertebra with routine healing (S22.080D)  Compression fracture of T4 vertebra with routine healing (S22 set w p tilt   X 10x x    Thoracic decompresion  X X     T decompress arm press at sides  X 10 x 2 X 10x     T decompress w sternal lift H X 10x2 X 10x x    Head lift w exhale and hand hold H X10x X 10x     Elbow press back H X 10x X 10x     B diagonal sca stimulation followed by man tx and ther ex.  Continue PT per original plan for therapeutic exercises, posture retraining, therapeutic activities, manual treatment, neuromuscular reeducation, therapeutic pain neuroscience education, patient education, modali THR   Hams L wnl   Hams R wnl     Imaging:     DATE OF SERVICE: 05.01.2020     CLINICAL HISTORY: Vertebral compression fractures, increased pain   COMPARISON: CT 3/12/2020   TECHNIQUE: Coronal T2, sagittal T1, T2, STIR, axial T2 sequences through the thora protrusion. No central canal or foraminal stenosis. T9-T10: No focal disc herniation, central canal or foraminal stenosis. T10-T11 and T11-T12: Mild disc bulges. There is moderate central canal stenosis related to the  retropulsed T11 fracture.  There is Sagittal and coronal reformatted images were obtained from axial data.   FINDINGS:  Allignment:  There is straightening of the mid thoracic kyphosis from T4 through T10, grade 1  anterior spondylolisthesis C7-T1 and T1-T2, as well as compression deformities and  neural foraminal stenosis bilaterally at T4-T5 and T10-T11.  2. The compression fracture of T4 demonstrates a superimposed osseous projection centrally along  inferior T4 as part of the compression fracture.  Correlate with MRI of the thoracic spine as

## 2020-06-05 ENCOUNTER — APPOINTMENT (OUTPATIENT)
Dept: PHYSICAL THERAPY | Facility: HOSPITAL | Age: 80
End: 2020-06-05
Attending: PHYSICAL MEDICINE & REHABILITATION
Payer: MEDICARE

## 2020-06-08 ENCOUNTER — OFFICE VISIT (OUTPATIENT)
Dept: PHYSICAL THERAPY | Facility: HOSPITAL | Age: 80
End: 2020-06-08
Attending: PHYSICAL MEDICINE & REHABILITATION
Payer: MEDICARE

## 2020-06-08 PROCEDURE — 97110 THERAPEUTIC EXERCISES: CPT

## 2020-06-08 PROCEDURE — 97112 NEUROMUSCULAR REEDUCATION: CPT

## 2020-06-08 NOTE — PROGRESS NOTES
[facility]    2/25/1940  Referring Physician:  Ravi Banuelos  Diagnosis: Acute left-sided thoracic back pain (M54.6)  Compression fracture of T11 vertebra with routine healing (S22.080D)  Compression fracture of T4 vertebra with routine healing (S22 Pelvic tilt  X 10x x     TA set w p tilt  X 10x x x    Thoracic decompresion  X  x    T decompress arm press at sides  X 10x  x    T decompress w sternal lift H X 10x x x    Head lift w exhale and hand hold H X 10x  x    Elbow press back H X 10x  x    B ambulation without increased symptoms. 5. Pt will be able to walk for 20 min for exercise without increased symptoms. 6. Pt will be able to stand to brush teeth without increased symptoms.       PLAN OF CARE:      Assess response to LE strengthening for h 4+   L4  ankle DF R 5-   Ankle DF L 4   L5 – EHL R 5   EHL L 5   S1 –ankle PF R NT   Ankle PF L NT   Hams R 5   Hams L 5   *pain limiting    LE flexibility 5/26/2020   Piriformis L min   Piriformis R NT fully due to THR   Hams L wnl   Hams R wnl

## 2020-06-11 ENCOUNTER — OFFICE VISIT (OUTPATIENT)
Dept: PHYSICAL THERAPY | Facility: HOSPITAL | Age: 80
End: 2020-06-11
Attending: PHYSICAL MEDICINE & REHABILITATION
Payer: MEDICARE

## 2020-06-11 PROCEDURE — 97110 THERAPEUTIC EXERCISES: CPT

## 2020-06-11 NOTE — PROGRESS NOTES
Darrel Oas    2/25/1940  Referring Physician:  Caden Herzog  Diagnosis: Acute left-sided thoracic back pain (M54.6)  Compression fracture of T11 vertebra with routine healing (S22.080D)  Compression fracture of T4 vertebra with routine healing (S22 6/8/2020 6/11/2020    NuStep Seat 9, arms 9, resist 3,  X 8 min, 3 intervals X 9 min, 4 intervals X 10 min, 4 intervals   Supine TA exhale H x      Pelvic tilt  x      TA set w p tilt  x x x    Thoracic decompresion   x x    T decompress arm press at side beginning level of HEP for stretching, posture and strengthening. 2. Pt will demonstrate good body mechanics awareness for prevention of reinjury. 3. Pt will be able to stand to make a light meal in the kitchen without increased symptoms.   4. Pt will be Max    R SB mod   L SB mod   R Rotation Mod-max   L Rotation mod   * pain limiting    ROM Hip 5/26/2020   Flex R 118   Flex L 120   IR R 19   IR L 23   ER R 55   ER L 47   *pain limiting     MMT 5/5 5/26/2020   L2- hip flex R 4-   Hip flex L 4-   L3- knee

## 2020-06-15 ENCOUNTER — OFFICE VISIT (OUTPATIENT)
Dept: PHYSICAL THERAPY | Facility: HOSPITAL | Age: 80
End: 2020-06-15
Attending: PHYSICAL MEDICINE & REHABILITATION
Payer: MEDICARE

## 2020-06-15 PROCEDURE — 97110 THERAPEUTIC EXERCISES: CPT

## 2020-06-15 NOTE — PROGRESS NOTES
Aliyah Morales    2/25/1940  Referring Physician:  Will Rajput  Diagnosis: Acute left-sided thoracic back pain (M54.6)  Compression fracture of T11 vertebra with routine healing (S22.080D)  Compression fracture of T4 vertebra with routine healing (S22 5/8 6/8 7/8   Position Exercise HEP 6/8/2020  6/11/2020  6/15/2020    NuStep Seat 9, arms 9, resist 3,  X 9 min, 4 intervals X 10 min, 4 intervals    Supine TA exhale H       Pelvic tilt        TA set w p tilt  x x     Thoracic decompresion  x x     T deco understanding. Pt demonstrates improved TA control with use of counting aloud to gain exhale. Pt remains cooperative and motivated, performing HEP consistently.      Physical Therapy Goals: From 5/26/2020 to 8/24/2020  - Created with patient input during umbilicus 1      Lumbopelvic 5/26/2020   Control  Poor due to decreased activation and mobility          Functional Performance:    5/26/2020     Motor Control   Double leg squat R fair   Double leg squat L fair   Single leg balance R 3   Single leg balanc

## 2020-06-18 ENCOUNTER — OFFICE VISIT (OUTPATIENT)
Dept: PHYSICAL THERAPY | Facility: HOSPITAL | Age: 80
End: 2020-06-18
Attending: PHYSICAL MEDICINE & REHABILITATION
Payer: MEDICARE

## 2020-06-18 PROCEDURE — 97110 THERAPEUTIC EXERCISES: CPT

## 2020-06-18 NOTE — PROGRESS NOTES
Progress Summary    Pt has attended 8, cancelled 0, and no shown 0 visits in Physical Therapy.       Evonne Hernández    2/25/1940  Referring Physician:  Heather Canchola  Diagnosis: Acute left-sided thoracic back pain (M54.6)  Compression fracture of T11 verte bothering her more right now.      Visit count 8/24/2020 1/8 2/8 3/8 4/8 5/8 6/8 7/8 8/16   Date 5/26/2020 5/29/2020  6/1/2020  6/4/2020  6/8/2020  6/11/2020  6/15/2020  6/18/2020    Thoracic           Pain Range 4-5 to 8/10 4-5 to 7-8/10 3-4 to 7/10 3-4 to 10x, 5 sec count aloud X    Knee extn  X 3# 5x2, 5# 5x2, 9# 5x2       TB hip abd H X Blue      TB hip abd w TA set w exhale control   X 10, 5 sec, count alound x blue TB    Hip add on ball w TA set w exhale control  H  X 10x5 sec count alound x   Standing include: Continue PT per original plan for therapeutic exercises, posture retraining, therapeutic activities, manual treatment, neuromuscular reeducation, therapeutic pain neuroscience education, patient education, modalities as needed.         Patient/Fami 6/18/2020      Motor Control Motor control   Double leg squat R fair good   Double leg squat L fair good   Single leg balance R 3 6   Single leg balance L 3 3       ROM Lumbar 5/26/2020 6/18/2020    Flexion Max  mod   Extension Max  Mod-max   R SB mod wnl

## 2020-06-22 ENCOUNTER — TELEPHONE (OUTPATIENT)
Dept: NEUROLOGY | Facility: CLINIC | Age: 80
End: 2020-06-22

## 2020-06-22 ENCOUNTER — OFFICE VISIT (OUTPATIENT)
Dept: PHYSICAL THERAPY | Facility: HOSPITAL | Age: 80
End: 2020-06-22
Attending: PHYSICAL MEDICINE & REHABILITATION
Payer: MEDICARE

## 2020-06-22 PROCEDURE — 97110 THERAPEUTIC EXERCISES: CPT

## 2020-06-22 RX ORDER — HYDROCODONE BITARTRATE AND ACETAMINOPHEN 10; 325 MG/1; MG/1
TABLET ORAL
Qty: 120 TABLET | Refills: 0 | Status: SHIPPED | OUTPATIENT
Start: 2020-06-22 | End: 2020-07-21

## 2020-06-22 NOTE — PROGRESS NOTES
Aliyah Morales    2/25/1940  Referring Physician:  Will Rajput  Diagnosis: Acute left-sided thoracic back pain (M54.6)  Compression fracture of T11 vertebra with routine healing (S22.080D)  Compression fracture of T4 vertebra with routine healing (S22 OBJECTIVE:     Treatment performed this date:    Therapeutic Exercise:  Visit #   7/8 8/16 9/16   Position Exercise HEP 6/15/2020  6/18/2020  6/22/2020    NuStep Seat 9, arms 9, resist 3,   X 10 min, 4 intervals X 10 min, 0 intervals   Supine TA exh with decreasing thoracic pain. She continues to have LBP as she increases her activity and exercise level. Pt required verbal and tactile cues for hip pelvic disassociation for lumbopelvic control. She displays improved B hip abd control.   Progress brid labored    Sensation: Intact to light touch of the LE dermatomes while in sitting.      Abdominals 5/26/2020   Tone  fair     Diastasis in fingers 5/26/2020   12 cm above umbilicus 2-3   6 cm above umbilicus 3   At umbilicus 3   6 cm below umbilicus 2   12

## 2020-06-23 NOTE — TELEPHONE ENCOUNTER
Norco refilled.
Patient left message on office voice mail requesting refill on hydrocodone 10325 # 120.
Patient/Caregiver provided printed discharge information.

## 2020-06-25 ENCOUNTER — OFFICE VISIT (OUTPATIENT)
Dept: PHYSICAL THERAPY | Facility: HOSPITAL | Age: 80
End: 2020-06-25
Attending: PHYSICAL MEDICINE & REHABILITATION
Payer: MEDICARE

## 2020-06-25 PROCEDURE — 97110 THERAPEUTIC EXERCISES: CPT

## 2020-06-25 NOTE — PROGRESS NOTES
Ava Foster    2/25/1940  Referring Physician:  José Owen  Diagnosis: Acute left-sided thoracic back pain (M54.6)  Compression fracture of T11 vertebra with routine healing (S22.080D)  Compression fracture of T4 vertebra with routine healing (S22 date:    Therapeutic Exercise:  Visit #   8/16 9/16 10/16   Position Exercise HEP 6/18/2020 6/22/2020 6/25/2020    NuStep Seat 9, arms 9, resist 3,  X 10 min, 4 intervals X 10 min, 0 intervals X 10 min,    Supine TA exhale H       Pelvic tilt    X    TA first thing in the morning and in the evening. Pt did well to progress supine lumbopelvic control to include single leg march. Pt remains cooperative and motivated, performing HEP consistently.      Physical Therapy Goals: From 5/26/2020 to 8/24/2020, ass fingers 5/26/2020   12 cm above umbilicus 2-3   6 cm above umbilicus 3   At umbilicus 3   6 cm below umbilicus 2   12 cm above umbilicus 1      Lumbopelvic 5/26/2020   Control  Poor due to decreased activation and mobility          Functional Performance:

## 2020-07-06 ENCOUNTER — VIRTUAL PHONE E/M (OUTPATIENT)
Dept: NEUROLOGY | Facility: CLINIC | Age: 80
End: 2020-07-06

## 2020-07-06 DIAGNOSIS — M48.061 LUMBAR FORAMINAL STENOSIS: ICD-10-CM

## 2020-07-06 DIAGNOSIS — M51.9 THORACIC DISC DISEASE: ICD-10-CM

## 2020-07-06 DIAGNOSIS — M48.04 THORACIC STENOSIS: ICD-10-CM

## 2020-07-06 DIAGNOSIS — M43.16 SPONDYLOLISTHESIS AT L4-L5 LEVEL: ICD-10-CM

## 2020-07-06 DIAGNOSIS — S22.080D COMPRESSION FRACTURE OF T11 VERTEBRA WITH ROUTINE HEALING: ICD-10-CM

## 2020-07-06 DIAGNOSIS — S32.050G COMPRESSION FRACTURE OF L5 VERTEBRA WITH DELAYED HEALING: ICD-10-CM

## 2020-07-06 DIAGNOSIS — M43.16 SPONDYLOLISTHESIS OF LUMBAR REGION: ICD-10-CM

## 2020-07-06 DIAGNOSIS — M48.062 SPINAL STENOSIS OF LUMBAR REGION WITH NEUROGENIC CLAUDICATION: ICD-10-CM

## 2020-07-06 DIAGNOSIS — M51.26 BULGING LUMBAR DISC: ICD-10-CM

## 2020-07-06 DIAGNOSIS — M51.24 HERNIATED THORACIC DISC WITHOUT MYELOPATHY: ICD-10-CM

## 2020-07-06 DIAGNOSIS — M80.00XK AGE-RELATED OSTEOPOROSIS WITH CURRENT PATHOLOGICAL FRACTURE WITH NONUNION: ICD-10-CM

## 2020-07-06 DIAGNOSIS — M51.26 LUMBAR HERNIATED DISC: ICD-10-CM

## 2020-07-06 DIAGNOSIS — M54.17 LUMBOSACRAL RADICULOPATHY AT L5: Primary | ICD-10-CM

## 2020-07-06 DIAGNOSIS — Z98.1 S/P LUMBAR FUSION: ICD-10-CM

## 2020-07-06 DIAGNOSIS — S22.040D COMPRESSION FRACTURE OF T4 VERTEBRA WITH ROUTINE HEALING: ICD-10-CM

## 2020-07-06 DIAGNOSIS — M43.10 RETROLISTHESIS OF VERTEBRAE: ICD-10-CM

## 2020-07-06 PROCEDURE — 99442 PHONE E/M BY PHYS 11-20 MIN: CPT | Performed by: PHYSICAL MEDICINE & REHABILITATION

## 2020-07-06 NOTE — PROGRESS NOTES
HPI:    Patient ID: Agustina Bell is a [de-identified]year old female.     Agustina Bell verbally consents to a Virtual/Telephone Check-In visit on 07/06/20    Patient understands and accepts financial responsibility for any deductible, co-insurance and/or co-pays Succinate ER (PRISTIQ) 100 MG Oral Tablet 24 Hr Take 1 tablet (100 mg total) by mouth daily. 90 tablet 3   • Metoprolol Succinate  MG Oral Tablet 24 Hr Take 1 tablet (100 mg total) by mouth once daily.  90 tablet 3   • ALPRAZolam 0.25 MG Oral Tab Take compression  Compression fracture of t11 vertebra with routine healing  Compression fracture of l5 vertebra with delayed healing  S/p lumbar fusion: l4-5  Age-related osteoporosis with current pathological fracture with nonunion    Plan:  She will continue

## 2020-07-06 NOTE — PATIENT INSTRUCTIONS
Plan:  She will continue with the PT and her home exercise program.    The patient will continue with their current pain medications. The patient does not need any injections at this time. She will follow up in 3 months or sooner if needed.

## 2020-07-07 ENCOUNTER — OFFICE VISIT (OUTPATIENT)
Dept: PHYSICAL THERAPY | Facility: HOSPITAL | Age: 80
End: 2020-07-07
Attending: PHYSICAL MEDICINE & REHABILITATION
Payer: MEDICARE

## 2020-07-07 PROCEDURE — 97110 THERAPEUTIC EXERCISES: CPT

## 2020-07-07 NOTE — PROGRESS NOTES
Evonne Hernández    2/25/1940  Referring Physician:  Heather Canchola  Diagnosis: Acute left-sided thoracic back pain (M54.6)  Compression fracture of T11 vertebra with routine healing (S22.080D)  Compression fracture of T4 vertebra with routine healing (S22 9/16 10/16 11/16   Position Exercise HEP 6/22/2020 6/25/2020 7/7/2020    NuStep Seat 9, arms 9, resist 3,  X 10 min, 0 intervals X 10 min,  X 10 min, 2 intervals   Supine TA exhale H   X    Pelvic tilt   X X    TA set w p tilt   X X    TA skc H  X 5x2 X intermittent LBP. Pt required some re-teaching of core control principals and improved with use of self hand biofeedback. Pt was well challenged to perform TA dkc to add to her HEP.   She remains very motivated to continue to gain strength for fall preven touch of the LE dermatomes while in sitting.      Abdominals 5/26/2020   Tone  fair     Diastasis in fingers 5/26/2020   12 cm above umbilicus 2-3   6 cm above umbilicus 3   At umbilicus 3   6 cm below umbilicus 2   12 cm above umbilicus 1      Lumbopelvic

## 2020-07-14 ENCOUNTER — OFFICE VISIT (OUTPATIENT)
Dept: PHYSICAL THERAPY | Facility: HOSPITAL | Age: 80
End: 2020-07-14
Attending: PHYSICAL MEDICINE & REHABILITATION
Payer: MEDICARE

## 2020-07-14 PROCEDURE — 97112 NEUROMUSCULAR REEDUCATION: CPT

## 2020-07-14 PROCEDURE — 97110 THERAPEUTIC EXERCISES: CPT

## 2020-07-14 NOTE — PROGRESS NOTES
Discharge Summary    Pt has attended 12, cancelled 0, and no shown 0 visits in Physical Therapy.      Luz Elena Esquivel    2/25/1940  Referring Physician:  Emily Gross  Diagnosis: Acute left-sided thoracic back pain (M54.6)  Compression fracture of T11 ve control; double leg squat motor control; LE strength; functional squat/rise from chair; lumbar ROM. She remains limited in sit>stand without use of hands for initial lift and SLS balance.   See objective discharge data below in tables with initial evaluati Man Tx MFR       E stim Pt received interferential electric stimulation in supine w a HP to  . H = HEP. Pt given copies of this exercise for home program.  X = Exercises done this date - pt verbalized understanding and demonstrated competence.  All light touch of the LE dermatomes while in sitting.      Abdominals 5/26/2020 7/14/2020    Tone  fair good     Diastasis in fingers 5/26/2020   12 cm above umbilicus 2-3   6 cm above umbilicus 3   At umbilicus 3   6 cm below umbilicus 2   12 cm above umbilic

## 2020-07-21 ENCOUNTER — TELEPHONE (OUTPATIENT)
Dept: NEUROLOGY | Facility: CLINIC | Age: 80
End: 2020-07-21

## 2020-07-21 DIAGNOSIS — M54.17 LUMBOSACRAL RADICULOPATHY AT L5: Primary | ICD-10-CM

## 2020-07-21 NOTE — TELEPHONE ENCOUNTER
Refill request for norco 10/325 mg, take 1 tab every 6-8 hrs as needed, #120, no refills    LOV: 7/6/20  NOV: 10/26/20  [place]

## 2020-07-22 RX ORDER — HYDROCODONE BITARTRATE AND ACETAMINOPHEN 10; 325 MG/1; MG/1
TABLET ORAL
Qty: 120 TABLET | Refills: 0 | Status: SHIPPED | OUTPATIENT
Start: 2020-07-23 | End: 2020-08-19

## 2020-08-19 DIAGNOSIS — M54.17 LUMBOSACRAL RADICULOPATHY AT L5: ICD-10-CM

## 2020-08-19 NOTE — TELEPHONE ENCOUNTER
Medication request: Norco 10-325mg q6h prn pain    LOV tele visit 7/6/20  NOV 10/26/20    [place]: [place]24[place]20    Need to sign new pain contract at follow up visit-noted in appt

## 2020-08-20 RX ORDER — HYDROCODONE BITARTRATE AND ACETAMINOPHEN 10; 325 MG/1; MG/1
1 TABLET ORAL EVERY 6 HOURS PRN
Qty: 120 TABLET | Refills: 0 | Status: SHIPPED | OUTPATIENT
Start: 2020-08-23 | End: 2020-09-18

## 2020-09-18 DIAGNOSIS — M54.17 LUMBOSACRAL RADICULOPATHY AT L5: ICD-10-CM

## 2020-09-18 RX ORDER — HYDROCODONE BITARTRATE AND ACETAMINOPHEN 10; 325 MG/1; MG/1
1 TABLET ORAL EVERY 6 HOURS PRN
Qty: 120 TABLET | Refills: 0 | Status: SHIPPED | OUTPATIENT
Start: 2020-09-22 | End: 2020-10-22

## 2020-09-18 NOTE — TELEPHONE ENCOUNTER
Medication request: Norco 10-325mg q6h  Prn pain    LOV tele visit 7/6/20  NOV 10/26/20    [place] 8[place]23/20

## 2020-10-22 ENCOUNTER — TELEPHONE (OUTPATIENT)
Dept: NEUROLOGY | Facility: CLINIC | Age: 80
End: 2020-10-22

## 2020-10-22 DIAGNOSIS — M54.17 LUMBOSACRAL RADICULOPATHY AT L5: ICD-10-CM

## 2020-10-22 NOTE — TELEPHONE ENCOUNTER
Norco 10/325mg Take 1 tablet every 6 hours prn pain. #120.  No refills    [place]    LOV-7/6/20 (virtual)  NOV-10/26/20    Patient will need new pain contract at [facility]

## 2020-10-23 RX ORDER — HYDROCODONE BITARTRATE AND ACETAMINOPHEN 10; 325 MG/1; MG/1
1 TABLET ORAL EVERY 6 HOURS PRN
Qty: 120 TABLET | Refills: 0 | Status: SHIPPED | OUTPATIENT
Start: 2020-10-23 | End: 2020-11-23

## 2020-10-26 ENCOUNTER — OFFICE VISIT (OUTPATIENT)
Dept: NEUROLOGY | Facility: CLINIC | Age: 80
End: 2020-10-26
Payer: MEDICARE

## 2020-10-26 DIAGNOSIS — M43.16 SPONDYLOLISTHESIS AT L4-L5 LEVEL: ICD-10-CM

## 2020-10-26 DIAGNOSIS — S22.040D COMPRESSION FRACTURE OF T4 VERTEBRA WITH ROUTINE HEALING: ICD-10-CM

## 2020-10-26 DIAGNOSIS — M51.24 HERNIATED THORACIC DISC WITHOUT MYELOPATHY: ICD-10-CM

## 2020-10-26 DIAGNOSIS — M48.04 THORACIC STENOSIS: ICD-10-CM

## 2020-10-26 DIAGNOSIS — S22.080D COMPRESSION FRACTURE OF T11 VERTEBRA WITH ROUTINE HEALING: ICD-10-CM

## 2020-10-26 DIAGNOSIS — Z98.1 S/P LUMBAR FUSION: ICD-10-CM

## 2020-10-26 DIAGNOSIS — M51.26 BULGING LUMBAR DISC: ICD-10-CM

## 2020-10-26 DIAGNOSIS — M48.061 LUMBAR FORAMINAL STENOSIS: ICD-10-CM

## 2020-10-26 DIAGNOSIS — M51.9 THORACIC DISC DISEASE: ICD-10-CM

## 2020-10-26 DIAGNOSIS — S32.050G COMPRESSION FRACTURE OF L5 VERTEBRA WITH DELAYED HEALING: ICD-10-CM

## 2020-10-26 DIAGNOSIS — M43.10 RETROLISTHESIS OF VERTEBRAE: ICD-10-CM

## 2020-10-26 DIAGNOSIS — M54.17 LUMBOSACRAL RADICULOPATHY AT L5: Primary | ICD-10-CM

## 2020-10-26 DIAGNOSIS — M51.26 LUMBAR HERNIATED DISC: ICD-10-CM

## 2020-10-26 DIAGNOSIS — M43.16 SPONDYLOLISTHESIS OF LUMBAR REGION: ICD-10-CM

## 2020-10-26 DIAGNOSIS — Z96.642 STATUS POST LEFT HIP REPLACEMENT: ICD-10-CM

## 2020-10-26 DIAGNOSIS — M48.062 SPINAL STENOSIS OF LUMBAR REGION WITH NEUROGENIC CLAUDICATION: ICD-10-CM

## 2020-10-26 PROCEDURE — 99214 OFFICE O/P EST MOD 30 MIN: CPT | Performed by: PHYSICAL MEDICINE & REHABILITATION

## 2020-10-26 NOTE — PROGRESS NOTES
Low Back Pain H & P    Chief Complaint: Patient presents with:  Low Back Pain: LOV: 7/6/20 - Bilateral LBP that does not radiate to BLE. Pt notes occasional mid-back pain. Pain is constant, but varies in intensity, has \"good days\" and \"bad days\".  Worse pressure    • High cholesterol    • IBS (irritable bowel syndrome)    • Osteoarthritis    • OSTEOPOROSIS     lumbar spine   • OTHER DISEASES     lumbar spinal stenosis   • OTHER DISEASES     L4/L5 spondlyolisthesis   • OTHER DISEASES     thoracic DJD   • O History   Problem Relation Age of Onset   • Cancer Mother         melanoma   • Heart Disease Mother    • Hypertension Mother    • Musculo-skelatal Disorder Mother         osteoporosis   • Heart Disease Father    • Pulmonary Disease Father    • Hypertension Not Asked        Blood Transfusions: Not Asked        Caffeine Concern: No        Occupational Exposure: Not Asked        Hobby Hazards: Not Asked        Sleep Concern: No        Stress Concern: No        Weight Concern: Not Asked        Special Diet: Not Neurological Lower Extremity:    Light Touch Sensation: Intact in bilateral Lower Extremities   LE Muscle Strength:  All LE strength measurements 5/5 except:  Hamstring RIGHT:   4+/5  Hamstring LEFT:   4/5  Ankle Dorsiflexlon LEFT:   4+/5  Extensor Geraldo Ruiz

## 2020-10-26 NOTE — PATIENT INSTRUCTIONS
Plan  The patient will continue with her home exercise program.    She will follow up in 6 months or sooner if needed. She might need to have the PT again in the future. The patient will continue with their current pain medications.     The patient do

## 2020-11-23 DIAGNOSIS — M54.17 LUMBOSACRAL RADICULOPATHY AT L5: ICD-10-CM

## 2020-11-23 NOTE — TELEPHONE ENCOUNTER
Norco 10/325mg Take 1 tablet every 6 hours prn pain    [place] per [facility]      LOV-10/26/20  NOV-none to follow up in 4/2021    Routing to PSR to schedule NOV

## 2020-11-24 DIAGNOSIS — M54.17 LUMBOSACRAL RADICULOPATHY AT L5: ICD-10-CM

## 2020-11-24 RX ORDER — HYDROCODONE BITARTRATE AND ACETAMINOPHEN 10; 325 MG/1; MG/1
1 TABLET ORAL EVERY 6 HOURS PRN
Qty: 120 TABLET | Refills: 0 | Status: SHIPPED | OUTPATIENT
Start: 2020-11-27 | End: 2020-11-24

## 2020-11-24 RX ORDER — HYDROCODONE BITARTRATE AND ACETAMINOPHEN 10; 325 MG/1; MG/1
1 TABLET ORAL EVERY 6 HOURS PRN
Qty: 120 TABLET | Refills: 0 | Status: SHIPPED | OUTPATIENT
Start: 2020-11-27 | End: 2020-12-17

## 2020-11-24 NOTE — TELEPHONE ENCOUNTER
Norco 10/325mg Take 1 tablet every 6 hours prn pain. #120.  No refills    [place]    LOV-10/26/20  NOV-5/6/2021

## 2020-12-17 DIAGNOSIS — M54.17 LUMBOSACRAL RADICULOPATHY AT L5: ICD-10-CM

## 2020-12-17 NOTE — TELEPHONE ENCOUNTER
Norco 10/325mg Take 1 tablet every 6 hours prn pain #120.      [place]    LOV-10/26/20  NOV-5/6/2021

## 2020-12-18 RX ORDER — HYDROCODONE BITARTRATE AND ACETAMINOPHEN 10; 325 MG/1; MG/1
1 TABLET ORAL EVERY 6 HOURS PRN
Qty: 120 TABLET | Refills: 0 | Status: SHIPPED | OUTPATIENT
Start: 2020-12-18 | End: 2021-02-16

## 2021-02-16 DIAGNOSIS — M54.17 LUMBOSACRAL RADICULOPATHY AT L5: ICD-10-CM

## 2021-02-16 NOTE — TELEPHONE ENCOUNTER
Medication request: Hydrocodone-acetaminophen 10-325mg Oral tab. Take 1 tab PO q6h PRN for pain.      LOV: 10/26/2021  NOV 05/06/2021    [place] 12/29/2020 #120 Refill-0

## 2021-02-18 RX ORDER — HYDROCODONE BITARTRATE AND ACETAMINOPHEN 10; 325 MG/1; MG/1
1 TABLET ORAL EVERY 6 HOURS PRN
Qty: 120 TABLET | Refills: 0 | Status: SHIPPED | OUTPATIENT
Start: 2021-02-18 | End: 2021-03-19

## 2021-03-19 DIAGNOSIS — M54.17 LUMBOSACRAL RADICULOPATHY AT L5: ICD-10-CM

## 2021-03-19 NOTE — TELEPHONE ENCOUNTER
Medication request: HYDROcodone-acetaminophen (NORCO)  MG Oral Tab. Take 1 tablet by mouth every 6 (six) hours as needed for Pain.        LOV: 10/26/2020  NOV:  05/06/2021    [place] 02/20/2021 #120 Refill-0

## 2021-03-22 RX ORDER — HYDROCODONE BITARTRATE AND ACETAMINOPHEN 10; 325 MG/1; MG/1
1 TABLET ORAL EVERY 6 HOURS PRN
Qty: 120 TABLET | Refills: 0 | Status: SHIPPED | OUTPATIENT
Start: 2021-03-22 | End: 2021-04-23

## 2021-04-03 PROBLEM — S32.050G COMPRESSION FRACTURE OF L5 VERTEBRA WITH DELAYED HEALING: Status: RESOLVED | Noted: 2019-10-22 | Resolved: 2021-04-03

## 2021-04-03 PROBLEM — S22.080D COMPRESSION FRACTURE OF T11 VERTEBRA WITH ROUTINE HEALING: Status: RESOLVED | Noted: 2020-02-20 | Resolved: 2021-04-03

## 2021-04-03 PROBLEM — M54.6 ACUTE LEFT-SIDED THORACIC BACK PAIN: Status: RESOLVED | Noted: 2019-04-29 | Resolved: 2021-04-03

## 2021-04-13 PROBLEM — D69.2 SENILE PURPURA (HCC): Status: ACTIVE | Noted: 2021-04-13

## 2021-04-13 PROBLEM — F33.8 OTHER RECURRENT DEPRESSIVE DISORDERS (HCC): Status: ACTIVE | Noted: 2021-04-13

## 2021-04-23 DIAGNOSIS — M54.17 LUMBOSACRAL RADICULOPATHY AT L5: ICD-10-CM

## 2021-04-23 NOTE — TELEPHONE ENCOUNTER
Medication request: HYDROcodone-acetaminophen (NORCO)  MG Oral Tab.  Take 1 tablet by mouth every 6 (six) hours as needed for Pain    LOV: 10/26/2021  NOV: 05/06/2021    [place] 03/23/2021 #120 Refill-0

## 2021-04-28 RX ORDER — HYDROCODONE BITARTRATE AND ACETAMINOPHEN 10; 325 MG/1; MG/1
1 TABLET ORAL EVERY 6 HOURS PRN
Qty: 120 TABLET | Refills: 0 | Status: SHIPPED | OUTPATIENT
Start: 2021-04-28 | End: 2021-05-06

## 2021-05-06 ENCOUNTER — OFFICE VISIT (OUTPATIENT)
Dept: NEUROLOGY | Facility: CLINIC | Age: 81
End: 2021-05-06
Payer: MEDICARE

## 2021-05-06 ENCOUNTER — TELEPHONE (OUTPATIENT)
Dept: NEUROLOGY | Facility: CLINIC | Age: 81
End: 2021-05-06

## 2021-05-06 VITALS — BODY MASS INDEX: 22.66 KG/M2 | WEIGHT: 120 LBS | HEIGHT: 61 IN

## 2021-05-06 DIAGNOSIS — M51.26 LUMBAR HERNIATED DISC: ICD-10-CM

## 2021-05-06 DIAGNOSIS — M43.10 RETROLISTHESIS OF VERTEBRAE: ICD-10-CM

## 2021-05-06 DIAGNOSIS — M47.816 ARTHRITIS OF FACET JOINT OF LUMBAR SPINE: ICD-10-CM

## 2021-05-06 DIAGNOSIS — M48.061 LUMBAR FORAMINAL STENOSIS: ICD-10-CM

## 2021-05-06 DIAGNOSIS — Z96.642 STATUS POST LEFT HIP REPLACEMENT: ICD-10-CM

## 2021-05-06 DIAGNOSIS — M43.16 SPONDYLOLISTHESIS OF LUMBAR REGION: ICD-10-CM

## 2021-05-06 DIAGNOSIS — M51.26 BULGING LUMBAR DISC: ICD-10-CM

## 2021-05-06 DIAGNOSIS — M48.062 SPINAL STENOSIS OF LUMBAR REGION WITH NEUROGENIC CLAUDICATION: ICD-10-CM

## 2021-05-06 DIAGNOSIS — M54.17 LUMBOSACRAL RADICULOPATHY AT L5: Primary | ICD-10-CM

## 2021-05-06 DIAGNOSIS — Z98.1 S/P LUMBAR FUSION: ICD-10-CM

## 2021-05-06 PROCEDURE — 99214 OFFICE O/P EST MOD 30 MIN: CPT | Performed by: PHYSICAL MEDICINE & REHABILITATION

## 2021-05-06 RX ORDER — HYDROCODONE BITARTRATE AND ACETAMINOPHEN 10; 325 MG/1; MG/1
1 TABLET ORAL EVERY 6 HOURS PRN
Qty: 120 TABLET | Refills: 0 | Status: SHIPPED | OUTPATIENT
Start: 2021-05-28 | End: 2021-06-23

## 2021-05-06 NOTE — PROGRESS NOTES
Low Back Pain H & P    Chief Complaint: Patient presents with:  Back Pain: LOV: 10/26/2020. Patient following up for lower back pain . Patient still doing home exercises. Patient rates pain 6/10. Denies N/T. No new or worsening sypmtoms from LOV.  Patient w Osteoarthritis    • OSTEOPOROSIS     lumbar spine   • OTHER DISEASES     lumbar spinal stenosis   • OTHER DISEASES     L4/L5 spondlyolisthesis   • OTHER DISEASES     thoracic DJD   • OTHER DISEASES     fibrocystic breast disease   • OTHER DISEASES     plan osteoporosis   • Heart Disease Father    • Pulmonary Disease Father    • Hypertension Father    • Cancer Father         lymphoma   • Breast Cancer Paternal Aunt         post menopause       Social History   Social History    Socioeconomic History      M of Stress :   Social Connections:       Frequency of Communication with Friends and Family:       Frequency of Social Gatherings with Friends and Family:       Attends Sikh Services:       Active Member of Clubs or Organizations:       Attends Club or Dorsalis pedis pulse-RIGHT 2+   Dorsalis pedis pulse-LEFT 2+   Tibialis posterior pulse-RIGHT 2+   Tibialis posterior pulse-LEFT 2+     Neurological Lower Extremity:    Light Touch Sensation: Intact in bilateral Lower Extremities   LE Muscle Strength:  Al

## 2021-05-06 NOTE — TELEPHONE ENCOUNTER
Per Medicare Guidelines -no authorization is required for MBBs    Status: Approved-no authorization required per health plan     Clinical staff can proceed with scheduling  Bilateral L2, L3, L4 and L5 medial branch blocks CPT [id number], [id number], [id number]

## 2021-05-06 NOTE — PATIENT INSTRUCTIONS
Plan  I will perform bilateral L2, L3, L4 and L5 medial branch block. If the above helps her, then I will do radiofrequency neurotomies to the bilateral L2, L3, L4 and L5 medial branch nerves. She will continue with the Norco for the pain.     The p

## 2021-05-07 NOTE — TELEPHONE ENCOUNTER
Patient has been scheduled for a Bilateral L2, L3, L4 and L5 medial branch blocks on 5/18/21 at the [facility]. Medications and allergies reviewed.  Patient informed we will need verbal or written authorization from patients Primary Care Physician/Cardiologist to

## 2021-05-18 ENCOUNTER — OFFICE VISIT (OUTPATIENT)
Dept: SURGERY | Facility: CLINIC | Age: 81
End: 2021-05-18

## 2021-05-18 DIAGNOSIS — M47.816 ARTHRITIS OF FACET JOINT OF LUMBAR SPINE: Primary | ICD-10-CM

## 2021-05-18 PROCEDURE — 64494 INJ PARAVERT F JNT L/S 2 LEV: CPT | Performed by: PHYSICAL MEDICINE & REHABILITATION

## 2021-05-18 PROCEDURE — 64493 INJ PARAVERT F JNT L/S 1 LEV: CPT | Performed by: PHYSICAL MEDICINE & REHABILITATION

## 2021-05-18 NOTE — PROCEDURES
Nj Granados.    LUMBAR MEDIAL BRANCH BLOCK   NAME:  Campos Berger    MR #:    [id number] :  1940     PHYSICIAN:  Eulalia Wheatley        Operative Report    DATE OF PROCEDURE: 2021   PREOPERATIVE DIAGNOSES: 1.  Arthritis o instructions and will follow up in the clinic as scheduled. Throughout the whole procedure, the patient's pulse oximetry and vital signs were monitored and they remained completely stable.   Also, throughout the whole procedure, prior to injection of any m

## 2021-05-19 ENCOUNTER — TELEPHONE (OUTPATIENT)
Dept: NEUROLOGY | Facility: CLINIC | Age: 81
End: 2021-05-19

## 2021-05-19 NOTE — TELEPHONE ENCOUNTER
Condition update after injection. S/w patient:   - Patient had bilateral L2, L3, L4 and L5 Medial Branch Block  (specific injection) on 051/18/2021 (date) with .   - Pain level prior to injection: 7/10  -Current pain level: 5/10.   - 40% relief for

## 2021-05-20 NOTE — TELEPHONE ENCOUNTER
What was the maximal amount of relief that she had the day of the injections? If it was 70+%, then she can have the RFN's to the same levels.

## 2021-05-20 NOTE — TELEPHONE ENCOUNTER
Patient called back again today with additional update and left a VM stating today her pain has improved even more and she is at 75% relief. She is no longer achy and pain has improved.     Called and spoke with patient and informed her I would relay additi

## 2021-05-20 NOTE — TELEPHONE ENCOUNTER
Spoke with patient who stated the day of the block she had about 40-50% pain relief and the following day it went down to 30%. Informed patient message would be rel[facility] to see what the next steps would be.  Patient understood and was appre

## 2021-05-24 NOTE — TELEPHONE ENCOUNTER
Spoke with patient who wanted to clarify why the nerve block was done before the ablation. Informed patient the nerve blocks are almost like a trial run for the ablations.  If she didn't receive a good amount of pain relief rom the nerve block than she most

## 2021-05-24 NOTE — TELEPHONE ENCOUNTER
I called the patient back but her  stated that she was still sleeping. I told him that I would try to call back at noon.

## 2021-05-25 NOTE — TELEPHONE ENCOUNTER
She should keep the July appointment if she is still about 75% better. If she is worse, then she should have a sooner appointment.

## 2021-05-26 NOTE — TELEPHONE ENCOUNTER
Unable to reach patient and unable to leave a message. Will need to try and reach out to patient again at a later time.

## 2021-06-22 DIAGNOSIS — M54.17 LUMBOSACRAL RADICULOPATHY AT L5: ICD-10-CM

## 2021-06-23 NOTE — TELEPHONE ENCOUNTER
Patient left  requesting refill for her [place].    Narcotic Refill Request    Medication request: HYDROcodone-acetaminophen (NORCO)  MG  Take 1 tablet by mouth every 6 (six) hours as needed for Pain.     LOV: 5/18/21-injection, 5/6/21-OV  NOV: 7/29/2

## 2021-06-24 RX ORDER — HYDROCODONE BITARTRATE AND ACETAMINOPHEN 10; 325 MG/1; MG/1
1 TABLET ORAL EVERY 6 HOURS PRN
Qty: 120 TABLET | Refills: 0 | Status: SHIPPED | OUTPATIENT
Start: 2021-06-28 | End: 2021-07-28

## 2021-07-29 ENCOUNTER — OFFICE VISIT (OUTPATIENT)
Dept: NEUROLOGY | Facility: CLINIC | Age: 81
End: 2021-07-29
Payer: MEDICARE

## 2021-07-29 VITALS — WEIGHT: 120 LBS | BODY MASS INDEX: 22.66 KG/M2 | HEIGHT: 61 IN

## 2021-07-29 DIAGNOSIS — M48.061 LUMBAR FORAMINAL STENOSIS: ICD-10-CM

## 2021-07-29 DIAGNOSIS — M54.17 LUMBOSACRAL RADICULOPATHY AT L5: Primary | ICD-10-CM

## 2021-07-29 DIAGNOSIS — M51.26 LUMBAR HERNIATED DISC: ICD-10-CM

## 2021-07-29 DIAGNOSIS — M48.04 THORACIC STENOSIS: ICD-10-CM

## 2021-07-29 DIAGNOSIS — M43.10 RETROLISTHESIS OF VERTEBRAE: ICD-10-CM

## 2021-07-29 DIAGNOSIS — M48.062 SPINAL STENOSIS OF LUMBAR REGION WITH NEUROGENIC CLAUDICATION: ICD-10-CM

## 2021-07-29 DIAGNOSIS — M47.816 ARTHRITIS OF FACET JOINT OF LUMBAR SPINE: ICD-10-CM

## 2021-07-29 DIAGNOSIS — M43.16 SPONDYLOLISTHESIS OF LUMBAR REGION: ICD-10-CM

## 2021-07-29 DIAGNOSIS — S22.040D COMPRESSION FRACTURE OF T4 VERTEBRA WITH ROUTINE HEALING: ICD-10-CM

## 2021-07-29 DIAGNOSIS — Z98.1 S/P LUMBAR FUSION: ICD-10-CM

## 2021-07-29 DIAGNOSIS — M51.26 BULGING LUMBAR DISC: ICD-10-CM

## 2021-07-29 DIAGNOSIS — M51.24 HERNIATED THORACIC DISC WITHOUT MYELOPATHY: ICD-10-CM

## 2021-07-29 DIAGNOSIS — S32.050D COMPRESSION FRACTURE OF L5 VERTEBRA WITH ROUTINE HEALING, SUBSEQUENT ENCOUNTER: ICD-10-CM

## 2021-07-29 DIAGNOSIS — M51.9 THORACIC DISC DISEASE: ICD-10-CM

## 2021-07-29 PROCEDURE — 99214 OFFICE O/P EST MOD 30 MIN: CPT | Performed by: PHYSICAL MEDICINE & REHABILITATION

## 2021-07-29 RX ORDER — HYDROCODONE BITARTRATE AND ACETAMINOPHEN 10; 325 MG/1; MG/1
1 TABLET ORAL EVERY 6 HOURS PRN
Qty: 120 TABLET | Refills: 0 | Status: SHIPPED | OUTPATIENT
Start: 2021-07-30 | End: 2021-08-30

## 2021-07-29 NOTE — PATIENT INSTRUCTIONS
Plan  I told her that I would on doing any further epidural steroid injections due to her osteoporosis and history of compressioin fractures unless her endocrinologist tells me that it is ok for her to have these again.     She will think about looking in

## 2021-07-29 NOTE — PROGRESS NOTES
Low Back Pain H & P    Chief Complaint: Patient presents with:  Back Pain: Injections 05/18/2021 bilateral L2, L3, L4 and L5 Medial Branch Block. LOV: 05/06/2021. Patient states she had 0% improvment from injecitons.  Patient doing home exercises and taking syndrome)    • Osteoarthritis    • OSTEOPOROSIS     lumbar spine   • OTHER DISEASES     lumbar spinal stenosis   • OTHER DISEASES     L4/L5 spondlyolisthesis   • OTHER DISEASES     thoracic DJD   • OTHER DISEASES     fibrocystic breast disease   • OTHER DI Disorder Mother         osteoporosis   • Heart Disease Father    • Pulmonary Disease Father    • Hypertension Father    • Cancer Father         lymphoma   • Breast Cancer Paternal Aunt         post menopause       Social History   Social History    Socioec Stress:       Feeling of Stress :   Social Connections:       Frequency of Communication with Friends and Family:       Frequency of Social Gatherings with Friends and Family:       Attends [employer] Services:       Active Member of Clubs or Organization lower extremity:   Dorsalis pedis pulse-RIGHT 2+   Dorsalis pedis pulse-LEFT 2+   Tibialis posterior pulse-RIGHT 2+   Tibialis posterior pulse-LEFT 2+     Neurological Lower Extremity:    Light Touch Sensation: Intact in bilateral Lower Extremities   LE Mu sooner if needed. The patient understands and agrees with the stated plan. Cesar Gray MD  7/29/2021

## 2021-08-30 DIAGNOSIS — M54.17 LUMBOSACRAL RADICULOPATHY AT L5: ICD-10-CM

## 2021-08-30 RX ORDER — HYDROCODONE BITARTRATE AND ACETAMINOPHEN 10; 325 MG/1; MG/1
1 TABLET ORAL EVERY 6 HOURS PRN
Qty: 120 TABLET | Refills: 0 | Status: SHIPPED | OUTPATIENT
Start: 2021-08-30 | End: 2021-09-29

## 2021-08-30 NOTE — TELEPHONE ENCOUNTER
Reviewed Dr. Johnna Granda last note from 7/29/2021. He recommended continuing Norco 10/325. Will refill and Mini Mackey should follow up with Dr. Kim Begum. Raffy Florentino.  Toney Buck MD, [facility]  Physical Medicine and Rehabilitation/Sports Medicine  [facility]

## 2021-08-30 NOTE — TELEPHONE ENCOUNTER
Narcotic Refill Request    Medication request: HYDROcodone-acetaminophen (NORCO)  MG Oral Tab   Take 1 tablet by mouth every 6 (six) hours as needed for Pain.      LOV: 7/29/21  NOV: 1/13/22     [place] 7/30/21 #120

## 2021-09-29 DIAGNOSIS — M54.17 LUMBOSACRAL RADICULOPATHY AT L5: ICD-10-CM

## 2021-09-29 NOTE — TELEPHONE ENCOUNTER
Narcotic Refill Request    Medication request: HYDROcodone-acetaminophen (NORCO)  MG Oral Tab Take 1 tablet by mouth every 6 (six) hours as needed for Pain.     LOV: 7/29/21  NOV: 1/6/2022     [place] 8/30/21 #120

## 2021-09-30 RX ORDER — HYDROCODONE BITARTRATE AND ACETAMINOPHEN 10; 325 MG/1; MG/1
1 TABLET ORAL EVERY 6 HOURS PRN
Qty: 120 TABLET | Refills: 0 | Status: SHIPPED | OUTPATIENT
Start: 2021-09-30 | End: 2021-10-29

## 2021-10-28 ENCOUNTER — TELEPHONE (OUTPATIENT)
Dept: NEUROLOGY | Facility: CLINIC | Age: 81
End: 2021-10-28

## 2021-10-29 DIAGNOSIS — M54.17 LUMBOSACRAL RADICULOPATHY AT L5: ICD-10-CM

## 2021-10-29 RX ORDER — HYDROCODONE BITARTRATE AND ACETAMINOPHEN 10; 325 MG/1; MG/1
1 TABLET ORAL EVERY 6 HOURS PRN
Qty: 120 TABLET | Refills: 0 | Status: SHIPPED | OUTPATIENT
Start: 2021-10-31 | End: 2021-12-06

## 2021-10-29 NOTE — TELEPHONE ENCOUNTER
Medication request: Norco 10-325mg q6h prn pain    LOV 7/29/21  NOV 1/6/22    [place]: 10/1/21 #120

## 2021-12-06 DIAGNOSIS — M54.17 LUMBOSACRAL RADICULOPATHY AT L5: ICD-10-CM

## 2021-12-06 RX ORDER — HYDROCODONE BITARTRATE AND ACETAMINOPHEN 10; 325 MG/1; MG/1
1 TABLET ORAL EVERY 6 HOURS PRN
Qty: 120 TABLET | Refills: 0 | Status: SHIPPED | OUTPATIENT
Start: 2021-12-06 | End: 2022-01-06

## 2022-01-06 ENCOUNTER — TELEPHONE (OUTPATIENT)
Dept: PHYSICAL MEDICINE AND REHAB | Facility: CLINIC | Age: 82
End: 2022-01-06

## 2022-01-06 ENCOUNTER — OFFICE VISIT (OUTPATIENT)
Dept: PHYSICAL MEDICINE AND REHAB | Facility: CLINIC | Age: 82
End: 2022-01-06
Payer: MEDICARE

## 2022-01-06 VITALS
DIASTOLIC BLOOD PRESSURE: 84 MMHG | HEART RATE: 69 BPM | OXYGEN SATURATION: 98 % | WEIGHT: 120 LBS | SYSTOLIC BLOOD PRESSURE: 160 MMHG | HEIGHT: 61 IN | BODY MASS INDEX: 22.66 KG/M2

## 2022-01-06 DIAGNOSIS — M43.16 SPONDYLOLISTHESIS OF LUMBAR REGION: ICD-10-CM

## 2022-01-06 DIAGNOSIS — M54.17 LUMBOSACRAL RADICULOPATHY AT L5: Primary | ICD-10-CM

## 2022-01-06 DIAGNOSIS — M48.062 SPINAL STENOSIS OF LUMBAR REGION WITH NEUROGENIC CLAUDICATION: ICD-10-CM

## 2022-01-06 DIAGNOSIS — M48.061 LUMBAR FORAMINAL STENOSIS: ICD-10-CM

## 2022-01-06 DIAGNOSIS — S32.050D COMPRESSION FRACTURE OF L5 VERTEBRA WITH ROUTINE HEALING, SUBSEQUENT ENCOUNTER: ICD-10-CM

## 2022-01-06 DIAGNOSIS — M43.16 SPONDYLOLISTHESIS AT L4-L5 LEVEL: ICD-10-CM

## 2022-01-06 DIAGNOSIS — M51.26 BULGING LUMBAR DISC: ICD-10-CM

## 2022-01-06 DIAGNOSIS — M43.10 RETROLISTHESIS OF VERTEBRAE: ICD-10-CM

## 2022-01-06 DIAGNOSIS — M51.26 LUMBAR HERNIATED DISC: ICD-10-CM

## 2022-01-06 DIAGNOSIS — S22.040D COMPRESSION FRACTURE OF T4 VERTEBRA WITH ROUTINE HEALING: ICD-10-CM

## 2022-01-06 DIAGNOSIS — Z98.1 S/P LUMBAR FUSION: ICD-10-CM

## 2022-01-06 PROCEDURE — 99214 OFFICE O/P EST MOD 30 MIN: CPT | Performed by: PHYSICAL MEDICINE & REHABILITATION

## 2022-01-06 RX ORDER — HYDROCODONE BITARTRATE AND ACETAMINOPHEN 10; 325 MG/1; MG/1
1 TABLET ORAL EVERY 6 HOURS PRN
Qty: 120 TABLET | Refills: 0 | Status: SHIPPED | OUTPATIENT
Start: 2022-01-06 | End: 2022-02-05

## 2022-01-06 NOTE — PROGRESS NOTES
Low Back Pain H & P    Chief Complaint: Patient presents with:  Back Pain: LOV 07/29/21. Per patient, her endocrinologist states it is ok for patient to get ESIs. No new or worsening symptoms. Pain 8/10. Pain is in mid and low back with no radiation.  Brianna Vila fibroids/hyperplasia   • Rheumatic fever in pediatric patient    • Visual impairment     glasses prn       Past Surgical History   Past Surgical History:   Procedure Laterality Date   • [facility] Years of education: Not on file      Highest education level: Not on file    Occupational History      Not on file    Tobacco Use      Smoking status: Never Smoker      Smokeless tobacco: Never Used    Vaping Use      Vaping Use: Never used    Substance lesions.     Vitals:   01/06/22  1604   BP: 160/84   Pulse: 69       Gait:    Gait: Normal gait   Sit to Stand: no difficulty, but mildly increased pain     Lumbar Spine:    Scoliosis: Thoracic levoscoliosis that is mild and Lumbar dextroscoliosis that is m which type of injection she will need to have. I refilled her [place].    The patient will follow up in 2-3 months, but the patient will call me 2 weeks after having the injection to let me know how the injection worked.     The patient understands and agr

## 2022-01-06 NOTE — PATIENT INSTRUCTIONS
Plan  She will get a new MRI of the lumbar spine. She will call me once she has had this and I will review it and tell her which type of injection she will need to have.     I refilled her Norco.    The patient will follow up in 2-3 months, but the radha

## 2022-01-06 NOTE — TELEPHONE ENCOUNTER
Per Medicare Guidelines -no authorization is required for imaging     Status: Approved-Covered Benefit     Patient  Jolynn Alonso notified patient can proceed with scheduling L-Spine MRI w+wo CPT 99974

## 2022-01-27 ENCOUNTER — HOSPITAL ENCOUNTER (OUTPATIENT)
Dept: MRI IMAGING | Age: 82
Discharge: HOME | End: 2022-01-27
Attending: PHYSICAL MEDICINE & REHABILITATION
Payer: MEDICARE

## 2022-01-27 DIAGNOSIS — M54.17 LUMBOSACRAL RADICULOPATHY AT L5: ICD-10-CM

## 2022-01-27 LAB — CREAT BLD-MCNC: 0.7 MG/DL

## 2022-01-27 PROCEDURE — A9575 INJ GADOTERATE MEGLUMI 0.1ML: HCPCS | Performed by: PHYSICAL MEDICINE & REHABILITATION

## 2022-01-27 PROCEDURE — 72158 MRI LUMBAR SPINE W/O & W/DYE: CPT | Performed by: PHYSICAL MEDICINE & REHABILITATION

## 2022-01-27 PROCEDURE — 82565 ASSAY OF CREATININE: CPT

## 2022-02-03 ENCOUNTER — TELEPHONE (OUTPATIENT)
Dept: PHYSICAL MEDICINE AND REHAB | Facility: CLINIC | Age: 82
End: 2022-02-03

## 2022-02-03 NOTE — TELEPHONE ENCOUNTER
Pt send a message informing : she has complete  her MRI on 01/27.  needs to review and recommend what type of INJ pt needs . please assist

## 2022-02-03 NOTE — TELEPHONE ENCOUNTER
Patient completed lumbar spine MRI (w&wo) on 1/27/22. LOV: 1/6/22  NOV: none scheduled    Per [address] at [address] [place] [address]: \"She will call me once she has had this and I will review it and tell her which type of injection she will need to have. \"    Message forwarded on to [address] to review MRI and advise on injection.

## 2022-02-04 NOTE — TELEPHONE ENCOUNTER
Patient called and left a message with the answering service, I called her back and left a message letting her know we are just waiting on Dr Micheal Cancino to review and respond.

## 2022-02-05 NOTE — TELEPHONE ENCOUNTER
I reviewed her MRI scan and her bulging discs and stenosis are worse. I will do bilateral L3 TFESI's.

## 2022-02-07 ENCOUNTER — TELEPHONE (OUTPATIENT)
Dept: PHYSICAL MEDICINE AND REHAB | Facility: CLINIC | Age: 82
End: 2022-02-07

## 2022-02-07 RX ORDER — HYDROCODONE BITARTRATE AND ACETAMINOPHEN 10; 325 MG/1; MG/1
1 TABLET ORAL EVERY 6 HOURS PRN
Qty: 120 TABLET | Refills: 0 | Status: SHIPPED | OUTPATIENT
Start: 2022-02-07 | End: 2022-03-09

## 2022-02-07 NOTE — TELEPHONE ENCOUNTER
Per Medicare Guidelines -no authorization is required for chana     Status: Approved-Covered Benefit    Clinical staff can proceed with scheduling Bilateral L3 TFESIs CPT 10513-01

## 2022-02-07 NOTE — TELEPHONE ENCOUNTER
Patient has been scheduled for a Bilateral L3 TFESIs on 2/11/22 at the [address] [place] [address]. Medications and allergies reviewed. Patient informed to hold aspirins, nsaids, blood thinners, multivitamins, phentermine, vitamin E and fish oils 3-7 days prior to procedure. Patient informed she will need a . Patient informed not to eat or drink anything after midnight the night prior to the procedure. Pt verbalized understanding and agrees with plan. Added to casetabs.

## 2022-02-07 NOTE — TELEPHONE ENCOUNTER
Refill Request    Medication request: HYDROcodone-acetaminophen (NORCO)  MG Oral Tab   Take 1 tablet by mouth every 6 (six) hours as needed for Pain.     LOV:1/6/2022   Due back to clinic per last office note:  2-3 months  NOV: 5/2/2022 Nelda Brush MD      [place]: 1/6/22 #120 r-0    Urine drug screen (if applicable): none on file  Pain contract: Signed 7/29/21    LOV plan (if weaning or changing medications): Continue with Norco

## 2022-02-11 ENCOUNTER — OFFICE VISIT (OUTPATIENT)
Dept: SURGERY | Facility: CLINIC | Age: 82
End: 2022-02-11

## 2022-02-11 DIAGNOSIS — M54.17 LUMBOSACRAL RADICULOPATHY AT L5: Primary | ICD-10-CM

## 2022-02-11 DIAGNOSIS — Z98.1 S/P LUMBAR FUSION: ICD-10-CM

## 2022-02-11 DIAGNOSIS — M51.26 BULGING LUMBAR DISC: ICD-10-CM

## 2022-02-11 DIAGNOSIS — M48.062 SPINAL STENOSIS OF LUMBAR REGION WITH NEUROGENIC CLAUDICATION: ICD-10-CM

## 2022-02-11 PROCEDURE — 64483 NJX AA&/STRD TFRM EPI L/S 1: CPT | Performed by: PHYSICAL MEDICINE & REHABILITATION

## 2022-02-11 NOTE — PROCEDURES
[facility]    BILATERAL LUMBAR TRANSFORAMINAL   NAME:  Joy Dietrich    MR #:    [id number] :  1940     PHYSICIAN:  Yazmin Cardona MD        Operative Report    DATE OF PROCEDURE: 2022   PREOPERATIVE DIAGNOSES: 1. bilateral L5-S1 radiculopathy    2. L5-S1 mod diffuse, L3-4 mod-large diffuse, L2-3 large diffuse, L1-2 mod diffuse bulging discs    3. L3-4 mod-severe, L2-3 mod-severe, L1-2 mod central stenosis    4. S/P lumbar fusion: L4-5        POSTOPERATIVE DIAGNOSES:   1. bilateral L5-S1 radiculopathy    2. L5-S1 mod diffuse, L3-4 mod-large diffuse, L2-3 large diffuse, L1-2 mod diffuse bulging discs    3. L3-4 mod-severe, L2-3 mod-severe, L1-2 mod central stenosis    4. S/P lumbar fusion: L4-5        PROCEDURES: Bilateral L3 transforaminal epidural steroid injections done under fluoroscopic guidance with contrast enhancement. SURGEON: Yazmin Wheatley MD   ANESTHESIA: Local   INDICATIONS:      OPERATIVE PROCEDURE:  Written consent was obtained from the patient. The patient was brought into the operating room and placed in the prone position on the fluoroscopy table with pillow underneath her abdomen. The patient's skin was cleaned and draped in a normal sterile fashion. Using AP fluoroscopy, all five lumbar vertebrae were identified. When the third vertebra was identified, fluoroscopy was left anterior obliqued opening up the right L3-4 intervertebral foramen. At this point in time, the patient's skin was anesthetized with 1% PF lidocaine without epinephrine. Then, a 3.5 inch, 22-gauge spinal needle was inserted and directed towards the right L3-4 intervertebral foramen. When it felt to be in good position, AP fluoroscopy was used to advance the needle to the 6 o'clock position on the right L3 pedicle. At this point in time, Omnipaque-240 contrast was used to obtain a good epidurogram indicating correct needle placement. Then, aspiration was performed.   No blood, fluid, or air was aspirated. Then, the patient was injected with a 3 cc solution of 1.5 cc of 40 mg/cc of Kenalog and 1.5 cc of 1% PF lidocaine without epinephrine. After this, the needle was removed. Then  fluoroscopy was right anterior obliqued opening up the left L3-4 intervertebral foramen. At this point in time, the patient's skin was anesthetized with 1 to 2 cc of 1% PF lidocaine without epinephrine. Then, a 3.5 inch, 22-gauge spinal needle was inserted and directed towards the left L3-4 intervertebral foramen. When it felt to be in good position, AP fluoroscopy was used to advance the needle to the 6 o'clock position on the left L3 pedicle. At this point in time, Omnipaque-240 contrast was used to obtain a good epidurogram indicating correct needle placement. Then, aspiration was performed. No blood, fluid, or air was aspirated. Then, the patient was injected with a 3 cc solution of 1.5 cc of 40 mg/cc of Kenalog and 1.5 cc of 1% PF lidocaine without epinephrine. After this, the needle was removed. The patient's skin was cleaned. Band-Aids were applied. The patient was transferred to the cart and into [facility]. The patient was given discharge instructions and will follow up in the clinic as scheduled. Throughout the whole procedure, the patient's pulse oximetry and vital signs were monitored and they remained completely stable. Also, throughout the whole procedure, prior to injection of any medication, aspiration was performed. No blood, fluid, or air was aspirated at anytime.

## 2022-02-21 ENCOUNTER — TELEPHONE (OUTPATIENT)
Dept: NEUROLOGY | Facility: CLINIC | Age: 82
End: 2022-02-21

## 2022-02-23 NOTE — TELEPHONE ENCOUNTER
Condition update after Procedure. - Patient had Bilateral L3 transforaminal epidural steroid injections done under fluoroscopic guidance with contrast enhancement on 2/11/22 with Loli Shirley. - 40-45% relief    - LOV: 7/29/2021 Raul Wheatley MD    - NOV: 5/2/22      Nothing further needed at this time.

## 2022-03-09 RX ORDER — HYDROCODONE BITARTRATE AND ACETAMINOPHEN 10; 325 MG/1; MG/1
1 TABLET ORAL EVERY 6 HOURS PRN
Qty: 120 TABLET | Refills: 0 | Status: SHIPPED | OUTPATIENT
Start: 2022-03-10 | End: 2022-04-09

## 2022-03-09 NOTE — TELEPHONE ENCOUNTER
Refill Request    Medication request: HYDROcodone-acetaminophen (NORCO)  MG Oral Tab Take 1 tablet by mouth every 6 (six) hours as needed for Pain.     LOV:1/6/22   Due back to clinic per last office note:  \"follow up in 2-3 months\"  NOV: 5/2/22     [place]: 2/8/22 #120    Urine drug screen (if applicable): none  Pain contract: Valid until 8/6/22    LOV plan (if weaning or changing medications): no change

## 2022-04-07 RX ORDER — HYDROCODONE BITARTRATE AND ACETAMINOPHEN 10; 325 MG/1; MG/1
1 TABLET ORAL EVERY 6 HOURS PRN
Qty: 120 TABLET | Refills: 0 | Status: SHIPPED | OUTPATIENT
Start: 2022-04-09 | End: 2022-05-09

## 2022-04-07 NOTE — TELEPHONE ENCOUNTER
Refill Request    Medication request: HYDROcodone-acetaminophen (NORCO)  MG Oral Tab  Take 1 tablet by mouth every 6 (six) hours as needed for Pain. LOV:1/6/22  Due back to clinic per last office note:  2-3 months  NOV: 5/2/22      [place]: 3/10/22 #120 r-0    Urine drug screen (if applicable): none on file  Pain contract: Signed on 7/29/21    LOV plan (if weaning or changing medications): \"I refilled her Norco.\" No plan to change medication per Dr. Mick Mcclelland note.

## 2022-05-13 RX ORDER — HYDROCODONE BITARTRATE AND ACETAMINOPHEN 10; 325 MG/1; MG/1
1 TABLET ORAL EVERY 6 HOURS PRN
Qty: 120 TABLET | Refills: 0 | Status: SHIPPED | OUTPATIENT
Start: 2022-05-13 | End: 2022-06-12

## 2022-05-13 NOTE — TELEPHONE ENCOUNTER
Refill Request    Medication request: HYDROcodone-acetaminophen (NORCO)  MG Oral Tab     LOV:1/6/2022 Billy Wheatley MD   Due back to clinic per last office note: The patient will follow up in 2-3 months.    NOV: 8/9/2022 Chino Mast MD      [facility] UPMCP/Last refill: 4/9/22

## 2022-05-16 NOTE — TELEPHONE ENCOUNTER
Patient stating she had the stomach flu with N/V/D and that was her reasoning for cancelling her 5/2/22 appt.

## 2022-06-13 DIAGNOSIS — M54.17 LUMBOSACRAL RADICULOPATHY AT L5: ICD-10-CM

## 2022-06-13 NOTE — TELEPHONE ENCOUNTER
Refill Request    Medication request: HYDROcodone-acetaminophen (NORCO)  MG Oral Tab  Take 1 tablet by mouth every 6 (six) hours as needed for Pain.     Marcio Walker MD   Due back to clinic per last office note:  \"follow up in 2-3 months\"  NOV: 8/9/2022 Nisreen Vazquez MD      [place]: 5/14/22 #120    Urine drug screen (if applicable): none  Pain contract: Valid until 8/6/22    LOV plan (if weaning or changing medications): no change

## 2022-06-15 RX ORDER — HYDROCODONE BITARTRATE AND ACETAMINOPHEN 10; 325 MG/1; MG/1
1 TABLET ORAL EVERY 6 HOURS PRN
Qty: 120 TABLET | Refills: 0 | Status: SHIPPED | OUTPATIENT
Start: 2022-06-15 | End: 2022-07-15

## 2022-07-11 NOTE — TELEPHONE ENCOUNTER
Narcotic Refill Request    Medication request: HYDROcodone-acetaminophen (NORCO)  MG Oral Tab  Take 1 tablet by mouth every 6 (six) hours as needed for Pain.     LOV: 7/29/21  NOV: 1/6/22     [place]: 11/1/21 #120  Urine drug screen (if appl
Pt calling regarding her refill request  Norco   she states she left a msg last week on the phone line she is almost out of her medication.
11-Jul-2022 22:26

## 2022-07-13 DIAGNOSIS — M54.17 LUMBOSACRAL RADICULOPATHY AT L5: ICD-10-CM

## 2022-07-14 NOTE — TELEPHONE ENCOUNTER
Refill Request    Medication request: HYDROcodone-acetaminophen (NORCO)  MG Oral Tab Take 1 tablet by mouth every 6 (six) hours as needed for Pain.     Roxana Cleveland MD   Due back to clinic per last office note:  \"follow up in 2-3 months\"  NOV: 8/9/2022 Tatiana Arnett MD      [place]: 6/15/22 #120    Urine drug screen (if applicable): none  Pain contract: Expires on 8/6/22    LOV plan (if weaning or changing medications): Per  at [address] [place] [address]: \"I refilled her [place].\"

## 2022-07-15 RX ORDER — HYDROCODONE BITARTRATE AND ACETAMINOPHEN 10; 325 MG/1; MG/1
1 TABLET ORAL EVERY 6 HOURS PRN
Qty: 120 TABLET | Refills: 0 | Status: SHIPPED | OUTPATIENT
Start: 2022-07-15 | End: 2022-08-14

## 2022-07-26 ENCOUNTER — MED REC SCAN ONLY (OUTPATIENT)
Dept: PHYSICAL MEDICINE AND REHAB | Facility: CLINIC | Age: 82
End: 2022-07-26

## 2022-08-09 ENCOUNTER — OFFICE VISIT (OUTPATIENT)
Dept: PHYSICAL MEDICINE AND REHAB | Facility: CLINIC | Age: 82
End: 2022-08-09
Payer: MEDICARE

## 2022-08-09 VITALS
OXYGEN SATURATION: 97 % | HEART RATE: 70 BPM | SYSTOLIC BLOOD PRESSURE: 140 MMHG | DIASTOLIC BLOOD PRESSURE: 90 MMHG | BODY MASS INDEX: 22 KG/M2 | WEIGHT: 115 LBS

## 2022-08-09 DIAGNOSIS — M54.17 LUMBOSACRAL RADICULOPATHY AT L5: Primary | ICD-10-CM

## 2022-08-09 DIAGNOSIS — M43.10 RETROLISTHESIS OF VERTEBRAE: ICD-10-CM

## 2022-08-09 DIAGNOSIS — Z98.1 S/P LUMBAR FUSION: ICD-10-CM

## 2022-08-09 DIAGNOSIS — F43.23 SITUATIONAL MIXED ANXIETY AND DEPRESSIVE DISORDER: ICD-10-CM

## 2022-08-09 DIAGNOSIS — M51.24 HERNIATED THORACIC DISC WITHOUT MYELOPATHY: ICD-10-CM

## 2022-08-09 DIAGNOSIS — M51.9 THORACIC DISC DISEASE: ICD-10-CM

## 2022-08-09 DIAGNOSIS — M43.16 SPONDYLOLISTHESIS OF LUMBAR REGION: ICD-10-CM

## 2022-08-09 DIAGNOSIS — M48.062 SPINAL STENOSIS OF LUMBAR REGION WITH NEUROGENIC CLAUDICATION: ICD-10-CM

## 2022-08-09 DIAGNOSIS — M43.16 SPONDYLOLISTHESIS AT L4-L5 LEVEL: ICD-10-CM

## 2022-08-09 DIAGNOSIS — M48.04 THORACIC STENOSIS: ICD-10-CM

## 2022-08-09 DIAGNOSIS — S22.040D COMPRESSION FRACTURE OF T4 VERTEBRA WITH ROUTINE HEALING: ICD-10-CM

## 2022-08-09 DIAGNOSIS — M48.061 LUMBAR FORAMINAL STENOSIS: ICD-10-CM

## 2022-08-09 DIAGNOSIS — M51.36 BULGING LUMBAR DISC: ICD-10-CM

## 2022-08-09 PROCEDURE — 99214 OFFICE O/P EST MOD 30 MIN: CPT | Performed by: PHYSICAL MEDICINE & REHABILITATION

## 2022-08-09 RX ORDER — HYDROCODONE BITARTRATE AND ACETAMINOPHEN 10; 325 MG/1; MG/1
1 TABLET ORAL EVERY 6 HOURS PRN
Qty: 120 TABLET | Refills: 0 | Status: SHIPPED | OUTPATIENT
Start: 2022-08-14 | End: 2022-09-13

## 2022-08-09 NOTE — PATIENT INSTRUCTIONS
Plan  I will perform bilateral L3 TFESI(s). The patient will follow up in 2-3 months, but the patient will call me 2 weeks after having the injection to let me know how the injection worked. If the bilateral L3 TFESI's do not help her that much, then I will do bilateral L5 TFESI's. The patient will continue with her home exercise program.    She might need formal PT again in the future. She will continue with the [place] for the pain.

## 2022-08-10 ENCOUNTER — TELEPHONE (OUTPATIENT)
Dept: NEUROLOGY | Facility: CLINIC | Age: 82
End: 2022-08-10

## 2022-08-10 NOTE — TELEPHONE ENCOUNTER
Bilateral L3 (L3-4) TFESI CPT CODE: 66190-28,05459    Status: Approved- No pre-certification required. Per Medicare Guidelines; request is a covered benefit and pre-certification is not require. All Medicare coverage is based on Medical Necessity.    Notified nursing  for scheduling

## 2022-08-10 NOTE — TELEPHONE ENCOUNTER
Patient has been scheduled for Bilateral L3 (L3-4) TFESI  on 08/12/22  at the [facility] with . -Anesthesia type: LOCAL. -If receiving MAC or IVC sedation patient will need to get COVID tested 3 days prior even if already vaccinated (order placed by [facility].)  -If scheduling [facility] covid testing required for all procedures whether patient is vaccinated or not. -Patient informed not to eat or drink anything after midnight the night prior to the procedure, if being sedated. -Patient was advised that if he/she does receive the covid vaccine it needs to be at least 2 weeks before or after the injection. -Medications and allergies reviewed. -Patient reminded to hold NSAIDs (Ibuprofen, ASA 81, Aleve, Naproxen, Mobic etc.) for 3 days prior to [place]  if BMI is greater than 35. For Cervical injections only hold multivitamins, Vitamin E, Fish Oil, Phentermine (Lomaira) for 7 days prior to injection and NSAIDS.  mg to be held for 7 days prior to injections.  -If patient is receiving MAC/IVCS Phentermine Curtis Virgen) will need to be held for 7 days prior to injection.  -If on blood thinner clearance has been received to hold this medication by provider.   -Patient informed he/she will need a  to and from procedure. -[facility] is located in the [facility] 1st floor. Patient may park in the yellow parking. Patient verbalized understanding and agrees with plan.  -----> Scheduled in Epic: Yes  -----> Scheduled in Casetabs:  Yes

## 2022-08-12 ENCOUNTER — OFFICE VISIT (OUTPATIENT)
Dept: SURGERY | Facility: CLINIC | Age: 82
End: 2022-08-12

## 2022-08-12 DIAGNOSIS — M54.17 LUMBOSACRAL RADICULOPATHY AT L5: Primary | ICD-10-CM

## 2022-08-12 DIAGNOSIS — M48.062 SPINAL STENOSIS OF LUMBAR REGION WITH NEUROGENIC CLAUDICATION: ICD-10-CM

## 2022-08-12 DIAGNOSIS — Z98.1 S/P LUMBAR FUSION: ICD-10-CM

## 2022-08-12 DIAGNOSIS — M51.36 BULGING LUMBAR DISC: ICD-10-CM

## 2022-08-12 PROCEDURE — 64483 NJX AA&/STRD TFRM EPI L/S 1: CPT | Performed by: PHYSICAL MEDICINE & REHABILITATION

## 2022-08-12 NOTE — PROCEDURES
[facility]    BILATERAL LUMBAR TRANSFORAMINAL   NAME:  Edouard Christie    MR #:    [id number] :  1940     PHYSICIAN:  Shakira Bah MD        Operative Report    DATE OF PROCEDURE: 2022   PREOPERATIVE DIAGNOSES: 1. bilateral L5-S1 radiculopathy    2. L5-S1 mod diffuse, L3-4 mod-large diffuse, L2-3 large diffuse, L1-2 mod diffuse bulging discs    3. L3-4 mod-severe, L2-3 mod-severe, L1-2 mod central stenosis    4. S/P lumbar fusion: L4-5        POSTOPERATIVE DIAGNOSES:   1. bilateral L5-S1 radiculopathy    2. L5-S1 mod diffuse, L3-4 mod-large diffuse, L2-3 large diffuse, L1-2 mod diffuse bulging discs    3. L3-4 mod-severe, L2-3 mod-severe, L1-2 mod central stenosis    4. S/P lumbar fusion: L4-5        PROCEDURES: Bilateral L3 transforaminal epidural steroid injections done under fluoroscopic guidance with contrast enhancement. SURGEON: Shakira Wheatley MD   ANESTHESIA: Local   INDICATIONS:      OPERATIVE PROCEDURE:  Written consent was obtained from the patient. The patient was brought into the operating room and placed in the prone position on the fluoroscopy table with pillow underneath her abdomen. The patient's skin was cleaned and draped in a normal sterile fashion. Using AP fluoroscopy, all five lumbar vertebrae were identified. When the third vertebra was identified, fluoroscopy was left anterior obliqued opening up the right L3-4 intervertebral foramen. At this point in time, the patient's skin was anesthetized with 1% PF lidocaine without epinephrine. Then, a 3.5 inch, 22-gauge spinal needle was inserted and directed towards the right L3-4 intervertebral foramen. When it felt to be in good position, AP fluoroscopy was used to advance the needle to the 6 o'clock position on the right L3 pedicle. At this point in time, Omnipaque-240 contrast was used to obtain a good epidurogram indicating correct needle placement. Then, aspiration was performed.   No blood, fluid, or air was aspirated. Then, the patient was injected with a 3 cc solution of 1.5 cc of 40 mg/cc of Kenalog and 1.5 cc of 1% PF lidocaine without epinephrine. After this, the needle was removed. Then  fluoroscopy was right anterior obliqued opening up the left L3-4 intervertebral foramen. At this point in time, the patient's skin was anesthetized with 1 to 2 cc of 1% PF lidocaine without epinephrine. Then, a 3.5 inch, 22-gauge spinal needle was inserted and directed towards the left L3-4 intervertebral foramen. When it felt to be in good position, AP fluoroscopy was used to advance the needle to the 6 o'clock position on the left L3 pedicle. At this point in time, Omnipaque-240 contrast was used to obtain a good epidurogram indicating correct needle placement. Then, aspiration was performed. No blood, fluid, or air was aspirated. Then, the patient was injected with a 3 cc solution of 1.5 cc of 40 mg/cc of Kenalog and 1.5 cc of 1% PF lidocaine without epinephrine. After this, the needle was removed. The patient's skin was cleaned. Band-Aids were applied. The patient was transferred to the cart and into [facility]. The patient was given discharge instructions and will follow up in the clinic as scheduled. Throughout the whole procedure, the patient's pulse oximetry and vital signs were monitored and they remained completely stable. Also, throughout the whole procedure, prior to injection of any medication, aspiration was performed. No blood, fluid, or air was aspirated at anytime.

## 2022-08-23 ENCOUNTER — TELEPHONE (OUTPATIENT)
Dept: PHYSICAL MEDICINE AND REHAB | Facility: CLINIC | Age: 82
End: 2022-08-23

## 2022-08-23 NOTE — TELEPHONE ENCOUNTER
Pt is calling to speak to a nurse, she would like to give them a condition update.  Please advise-NL

## 2022-09-14 DIAGNOSIS — M54.17 LUMBOSACRAL RADICULOPATHY AT L5: ICD-10-CM

## 2022-09-14 NOTE — TELEPHONE ENCOUNTER
Refill Request    Medication request: HYDROcodone-acetaminophen (NORCO)  MG Oral Tab  Take 1 tablet by mouth every 6 (six) hours as needed for Pain. LOV: 8/9/22   Due back to clinic per last office note:  \"follow up in 2-3 months\"  NOV: 11/14/22     [place]: 8/15/22 #120    Urine drug screen (if applicable): none  Pain contract: Valid until 8/24/23    LOV plan (if weaning or changing medications): Per  at [address] [place] [address]: \"She will continue with the [address] [place] [address],[address] for the pain. \"

## 2022-09-15 RX ORDER — HYDROCODONE BITARTRATE AND ACETAMINOPHEN 10; 325 MG/1; MG/1
1 TABLET ORAL EVERY 6 HOURS PRN
Qty: 120 TABLET | Refills: 0 | Status: SHIPPED | OUTPATIENT
Start: 2022-09-15 | End: 2022-10-15

## 2022-10-19 DIAGNOSIS — M54.17 LUMBOSACRAL RADICULOPATHY AT L5: ICD-10-CM

## 2022-10-20 RX ORDER — HYDROCODONE BITARTRATE AND ACETAMINOPHEN 10; 325 MG/1; MG/1
1 TABLET ORAL EVERY 6 HOURS PRN
Qty: 120 TABLET | Refills: 0 | Status: SHIPPED | OUTPATIENT
Start: 2022-10-20 | End: 2022-10-20

## 2022-10-20 RX ORDER — HYDROCODONE BITARTRATE AND ACETAMINOPHEN 10; 325 MG/1; MG/1
1 TABLET ORAL EVERY 6 HOURS PRN
Qty: 120 TABLET | Refills: 0 | Status: SHIPPED | OUTPATIENT
Start: 2022-10-20 | End: 2022-11-19

## 2022-10-20 NOTE — TELEPHONE ENCOUNTER
Refill Request    Medication request: HYDROcodone-acetaminophen (NORCO)  MG Oral Tab  Take 1 tablet by mouth every 6 (six) hours as needed for Pain. Karson Sanchez MD   Due back to clinic per last office note:  \"follow up in 2-3 months\"  NOV: 11/14/2022 Huber Esposito MD      [place]: 9/18/22 #120    Urine drug screen (if applicable): none  Pain contract: Valid until 8/24/23    LOV plan (if weaning or changing medications): Per  at [address] [place] [address]: \"She will continue with the [address] [place] [address],[address] for the pain. \"

## 2022-11-14 ENCOUNTER — OFFICE VISIT (OUTPATIENT)
Dept: PHYSICAL MEDICINE AND REHAB | Facility: CLINIC | Age: 82
End: 2022-11-14
Payer: MEDICARE

## 2022-11-14 ENCOUNTER — TELEPHONE (OUTPATIENT)
Dept: NEUROLOGY | Facility: CLINIC | Age: 82
End: 2022-11-14

## 2022-11-14 VITALS — WEIGHT: 116 LBS | HEART RATE: 68 BPM | BODY MASS INDEX: 22 KG/M2 | OXYGEN SATURATION: 93 %

## 2022-11-14 DIAGNOSIS — M43.16 SPONDYLOLISTHESIS AT L4-L5 LEVEL: ICD-10-CM

## 2022-11-14 DIAGNOSIS — M51.9 THORACIC DISC DISEASE: ICD-10-CM

## 2022-11-14 DIAGNOSIS — M54.17 LUMBOSACRAL RADICULOPATHY AT L5: Primary | ICD-10-CM

## 2022-11-14 DIAGNOSIS — M51.36 BULGING LUMBAR DISC: ICD-10-CM

## 2022-11-14 DIAGNOSIS — M96.1 LUMBAR POST-LAMINECTOMY SYNDROME: ICD-10-CM

## 2022-11-14 DIAGNOSIS — M51.24 HERNIATED THORACIC DISC WITHOUT MYELOPATHY: ICD-10-CM

## 2022-11-14 DIAGNOSIS — M43.16 SPONDYLOLISTHESIS OF LUMBAR REGION: ICD-10-CM

## 2022-11-14 DIAGNOSIS — S22.040D COMPRESSION FRACTURE OF T4 VERTEBRA WITH ROUTINE HEALING: ICD-10-CM

## 2022-11-14 DIAGNOSIS — M48.062 SPINAL STENOSIS OF LUMBAR REGION WITH NEUROGENIC CLAUDICATION: ICD-10-CM

## 2022-11-14 DIAGNOSIS — S32.050D COMPRESSION FRACTURE OF L5 VERTEBRA WITH ROUTINE HEALING, SUBSEQUENT ENCOUNTER: ICD-10-CM

## 2022-11-14 DIAGNOSIS — M48.04 THORACIC STENOSIS: ICD-10-CM

## 2022-11-14 DIAGNOSIS — M48.061 LUMBAR FORAMINAL STENOSIS: ICD-10-CM

## 2022-11-14 DIAGNOSIS — M43.10 RETROLISTHESIS OF VERTEBRAE: ICD-10-CM

## 2022-11-14 DIAGNOSIS — Z96.642 STATUS POST LEFT HIP REPLACEMENT: ICD-10-CM

## 2022-11-14 DIAGNOSIS — Z98.1 S/P LUMBAR FUSION: ICD-10-CM

## 2022-11-14 RX ORDER — HYDROCODONE BITARTRATE AND ACETAMINOPHEN 10; 325 MG/1; MG/1
1 TABLET ORAL EVERY 6 HOURS PRN
Qty: 120 TABLET | Refills: 0 | Status: SHIPPED | OUTPATIENT
Start: 2022-11-19 | End: 2022-12-19

## 2022-11-15 NOTE — TELEPHONE ENCOUNTER
Per Medicare guidelines authorization is not required for Bilateral L5 TFESI cpt codes 81629-76, 92534. Will inform Nursing.

## 2022-11-15 NOTE — TELEPHONE ENCOUNTER
Patient has been scheduled for Bilateral L5 TFESI  on 11/18/22 at the [address] [place] [address] with [address]. -Anesthesia type: LOCAL. -If scheduling [facility] covid testing required for all procedures whether patient is vaccinated or not. -Patient informed not to eat or drink anything after midnight the night prior to the procedure, if being sedated. (Patient informed to fast 12 hours prior to procedure with IVCS/MAC. )  -Patient was advised that if he/she does receive the covid vaccine it needs to be at least 2 weeks before or after the injection. -Medications and allergies reviewed. -Patient reminded to hold NSAIDs (Ibuprofen, ASA 81, Aleve, Naproxen, Mobic, Diclofenac, Etodolac, Celebrex etc.) for 3 days prior to [facility]  if BMI is greater than 35. For Cervical injections only hold multivitamins, Vitamin E, Fish Oil, Phentermine (Lomaira) for 7 days prior to injection and NSAIDS.  mg to be held for 7 days prior to injections.  -If patient is receiving MAC/IVCS Phentermine Magdalen Asiya) will need to be held for 7 days prior to injection.  -If on blood thinner clearance has been received to hold this medication by provider.   -Patient informed he/she will need a  to and from procedure. -[facility] is located in the [facility] 1st floor. Patient may park in the yellow parking. Patient verbalized understanding and agrees with plan.  -----> Scheduled in Epic: Yes  -----> Scheduled in Casetabs:  Yes

## 2022-11-15 NOTE — PATIENT INSTRUCTIONS
Plan  I will perform bilateral L5 TFESI(s). If the above does not help, then I will look into doing bilateral L5-S1 z-joint injections versus bilateral L4 and L5 medial branch blocks. She would like to do the PT for the lumbar spine again with Amish Goldman. She will continue with the [place] for the pain. The patient will follow up in 2-3 months, but the patient will call me 2 weeks after having the injection to let me know how the injection worked.

## 2022-11-18 ENCOUNTER — OFFICE VISIT (OUTPATIENT)
Dept: SURGERY | Facility: CLINIC | Age: 82
End: 2022-11-18
Payer: MEDICARE

## 2022-11-18 DIAGNOSIS — M48.061 LUMBAR FORAMINAL STENOSIS: ICD-10-CM

## 2022-11-18 DIAGNOSIS — M51.36 BULGING LUMBAR DISC: ICD-10-CM

## 2022-11-18 DIAGNOSIS — M54.17 LUMBOSACRAL RADICULOPATHY AT L5: Primary | ICD-10-CM

## 2022-11-18 DIAGNOSIS — M96.1 LUMBAR POST-LAMINECTOMY SYNDROME: ICD-10-CM

## 2022-11-18 PROCEDURE — 64483 NJX AA&/STRD TFRM EPI L/S 1: CPT | Performed by: PHYSICAL MEDICINE & REHABILITATION

## 2022-11-18 NOTE — PROCEDURES
[facility]    BILATERAL LUMBAR TRANSFORAMINAL   NAME:  Mary Dominguez    MR #:    [id number] :  1940     PHYSICIAN:  Deshaun Adams MD        Operative Report    DATE OF PROCEDURE: 2022   PREOPERATIVE DIAGNOSES: 1. bilateral L5-S1 radiculopathy    2. L5-S1 mod diffuse, L3-4 mod-large diffuse, L2-3 large diffuse, L1-2 mod diffuse bulging discs    3. Lumbar post-laminectomy syndrome: L4-5    4. L5-S1 bilateral moderate, L3-4 left severe, L2-3 right moderate-severe foraminal stenosis        POSTOPERATIVE DIAGNOSES:   1. bilateral L5-S1 radiculopathy    2. L5-S1 mod diffuse, L3-4 mod-large diffuse, L2-3 large diffuse, L1-2 mod diffuse bulging discs    3. Lumbar post-laminectomy syndrome: L4-5    4. L5-S1 bilateral moderate, L3-4 left severe, L2-3 right moderate-severe foraminal stenosis        PROCEDURES: Bilateral L5 transforaminal epidural steroid injections done under fluoroscopic guidance with contrast enhancement. SURGEON: Deshaun Wheatley MD   ANESTHESIA: Local   INDICATIONS:      OPERATIVE PROCEDURE:  Written consent was obtained from the patient. The patient was brought into the operating room and placed in the prone position on the fluoroscopy table with pillow underneath her abdomen. The patient's skin was cleaned and draped in a normal sterile fashion. Using AP fluoroscopy, all five lumbar vertebrae were identified. When the fifth vertebra was identified, fluoroscopy was left anterior obliqued opening up the right L5-S1 intervertebral foramen. At this point in time, the patient's skin was anesthetized with 1% PF lidocaine without epinephrine. Then, a 3.5 inch, 22-gauge spinal needle was inserted and directed towards the right L5-S1 intervertebral foramen. When it felt to be in good position, AP fluoroscopy was used to advance the needle to the 6 o'clock position on the right L5 pedicle.   At this point in time, Omnipaque-240 contrast was used to obtain a good epidurogram indicating correct needle placement. Then, aspiration was performed. No blood, fluid, or air was aspirated. Then, the patient was injected with a 3 cc solution of 1.5 cc of 40 mg/cc of Kenalog and 1.5 cc of 1% PF lidocaine without epinephrine. After this, the needle was removed. Then  fluoroscopy was right anterior obliqued opening up the left L5-S1 intervertebral foramen. At this point in time, the patient's skin was anesthetized with 1 to 2 cc of 1% PF lidocaine without epinephrine. Then, a 3.5 inch, 22-gauge spinal needle was inserted and directed towards the left L5-S1 intervertebral foramen. When it felt to be in good position, AP fluoroscopy was used to advance the needle to the 6 o'clock position on the left L5 pedicle. At this point in time, Omnipaque-240 contrast was used to obtain a good epidurogram indicating correct needle placement. Then, aspiration was performed. No blood, fluid, or air was aspirated. Then, the patient was injected with a 3 cc solution of 1.5 cc of 40 mg/cc of Kenalog and 1.5 cc of 1% PF lidocaine without epinephrine. After this, the needle was removed. The patient's skin was cleaned. Band-Aids were applied. The patient was transferred to the cart and into [facility]. The patient was given discharge instructions and will follow up in the clinic as scheduled. Throughout the whole procedure, the patient's pulse oximetry and vital signs were monitored and they remained completely stable. Also, throughout the whole procedure, prior to injection of any medication, aspiration was performed. No blood, fluid, or air was aspirated at anytime.

## 2022-12-05 ENCOUNTER — TELEPHONE (OUTPATIENT)
Dept: PHYSICAL MEDICINE AND REHAB | Facility: CLINIC | Age: 82
End: 2022-12-05

## 2022-12-05 DIAGNOSIS — M47.816 LUMBAR FACET ARTHROPATHY: Primary | ICD-10-CM

## 2022-12-05 NOTE — TELEPHONE ENCOUNTER
Condition update after Procedure. - Patient had Bilateral L5 transforaminal epidural steroid injections on 11/18/22 with Chichi Spaulding. - 45-50% relief. - LOV: 11/14/2022 Aries Quiñones MD    - NOV: 2/16/2023 Darleen Corley MD   - Plan from [address] [place] [address]: Per Cihchi Spaulding: \"I will perform bilateral L5 TFESI(s). If the above does not help, then I will look into doing bilateral L5-S1 z-joint injections versus bilateral L4 and L5 medial branch blocks. She would like to do the PT for the lumbar spine again with Ruben Jeffries. She will continue with the [place] for the pain. The patient will follow up in 2-3 months, but the patient will call me 2 weeks after having the injection to let me know how the injection worked. \"    Patient stated the injection helped her low back pain 45-50%, but the middle of her back still feels the same  Patient stated she will call to schedule PT sessions, but she cannot start this until after [place]. Informed patient update will be relayed to Chichi Spaulding for next steps. Once recommendations are received, will reach back out to her. Patient was appreciative.

## 2022-12-06 ENCOUNTER — TELEPHONE (OUTPATIENT)
Dept: NEUROLOGY | Facility: CLINIC | Age: 82
End: 2022-12-06

## 2022-12-06 PROBLEM — M47.816 LUMBAR FACET ARTHROPATHY: Status: ACTIVE | Noted: 2022-12-06

## 2022-12-06 NOTE — TELEPHONE ENCOUNTER
Bilateral L4 and L5 medial branch blocks CPT CODE: [id number] RT 61871 LT  Status: Authorization is not required per health plan however, may be subject to review once claim is submitted. Per Medicare Guidelines:pre-certification is not require. All Medicare coverage is based on Medical Necessity. Notified nusing for scheduling   FYI: Per CMS Guidelines: One to two levels, either unilateral or bilateral, are allowed per session per spine region. The need for a three or four-level procedure bilaterally may be considered under unique circumstances and with sufficient documentation of medical necessity on appeal. A session is a time period, which includes all procedures (i.e., medial branch block (MBB), intraarticular injections (IA), facet cyst ruptures, and RFA ablations that are performed during the same day.

## 2022-12-08 NOTE — TELEPHONE ENCOUNTER
Patient has been scheduled for Bilateral L4 and L5 medial branch blocks  on 1/3/23 at the [facility] with Valentino Deaner. -Anesthesia type: Local.  -Patient informed to fast 12 hours prior to procedure with IVCS/MAC.   -Scheduling [facility] covid testing required for all procedures whether patient is vaccinated or not. -Patient was advised that if he/she does receive the covid vaccine it needs to be at least 2 weeks before or after the injection. -Medications and allergies reviewed. -Patient reminded to hold NSAIDs (Ibuprofen, ASA 81, Aleve, Naproxen, Mobic, Diclofenac, Etodolac, Celebrex etc.) for 3 days prior to Lumbar MBB/Facet if BMI is greater than 35. For Cervical injections only hold multivitamins, Vitamin E, Fish Oil, Phentermine/Lomaira for 7 days prior to injection and NSAIDS.  mg to be held for 7 days prior to injections.  -If patient is receiving MAC/IVCS Phentermine [place]) will need to be held for 7 days prior to injection.  -If on blood thinner clearance has been received to hold this medication by provider.   -Patient informed he/she will need a  to and from procedure. [facility] is located in the [facility] [address] 1st floor,  may park in the yellow/purple parking lot. Patient verbalized understanding and agrees with plan.  -----> Scheduled in Epic: Yes  -----> Scheduled in Casetabs:  Yes

## 2022-12-21 DIAGNOSIS — M54.17 LUMBOSACRAL RADICULOPATHY AT L5: ICD-10-CM

## 2022-12-21 NOTE — TELEPHONE ENCOUNTER
Refill Request    Medication request: HYDROcodone-acetaminophen (NORCO)  MG Oral Tab  Take 1 tablet by mouth every 6 (six) hours as needed for Pain. Jonny Nascimento MD   Due back to clinic per last office note:  \"follow up in 2-3 months\"   NOV: 2/16/2023 Timoteo Pan MD      [place]: 11/22/22 #120    Urine drug screen (if applicable): none  Pain contract: Valid until 8/24/23    LOV plan (if weaning or changing medications): Per  at [address] [place] [address]: \"[place] 3 and a half to 4 tabs daily\" \"She will continue with the [address] [place] [address],[address] for the pain. \"

## 2022-12-22 RX ORDER — HYDROCODONE BITARTRATE AND ACETAMINOPHEN 10; 325 MG/1; MG/1
1 TABLET ORAL EVERY 6 HOURS PRN
Qty: 120 TABLET | Refills: 0 | Status: SHIPPED | OUTPATIENT
Start: 2022-12-22 | End: 2023-01-21

## 2023-01-03 ENCOUNTER — OFFICE VISIT (OUTPATIENT)
Dept: SURGERY | Facility: CLINIC | Age: 83
End: 2023-01-03
Payer: MEDICARE

## 2023-01-03 DIAGNOSIS — M47.816 LUMBAR FACET ARTHROPATHY: Primary | ICD-10-CM

## 2023-01-03 DIAGNOSIS — Z98.1 S/P LUMBAR FUSION: ICD-10-CM

## 2023-01-03 PROCEDURE — 64493 INJ PARAVERT F JNT L/S 1 LEV: CPT | Performed by: PHYSICAL MEDICINE & REHABILITATION

## 2023-01-03 NOTE — PROCEDURES
[address]    LUMBAR MEDIAL BRANCH BLOCK   NAME:  Renae Laws    MR #:    [id number] :  1940     PHYSICIAN:  Mejia Francois MD        Operative Report    DATE OF PROCEDURE: 1/3/2023   PREOPERATIVE DIAGNOSES: 1. Lumbar facet arthropathy    2. S/P lumbar fusion: L4-5        POSTOPERATIVE DIAGNOSES:   1. Lumbar facet arthropathy    2. S/P lumbar fusion: L4-5        PROCEDURES: bilateral L4 and L5 Medial Branch Block done under fluoroscopic guidance with contrast enhancement. SURGEON: Mejia Wheatley MD   ANESTHESIA: Local   INDICATIONS:      OPERATIVE PROCEDURE:  Written consent was obtained from the patient. The patient was brought into the operating room and placed in the prone position on the fluoroscopy table with pillow underneath her abdomen. The patient's skin was cleaned and draped in a normal sterile fashion. Using AP fluoroscopy, all five lumbar vertebrae were identified. When the fifth vertebrae were identified, marks were placed on the patient's skin overlying were the bilateral transverse process meets the lamina at these levels. Also, fluoroscopy was used to identify  bilateral sacral ala. At this point in time, the patient's skin was anesthetized with 1% PF lidocaine without epinephrine overlying each needle site. Then, a 22 gauge needle was inserted and directed to the above stated sites. When they felt to be in good position, oblique fluoroscopy was used to confirm needle placement at the eye of the Andrés dog each level. At this time, Omnipaque 240 contrast was used to obtain a good fascial pattern at each site. Aspiration was performed and no blood, fluid, or air were aspirated. Then, each site was injected with 0.5cc of 0.5% PF marcaine without epinephrine. After this, the needles were removed. The patient's skin was cleaned. Band-Aids were applied. The patient was transferred to the cart and into [facility].   The patient was given discharge instructions and will follow up in the clinic as scheduled. Throughout the whole procedure, the patient's pulse oximetry and vital signs were monitored and they remained completely stable. Also, throughout the whole procedure, prior to injection of any medication, aspiration was performed. No blood, fluid, or air was aspirated at anytime.

## 2023-01-04 ENCOUNTER — TELEPHONE (OUTPATIENT)
Dept: PHYSICAL MEDICINE AND REHAB | Facility: CLINIC | Age: 83
End: 2023-01-04

## 2023-01-04 ENCOUNTER — TELEPHONE (OUTPATIENT)
Dept: NEUROLOGY | Facility: CLINIC | Age: 83
End: 2023-01-04

## 2023-01-04 DIAGNOSIS — M47.816 LUMBAR FACET ARTHROPATHY: Primary | ICD-10-CM

## 2023-01-04 NOTE — TELEPHONE ENCOUNTER
Received a call from patient to inform Antoineyannick done 1/03/2023 : There is no pain but feels sore and has been applying some ice . Patient did take some Hydrocodone at night for her back pain.   Please advise

## 2023-01-04 NOTE — TELEPHONE ENCOUNTER
Patient had bilateral L4 and L5 Medial Branch Block on 1/3/23. Spoke with patient who stated she is having some achy/soreness at the injection site, but overall she has had about 70% pain relief from the injection. Informed patient update would be relayed to [address] for next steps. Patient understood and was thankful. Update relayed to [address] Awaiting recommendations.

## 2023-01-04 NOTE — TELEPHONE ENCOUNTER
Attempted to speak with patient. Call continued to ring with no option to leave a message. Need to review plan below from [address] [place] [address] with patient. MBB repeat injection order placed.

## 2023-01-05 NOTE — TELEPHONE ENCOUNTER
Per Medicare Guidelines -no authorization is required for Bilateral L4 and L5 medial branch blocks CPT [id number]     Status: Authorization is not required however may be subject to review once claim is submitted-Covered Benefit

## 2023-01-09 ENCOUNTER — OFFICE VISIT (OUTPATIENT)
Dept: PHYSICAL THERAPY | Facility: HOSPITAL | Age: 83
End: 2023-01-09
Attending: PHYSICAL MEDICINE & REHABILITATION
Payer: MEDICARE

## 2023-01-09 DIAGNOSIS — M54.17 LUMBOSACRAL RADICULOPATHY AT L5: ICD-10-CM

## 2023-01-09 DIAGNOSIS — M96.1 LUMBAR POST-LAMINECTOMY SYNDROME: ICD-10-CM

## 2023-01-09 DIAGNOSIS — M48.062 SPINAL STENOSIS OF LUMBAR REGION WITH NEUROGENIC CLAUDICATION: ICD-10-CM

## 2023-01-09 DIAGNOSIS — M48.061 LUMBAR FORAMINAL STENOSIS: ICD-10-CM

## 2023-01-09 DIAGNOSIS — M43.10 RETROLISTHESIS OF VERTEBRAE: ICD-10-CM

## 2023-01-09 DIAGNOSIS — M51.36 BULGING LUMBAR DISC: ICD-10-CM

## 2023-01-09 DIAGNOSIS — M43.16 SPONDYLOLISTHESIS OF LUMBAR REGION: ICD-10-CM

## 2023-01-09 DIAGNOSIS — S22.040D COMPRESSION FRACTURE OF T4 VERTEBRA WITH ROUTINE HEALING: ICD-10-CM

## 2023-01-09 DIAGNOSIS — S32.050D COMPRESSION FRACTURE OF L5 VERTEBRA WITH ROUTINE HEALING, SUBSEQUENT ENCOUNTER: ICD-10-CM

## 2023-01-09 DIAGNOSIS — Z98.1 S/P LUMBAR FUSION: ICD-10-CM

## 2023-01-09 DIAGNOSIS — M43.16 SPONDYLOLISTHESIS AT L4-L5 LEVEL: ICD-10-CM

## 2023-01-09 PROCEDURE — 97110 THERAPEUTIC EXERCISES: CPT

## 2023-01-09 PROCEDURE — 97162 PT EVAL MOD COMPLEX 30 MIN: CPT

## 2023-01-13 ENCOUNTER — OFFICE VISIT (OUTPATIENT)
Dept: SURGERY | Facility: CLINIC | Age: 83
End: 2023-01-13
Payer: MEDICARE

## 2023-01-13 DIAGNOSIS — Z98.1 S/P LUMBAR FUSION: ICD-10-CM

## 2023-01-13 DIAGNOSIS — M47.816 ARTHRITIS OF FACET JOINT OF LUMBAR SPINE: Primary | ICD-10-CM

## 2023-01-13 DIAGNOSIS — M96.1 LUMBAR POST-LAMINECTOMY SYNDROME: ICD-10-CM

## 2023-01-13 PROCEDURE — 64493 INJ PARAVERT F JNT L/S 1 LEV: CPT | Performed by: PHYSICAL MEDICINE & REHABILITATION

## 2023-01-13 NOTE — PROCEDURES
[address]    LUMBAR MEDIAL BRANCH BLOCK   NAME:  Bartolo Gallegos    MR #:    [id number] :  1940     PHYSICIAN:  Maximus Lopez MD        Operative Report    DATE OF PROCEDURE: 2023   PREOPERATIVE DIAGNOSES: 1. Arthritis of facet joint of lumbar spine    2. S/P lumbar fusion: L4-5    3. Lumbar post-laminectomy syndrome: L4-5        POSTOPERATIVE DIAGNOSES:   1. Arthritis of facet joint of lumbar spine    2. S/P lumbar fusion: L4-5    3. Lumbar post-laminectomy syndrome: L4-5        PROCEDURES: bilateral L4 and L5 Medial Branch Block done under fluoroscopic guidance with contrast enhancement. SURGEON: Maximus Wheatley MD   ANESTHESIA: Local   INDICATIONS:      OPERATIVE PROCEDURE:  Written consent was obtained from the patient. The patient was brought into the operating room and placed in the prone position on the fluoroscopy table with pillow underneath her abdomen. The patient's skin was cleaned and draped in a normal sterile fashion. Using AP fluoroscopy, all five lumbar vertebrae were identified. When the fifth vertebrae were identified, marks were placed on the patient's skin overlying were the bilateral transverse process meets the lamina at these levels. Also, fluoroscopy was used to identify  bilateral sacral ala. At this point in time, the patient's skin was anesthetized with 1% PF lidocaine without epinephrine overlying each needle site. Then, a 22 gauge needle was inserted and directed to the above stated sites. When they felt to be in good position, oblique fluoroscopy was used to confirm needle placement at the eye of the Andrés dog each level. At this time, Omnipaque 240 contrast was used to obtain a good fascial pattern at each site. Aspiration was performed and no blood, fluid, or air were aspirated. Then, each site was injected with 0.5cc of 0.5% PF marcaine without epinephrine. After this, the needles were removed. The patient's skin was cleaned. Band-Aids were applied. The patient was transferred to the cart and into [facility]. The patient was given discharge instructions and will follow up in the clinic as scheduled. Throughout the whole procedure, the patient's pulse oximetry and vital signs were monitored and they remained completely stable. Also, throughout the whole procedure, prior to injection of any medication, aspiration was performed. No blood, fluid, or air was aspirated at anytime.

## 2023-01-16 ENCOUNTER — TELEPHONE (OUTPATIENT)
Dept: PHYSICAL MEDICINE AND REHAB | Facility: CLINIC | Age: 83
End: 2023-01-16

## 2023-01-16 DIAGNOSIS — M47.816 LUMBAR FACET ARTHROPATHY: Primary | ICD-10-CM

## 2023-01-16 NOTE — TELEPHONE ENCOUNTER
Patient had bilateral L4 and L5 Medial Branch Block on 1/13/23.  -50-60% relief. Spoke with patient who stated this block was not as helpful as the first one. States she has 50-60% relief. Felt a little achy on Friday, but felt pretty good on Saturday. States she used less pain medication Friday, Saturday and Sunday, but the pain did start to come back last night. Informed patient update will be relayed on to  to confirm next steps. Patient understood and was appreciative. Update relayed to [address] Awaiting recommendations. RFN order pended.

## 2023-01-17 ENCOUNTER — TELEPHONE (OUTPATIENT)
Dept: NEUROLOGY | Facility: CLINIC | Age: 83
End: 2023-01-17

## 2023-01-17 ENCOUNTER — OFFICE VISIT (OUTPATIENT)
Dept: PHYSICAL THERAPY | Facility: HOSPITAL | Age: 83
End: 2023-01-17
Attending: PHYSICAL MEDICINE & REHABILITATION
Payer: MEDICARE

## 2023-01-17 PROCEDURE — 97110 THERAPEUTIC EXERCISES: CPT

## 2023-01-17 PROCEDURE — 97112 NEUROMUSCULAR REEDUCATION: CPT

## 2023-01-17 NOTE — TELEPHONE ENCOUNTER
bilateral L4 and L5 Medial Branch radiofrequency neurotomies   CPT CODE:  [id number], [id number] RT, [id number] LT  Status: Pre-certification is not require. Per Medicare Guidelines: may be subject to review once claim is submitted. All Medicare coverage is based on Medical Necessity. Notified nusing for scheduling   FYI: Per CMS Guidelines: One to two levels, either unilateral or bilateral, are allowed per session per spine region. The need for a three or four-level procedure bilaterally may be considered under unique circumstances and with sufficient documentation of medical necessity on appeal. A session is a time period, which includes all procedures (i.e., medial branch block (MBB), intraarticular injections (IA), facet cyst ruptures, and RFA ablations that are performed during the same day.

## 2023-01-23 NOTE — TELEPHONE ENCOUNTER
Patient has been scheduled for bilateral L4 and L5 Medial Branch radiofrequency neurotomies  on 1/27/2 at the [address] [place] [address] with Dr. Oral Hollis. -Anesthesia type: IVCS. -Patient informed to fast 12 hours prior to procedure with IVCS/MAC.   -Scheduling [facility] covid testing required for all procedures whether patient is vaccinated or not. -Patient was advised that if he/she does receive the covid vaccine it needs to be at least 2 weeks before or after the injection. -Medications and allergies reviewed. -Patient reminded to hold NSAIDs (Ibuprofen, ASA 81, Aleve, Naproxen, Mobic, Diclofenac, Etodolac, Celebrex etc.) for 3 days prior to Lumbar MBB/Facet if BMI is greater than 35. For Cervical injections only hold multivitamins, Vitamin E, Fish Oil, Phentermine/Lomaira for 7 days prior to injection and NSAIDS.  mg to be held for 7 days prior to injections.  -If patient is receiving MAC/IVCS Phentermine Flo Revels) will need to be held for 7 days prior to injection.  -If on blood thinner clearance has been received to hold this medication by provider.   -Patient informed he/she will need a  to and from procedure. Adrianna Castillo is located in the [facility] [address] 1[address] floor,  may park in the yellow/purple parking lot. Patient verbalized understanding and agrees with plan.  -----> Scheduled in Epic: Yes  -----> Scheduled in [employer]:  Yes

## 2023-01-24 ENCOUNTER — OFFICE VISIT (OUTPATIENT)
Dept: PHYSICAL THERAPY | Facility: HOSPITAL | Age: 83
End: 2023-01-24
Attending: PHYSICAL MEDICINE & REHABILITATION
Payer: MEDICARE

## 2023-01-24 PROCEDURE — 97110 THERAPEUTIC EXERCISES: CPT

## 2023-01-24 PROCEDURE — 97112 NEUROMUSCULAR REEDUCATION: CPT

## 2023-01-24 NOTE — PROGRESS NOTES
Alf Rodriguez    2/25/1940  Referring Physician:  Micah Christiansen  Diagnosis: Lumbosacral radiculopathy at L5 (M54.17)  Compression fracture of L5 vertebra with routine healing, subsequent encounter (S32.050D)  Compression fracture of T4 vertebra with routine healing (S22.040D)  Spinal stenosis of lumbar region with neurogenic claudication (M48.062)  Lumbar foraminal stenosis (M48.061)  Bulging lumbar disc (M51.36)  S/P lumbar fusion (Z98.1)  Retrolisthesis of vertebrae (M43.10)  Spondylolisthesis of lumbar region (M43.16)  Spondylolisthesis at L4-L5 level (M43.16)  Lumbar post-laminectomy syndrome (M96.1)    Initial Evaluation Date: 1/9/2023  Date of Surgery: None for this episode of care    Insurance type and authorized visits: Medicare, NA  Plan of care expires: For orders: 4/9/2023; For insurance: 1/9/2023    Precautions/Hx: 12/2019 L4-5 fusion, L hip partial replacement, 1/3/2023 L4-5 medial branch blocks, 2/11/2022 & 8/12/2022 B L3 TFESI,    SUBJECTIVE:     Pt reports that she has an ablation scheduled for Friday. Pt notes that she just feels stiff and sore in multiple joints which she noticed when getting up to go to the bathroom in the middle of the night.       Visit count 4/9/2023 1/8 2/8 3/8   Date 1/9/2023 1/17/2023 1/24/2023    LBP      Pain Range 3 to 9/10 3-8/10 3-8/10   Pain Ave 6/10 5-6/10 5-6/10      OBJECTIVE:     Treatment performed this date:    Therapeutic Exercise:  Visit #   1/8 2/8 3/8   Position Exercise HEP 1/9/2023 1/17/2023 1/24/2023    Supine Sciatic nerve glides H X x     Cross leg piriformis H X x     SKC H   X 5x    DKC H   X 5x    LTR H   X 10s    Bridging    X 10x    Bridging w toes up    X 10x    Bridging w feet together    X 10x    Glut sets w legs extended    X not tolerated    Glut sets w knees on 6\" bolster    X 10x   Sitting Leg pump nerve glides H X X 10x     Piriformis stretch H X X 3x10s     Functional squat H  X 10x from 23\", unable at 22\"    Neuro Re-ed   X see assessment  X see assessment    H = HEP. Pt given copies of this exercise for home program.  X = Exercises done this date - pt verbalized understanding and demonstrated competence. All exercises done B unless otherwise indicated. Pt advised to discontinue exercises that increase pain and to call or return to therapist to discuss. Each intervention above is specifically prescribed to address the patients identified impairments, activity limitations, and participation restrictions. ASSESSMENT:     Pt educated on stenosis concepts w review of central canal, foramina, facet arthropathy, ligamentum flavum, disc anatomy and behavior. Pt shown models, diagrams, demonstration with discussion and verbalized understanding. Pt did well with supine flexion based exercises. Pt educated on avoidance of seated or standing flexion exercises or activities to avoid anterior vertebral loading due to osteoporosis dx. Pt noted quad tension w bridging. Able to progress to mild balance challenge. Pt asking good clarifying questions regarding her care, HEP and upcoming procedure. Physical Therapy Goals: From 1/9/2023 to 4/9/2023  - Created with patient input during initial evaluation   1. Pt will be independent in beginning level of HEP for stretching, posture and strengthening. 2. Pt will be able to stand to make a light meal without increased symptoms. 3. Pt will be able to carry light to mod laundry basket without increased symptoms. 4. Pt will be able to return to exercise on bike without increased symptoms. 5. Pt will be able to transfer sit <> stand without increased symptoms. PLAN OF CARE:      Continue PT per original plan for therapeutic exercises, posture retraining, therapeutic activities, manual treatment, neuromuscular reeducation, therapeutic pain neuroscience education, patient education, self care home management, home exercise program, and modalities as needed.     Charges: Neuro, TE2    Total Timed treatment: 45 min      Total Treatment Time: 45 min     __________________________________________________________________________________________      21st Century Cures Act Notice to Patient: Medical documents like this are made available to patients in the interest of transparency. However, be advised this is a medical document and it is intended as [id number] communication between your medical providers. This medical document may contain abbreviations, assessments, medical data, and results or other terms that are unfamiliar. Medical documents are intended to carry relevant information, facts as evident, and the clinical opinion of the practitioner. As such, this medical document may be written in language that appears blunt or direct. You are encouraged to contact your medical provider and/or [facility] if you have any questions about this medical document.   Objective Initial Evaluation Data 1/9/2023:    Functional Scores 1/9/2023   FOTO primary measure 47     Gait:        Deviations: trunk flex, min L>R compensated Trendelenburg        Devices: none       Assistance: none    Posture 1/9/2023   Alignment Min B pes cavus, min increased thoracic kyphosis, forward head position mild protracted shoulders,        Sensation 1/9/2023   Dermatomes Light Touch    Proximal medial thigh R (L2)  wnl   Proximal medial thigh L (L2) wnl   Above knee R (L3) wnl   Above knee L (L3) wnl   Medial arch R(L4) wnl   Medial arch L (L4) wnl   Between 1st - 2nd toes R (L5) wnl   Between 1st - 2nd toes L (L5) wnl   Lateral border of foot R (S1) wnl   Lateral border of foot L (S1) wnl   Popliteal fossa R (S2) wnl   Popliteal fossa L(S2) wnl       Abdominals 1/9/2023   Tone  low     Lumbopelvic 1/9/2023   Control  Poor due to lack of mobility       ROM Lumbar 1/9/2023   Flexion Min loss upper L, lower limit wnl for fusion   Extension Mod-max upper lumbar   R SB min   L SB min   R Rotation Mod-max   L Rotation mod   * pain limiting    ROM Hip 1/9/2023   Flex R 125 114   Flex L 125 116   ER R 45 53   ER L 45 48   IR R 40 25   IR L 40 30   *pain limiting     MMT 5/5 1/9/2023   L2- hip flex R 4*   Hip flex L 4*   L3- knee ext R 5   Knee ext L 5   L4- ankle DF R 5   Ankle DF L 4+   L5- EHL R 5-   EHL L 5   S1- ankle PF R 3+ calf atrophy   Ankle PF L 5-   S2- Hams R 4+   Hams L 5   *pain limiting    LE flexibility 1/9/2023   Piriformis L mod   Piriformis R Min-mod   Hams L -31   Hams R -32   *pain limiting    Neural mobility 1/9/2023   SLR R 60 deg myofascial only   SLR L 60 deg myofascial only      SYLVIA 1/9/2023   R Min-mod loss ROM   L Min-mod loss ROM

## 2023-01-27 ENCOUNTER — OFFICE VISIT (OUTPATIENT)
Dept: SURGERY | Facility: CLINIC | Age: 83
End: 2023-01-27
Payer: MEDICARE

## 2023-01-27 DIAGNOSIS — M47.816 LUMBAR FACET ARTHROPATHY: Primary | ICD-10-CM

## 2023-01-27 DIAGNOSIS — M47.816 ARTHRITIS OF FACET JOINT OF LUMBAR SPINE: ICD-10-CM

## 2023-01-27 DIAGNOSIS — Z98.1 S/P LUMBAR FUSION: ICD-10-CM

## 2023-01-27 PROCEDURE — 64635 DESTROY LUMB/SAC FACET JNT: CPT | Performed by: PHYSICAL MEDICINE & REHABILITATION

## 2023-01-27 PROCEDURE — 99153 MOD SED SAME PHYS/QHP EA: CPT | Performed by: PHYSICAL MEDICINE & REHABILITATION

## 2023-01-27 PROCEDURE — 99152 MOD SED SAME PHYS/QHP 5/>YRS: CPT | Performed by: PHYSICAL MEDICINE & REHABILITATION

## 2023-01-27 NOTE — PROCEDURES
[address]   NAME:  Santhosh Valencia    MR #:    [id number] :  1940     PHYSICIAN:  Alex Willams MD        Operative Report    DATE OF PROCEDURE: 2023   PREOPERATIVE DIAGNOSES: 1. Lumbar facet arthropathy    2. Arthritis of facet joint of lumbar spine    3. S/P lumbar fusion: L4-5        POSTOPERATIVE DIAGNOSES:   1. Lumbar facet arthropathy    2. Arthritis of facet joint of lumbar spine    3. S/P lumbar fusion: L4-5        PROCEDURES: bilateral L4 and L5 Medial Branch Radiofrequency Neurotomy done under fluoroscopic guidance with contrast enhancement. SURGEON: Alex Wheatley MD   ANESTHESIA: IVCS   INDICATIONS:      OPERATIVE PROCEDURE:  Written consent was obtained from the patient. The patient was brought into the operating room and placed in the prone position on the fluoroscopy table with pillow underneath her abdomen. The patient's skin was cleaned and draped in a normal sterile fashion. Using AP fluoroscopy, all five lumbar vertebrae were identified. When the fifth vertebrae were identified, marks were placed on the patient's skin overlying where the bilateral transverse process meets the lamina at these levels. Also, bilateral anterior-oblique fluoroscopy was used to identify  bilateral sacral ala. At this point in time, the patient's skin was anesthetized with 1% PF lidocaine without epinephrine overlying each needle site. Then, 18 gauge radiofrequency needles with 10mm exposed curved tips were inserted and directed to the above stated sites. When they felt to be in good position, oblique fluoroscopy was used to confirm needle placement at the eye of the Andrés dog each level. At this point in time, sensory and motor stimulation was performed. When there was a good sensory and motor response or no adverse response, each needle tip was heated to 80 degrees Celsius for 90 seconds.  Prior to the lesioning, each site was anesthetized with 1 ml of 2% PF lidocaine without epinephrine. After this, the needles were removed. The patient's skin was cleaned. Band-Aids were applied. The patient was transferred to the cart and into [facility]. The patient was given discharge instructions and will follow up in the clinic as scheduled. Throughout the whole procedure, the patient's pulse oximetry and vital signs were monitored and they remained completely stable. Also, throughout the whole procedure, prior to injection of any medication, aspiration was performed. No blood, fluid, or air was aspirated at anytime. The total time for conscious sedation was 34 minutes and the total medication used was 75 micrograms of IV Fentanyl and 2 mg of IV Versaid.

## 2023-01-30 ENCOUNTER — TELEPHONE (OUTPATIENT)
Dept: PHYSICAL THERAPY | Age: 83
End: 2023-01-30

## 2023-01-31 ENCOUNTER — APPOINTMENT (OUTPATIENT)
Dept: PHYSICAL THERAPY | Facility: HOSPITAL | Age: 83
End: 2023-01-31
Attending: PHYSICAL MEDICINE & REHABILITATION
Payer: MEDICARE

## 2023-02-07 ENCOUNTER — OFFICE VISIT (OUTPATIENT)
Dept: PHYSICAL THERAPY | Facility: HOSPITAL | Age: 83
End: 2023-02-07
Attending: PHYSICAL MEDICINE & REHABILITATION
Payer: MEDICARE

## 2023-02-07 PROCEDURE — 97112 NEUROMUSCULAR REEDUCATION: CPT

## 2023-02-07 PROCEDURE — 97110 THERAPEUTIC EXERCISES: CPT

## 2023-02-07 NOTE — PROGRESS NOTES
[facility]     2/25/1940  Referring Physician:  Otoniel Alford  Diagnosis: Lumbosacral radiculopathy at L5 (M54.17)  Compression fracture of L5 vertebra with routine healing, subsequent encounter (S32.050D)  Compression fracture of T4 vertebra with routine healing (S22.040D)  Spinal stenosis of lumbar region with neurogenic claudication (M48.062)  Lumbar foraminal stenosis (M48.061)  Bulging lumbar disc (M51.36)  S/P lumbar fusion (Z98.1)  Retrolisthesis of vertebrae (M43.10)  Spondylolisthesis of lumbar region (M43.16)  Spondylolisthesis at L4-L5 level (M43.16)  Lumbar post-laminectomy syndrome (M96.1)    Initial Evaluation Date: 1/9/2023  Date of Surgery: None for this episode of care    Insurance type and authorized visits: Medicare, NA  Plan of care expires: For orders: 4/9/2023; For insurance: 1/9/2023    Precautions/Hx: 12/2019 L4-5 fusion, L hip partial replacement, 1/3/2023 L4-5 medial branch blocks, 2/11/2022 & 8/12/2022 B L3 TFESI, B L4 & L5 medial branch radiofrequency neurotomy on 1/27/2023. SUBJECTIVE:     Pt reports that she had the ablation and feels that it is \"just kicking in now\". She notes that she has her worst pain when she wakes up in the morning and again by the end of the day. She states that she was sore after the last tx for one day. She notes that she is able to do the exercises easier on the R side than the L.        Visit count 4/9/2023 1/8 2/8 3/8 4/8   Date 1/9/2023 1/17/2023 1/24/2023 2/7/2023    LBP       Pain Range 3 to 9/10 3-8/10 3-8/10 2-5/10   Pain Ave 6/10 5-6/10 5-6/10 3-4/10      OBJECTIVE:     Treatment performed this date:    Therapeutic Exercise:  Visit #   2/8 3/8 4/8   Position Exercise HEP 1/17/2023 1/24/2023 2/7/2023    Supine Sciatic nerve glides H x      Cross leg piriformis H x      SKC H  X 5x X 3x     DKC H  X 5x X 10x cues for exhale    LTR H  X 10x X 7x    Bridging   X 10x X 3x    Bridging w toes up   X 10x     Bridging w feet together   X 10x Hooklying hip add ball    X 5x    Hooklying hip add ball pelvic floor H   X 10x    Bridging hip add p floor    X 5x    Glut sets w legs extended   X not tolerated     Glut sets w knees on 6\" bolster   X 10x X 10x   Sitting Leg pump nerve glides H X 10x      Piriformis stretch H X 3x10s      Functional squat H X 10x from 23\", unable at 22\"      Hip add ball p floor H   X 10x    TA set exhale H   X 5x   Neuro Re-ed  X see assessment  X see assessment  X see assessment    H = HEP. Pt given copies of this exercise for home program.  X = Exercises done this date - pt verbalized understanding and demonstrated competence. All exercises done B unless otherwise indicated. Pt advised to discontinue exercises that increase pain and to call or return to therapist to discuss. Each intervention above is specifically prescribed to address the patients identified impairments, activity limitations, and participation restrictions. ASSESSMENT:     Pt educated on importance of pelvic floor to both continence control and lower pelvic and lumbar control. Pt shown models, diagrams, demonstration with discussion and verbalized understanding. Pt did well to add p floor to hooklying exercises. Pt continues to have poor tolerance for light bridging in supine. Pt able to progress to p floor activation in sitting. Pt at beginning level of lumbopelvic control w deconditioning. Discussed the inhibition of opposite muscle groups as reflexive. Pt noting loss of abdominal tone when she has pain and lumbar tension. Pt challenged to gain abdominal activation w exhale vs inhale. Progressed well w cueing and practice to become independent. Physical Therapy Goals: From 1/9/2023 to 4/9/2023  - Created with patient input during initial evaluation   1. Pt will be independent in beginning level of HEP for stretching, posture and strengthening. 2. Pt will be able to stand to make a light meal without increased symptoms.   3. Pt will be able to carry light to mod laundry basket without increased symptoms. 4. Pt will be able to return to exercise on bike without increased symptoms. 5. Pt will be able to transfer sit <> stand without increased symptoms. PLAN OF CARE:      Continue PT per original plan for therapeutic exercises, posture retraining, therapeutic activities, manual treatment, neuromuscular reeducation, therapeutic pain neuroscience education, patient education, self care home management, home exercise program, and modalities as needed. Charges: Neuro, TE2    Total Timed treatment: 45 min      Total Treatment Time: 45 min     __________________________________________________________________________________________      21st Century Cures Act Notice to Patient: Medical documents like this are made available to patients in the interest of transparency. However, be advised this is a medical document and it is intended as rusw-wt-nxgi communication between your medical providers. This medical document may contain abbreviations, assessments, medical data, and results or other terms that are unfamiliar. Medical documents are intended to carry relevant information, facts as evident, and the clinical opinion of the practitioner. As such, this medical document may be written in language that appears blunt or direct. You are encouraged to contact your medical provider and/or [facility] Patient Experience if you have any questions about this medical document.   Objective Initial Evaluation Data 1/9/2023:    Functional Scores 1/9/2023   FOTO primary measure 47     Gait:        Deviations: trunk flex, min L>R compensated Trendelenburg        Devices: none       Assistance: none    Posture 1/9/2023   Alignment Min B pes cavus, min increased thoracic kyphosis, forward head position mild protracted shoulders,        Sensation 1/9/2023   Dermatomes Light Touch    Proximal medial thigh R (L2)  wnl   Proximal medial thigh L (L2) wnl Above knee R (L3) wnl   Above knee L (L3) wnl   Medial arch R(L4) wnl   Medial arch L (L4) wnl   Between 1st - 2nd toes R (L5) wnl   Between 1st - 2nd toes L (L5) wnl   Lateral border of foot R (S1) wnl   Lateral border of foot L (S1) wnl   Popliteal fossa R (S2) wnl   Popliteal fossa L(S2) wnl       Abdominals 1/9/2023   Tone  low     Lumbopelvic 1/9/2023   Control  Poor due to lack of mobility       ROM Lumbar 1/9/2023   Flexion Min loss upper L, lower limit wnl for fusion   Extension Mod-max upper lumbar   R SB min   L SB min   R Rotation Mod-max   L Rotation mod   * pain limiting    ROM Hip 1/9/2023   Flex R 125 114   Flex L 125 116   ER R 45 53   ER L 45 48   IR R 40 25   IR L 40 30   *pain limiting     MMT 5/5 1/9/2023   L2- hip flex R 4*   Hip flex L 4*   L3- knee ext R 5   Knee ext L 5   L4- ankle DF R 5   Ankle DF L 4+   L5- EHL R 5-   EHL L 5   S1- ankle PF R 3+ calf atrophy   Ankle PF L 5-   S2- Hams R 4+   Hams L 5   *pain limiting    LE flexibility 1/9/2023   Piriformis L mod   Piriformis R Min-mod   Hams L -31   Hams R -32   *pain limiting    Neural mobility 1/9/2023   SLR R 60 deg myofascial only   SLR L 60 deg myofascial only      SYLVIA 1/9/2023   R Min-mod loss ROM   L Min-mod loss ROM

## 2023-02-08 DIAGNOSIS — M54.17 LUMBOSACRAL RADICULOPATHY AT L5: ICD-10-CM

## 2023-02-08 NOTE — TELEPHONE ENCOUNTER
Refill Request    Medication request: HYDROcodone-acetaminophen (NORCO)  MG Oral Tab  Take 1 tablet by mouth every 6 (six) hours as needed for Pain. Shilpa Rick MD   Due back to clinic per last office note:  \"follow up in 2-3 months\"  NOV: 2/16/2023 José Miguel Wild MD      [place]: 12/23/22 #120    Urine drug screen (if applicable): none  Pain contract: Valid until 8/24/23    LOV plan (if weaning or changing medications): Per  at [address] [place] [address]: 'She will continue with the [facility] for the pain. \"

## 2023-02-09 ENCOUNTER — MED REC SCAN ONLY (OUTPATIENT)
Dept: PHYSICAL MEDICINE AND REHAB | Facility: CLINIC | Age: 83
End: 2023-02-09

## 2023-02-10 RX ORDER — HYDROCODONE BITARTRATE AND ACETAMINOPHEN 10; 325 MG/1; MG/1
1 TABLET ORAL EVERY 6 HOURS PRN
Qty: 120 TABLET | Refills: 0 | Status: SHIPPED | OUTPATIENT
Start: 2023-02-10 | End: 2023-03-12

## 2023-02-14 ENCOUNTER — OFFICE VISIT (OUTPATIENT)
Dept: PHYSICAL THERAPY | Facility: HOSPITAL | Age: 83
End: 2023-02-14
Attending: PHYSICAL MEDICINE & REHABILITATION
Payer: MEDICARE

## 2023-02-14 PROCEDURE — 97116 GAIT TRAINING THERAPY: CPT

## 2023-02-14 PROCEDURE — 97112 NEUROMUSCULAR REEDUCATION: CPT

## 2023-02-14 PROCEDURE — 97110 THERAPEUTIC EXERCISES: CPT

## 2023-02-14 NOTE — PROGRESS NOTES
Lela Cost    2/25/1940  Referring Physician:  Anam Dial  Diagnosis: Lumbosacral radiculopathy at L5 (M54.17)  Compression fracture of L5 vertebra with routine healing, subsequent encounter (S32.050D)  Compression fracture of T4 vertebra with routine healing (S22.040D)  Spinal stenosis of lumbar region with neurogenic claudication (M48.062)  Lumbar foraminal stenosis (M48.061)  Bulging lumbar disc (M51.36)  S/P lumbar fusion (Z98.1)  Retrolisthesis of vertebrae (M43.10)  Spondylolisthesis of lumbar region (M43.16)  Spondylolisthesis at L4-L5 level (M43.16)  Lumbar post-laminectomy syndrome (M96.1)    Initial Evaluation Date: 1/9/2023  Date of Surgery: None for this episode of care    Insurance type and authorized visits: Medicare, NA  Plan of care expires: For orders: 4/9/2023; For insurance: 1/9/2023    Precautions/Hx: 12/2019 L4-5 fusion, L hip partial replacement, 1/3/2023 L4-5 medial branch blocks, 2/11/2022 & 8/12/2022 B L3 TFESI, B L4 & L5 medial branch radiofrequency neurotomy on 1/27/2023. SUBJECTIVE:     Pt reports that she is having some pain in her R hip recently. She notes that she has felt that her leg length has been different since her partial hip replacement. She continues to have fluctuation in pain levels. She has LBP and mid back pain. Her pain is up from last week also in her R hand and wrist.  She states that she has started to slowly return to riding her stationary bicycle.      Visit count 4/9/2023 1/8 2/8 3/8 4/8 5/8   Date 1/9/2023 1/17/2023 1/24/2023 2/7/2023 2/14/2023    LBP        Pain Range 3 to 9/10 3-8/10 3-8/10 2-5/10 3 to 7-8/10   Pain Ave 6/10 5-6/10 5-6/10 3-4/10 4-5/10      OBJECTIVE:     Treatment performed this date:    Therapeutic Exercise:  Visit #   3/8 4/8 5/8   Position Exercise HEP 1/24/2023 2/7/2023 2/14/2023    NuStep Seat 7, arms 9.5, resist 4    X 5 min 2 intervals   Supine Sciatic nerve glides H       Cross leg piriformis H       SKC H X 5x X 3x DKC H X 5x X 10x cues for exhale     LTR H X 10x X 7x     Bridging  X 10x X 3x     Bridging w toes up  X 10x      Bridging w feet together  X 10x      Hooklying hip add ball   X 5x     Hooklying hip add ball pelvic floor H  X 10x     Bridging hip add p floor   X 5x     Glut sets w legs extended  X not tolerated      Glut sets w knees on 6\" bolster  X 10x X 10x    Sitting Leg pump nerve glides H   X     Piriformis stretch H   X    Functional squat H   X    Hip add ball p floor H  X 10x     TA set exhale H  X 5x X   Gait Patterning w shoe lift    X 50x4   Neuro Re-ed  X see assessment  X see assessment  X see assessment    H = HEP. Pt given copies of this exercise for home program.  X = Exercises done this date - pt verbalized understanding and demonstrated competence. All exercises done B unless otherwise indicated. Pt advised to discontinue exercises that increase pain and to call or return to therapist to discuss. Each intervention above is specifically prescribed to address the patients identified impairments, activity limitations, and participation restrictions. ASSESSMENT:     Pt has had some worsening of her LBP this past week. Pt demonstrates LLD in stance R>L. She has mild lurch in gait. Pt tolerated 3 layer lift w good leveling to 80%  Pt advised to progress slowly starting w 1 hour/day. Pt instructed in use of aerobic activity with interval work to be performed as part of HEP. Pt advised to progress very slowly and remain consistent. Pt demonstrated low level strength and endurance for start of aerobic training. Physical Therapy Goals: From 1/9/2023 to 4/9/2023  - Created with patient input during initial evaluation   1. Pt will be independent in beginning level of HEP for stretching, posture and strengthening. 2. Pt will be able to stand to make a light meal without increased symptoms. 3. Pt will be able to carry light to mod laundry basket without increased symptoms.   4. Pt will be able to return to exercise on bike without increased symptoms. 5. Pt will be able to transfer sit <> stand without increased symptoms. PLAN OF CARE:      Assess response to interval work. Continue PT per original plan for therapeutic exercises, posture retraining, therapeutic activities, manual treatment, neuromuscular reeducation, therapeutic pain neuroscience education, patient education, self care home management, home exercise program, and modalities as needed. Charges: Neuro, TE, Gait   Total Timed treatment: 45 min      Total Treatment Time: 45 min     __________________________________________________________________________________________      21st Century Cures Act Notice to Patient: Medical documents like this are made available to patients in the interest of transparency. However, be advised this is a medical document and it is intended as xtmu-eb-hmss communication between your medical providers. This medical document may contain abbreviations, assessments, medical data, and results or other terms that are unfamiliar. Medical documents are intended to carry relevant information, facts as evident, and the clinical opinion of the practitioner. As such, this medical document may be written in language that appears blunt or direct. You are encouraged to contact your medical provider and/or [facility] Patient Experience if you have any questions about this medical document.   Objective Initial Evaluation Data 1/9/2023:    Functional Scores 1/9/2023   FOTO primary measure 47     Gait:        Deviations: trunk flex, min L>R compensated Trendelenburg        Devices: none       Assistance: none    Posture 1/9/2023   Alignment Min B pes cavus, min increased thoracic kyphosis, forward head position mild protracted shoulders,        Sensation 1/9/2023   Dermatomes Light Touch    Proximal medial thigh R (L2)  wnl   Proximal medial thigh L (L2) wnl   Above knee R (L3) wnl   Above knee L (L3) wnl Medial arch R(L4) wnl   Medial arch L (L4) wnl   Between 1st - 2nd toes R (L5) wnl   Between 1st - 2nd toes L (L5) wnl   Lateral border of foot R (S1) wnl   Lateral border of foot L (S1) wnl   Popliteal fossa R (S2) wnl   Popliteal fossa L(S2) wnl       Abdominals 1/9/2023   Tone  low     Lumbopelvic 1/9/2023   Control  Poor due to lack of mobility       ROM Lumbar 1/9/2023   Flexion Min loss upper L, lower limit wnl for fusion   Extension Mod-max upper lumbar   R SB min   L SB min   R Rotation Mod-max   L Rotation mod   * pain limiting    ROM Hip 1/9/2023   Flex R 125 114   Flex L 125 116   ER R 45 53   ER L 45 48   IR R 40 25   IR L 40 30   *pain limiting     MMT 5/5 1/9/2023   L2- hip flex R 4*   Hip flex L 4*   L3- knee ext R 5   Knee ext L 5   L4- ankle DF R 5   Ankle DF L 4+   L5- EHL R 5-   EHL L 5   S1- ankle PF R 3+ calf atrophy   Ankle PF L 5-   S2- Hams R 4+   Hams L 5   *pain limiting    LE flexibility 1/9/2023   Piriformis L mod   Piriformis R Min-mod   Hams L -31   Hams R -32   *pain limiting    Neural mobility 1/9/2023   SLR R 60 deg myofascial only   SLR L 60 deg myofascial only      SYLVIA 1/9/2023   R Min-mod loss ROM   L Min-mod loss ROM

## 2023-02-16 ENCOUNTER — OFFICE VISIT (OUTPATIENT)
Dept: PHYSICAL MEDICINE AND REHAB | Facility: CLINIC | Age: 83
End: 2023-02-16
Payer: MEDICARE

## 2023-02-16 ENCOUNTER — TELEPHONE (OUTPATIENT)
Dept: PHYSICAL MEDICINE AND REHAB | Facility: CLINIC | Age: 83
End: 2023-02-16

## 2023-02-16 VITALS — HEIGHT: 61 IN | OXYGEN SATURATION: 94 % | HEART RATE: 76 BPM | BODY MASS INDEX: 21.9 KG/M2 | WEIGHT: 116 LBS

## 2023-02-16 DIAGNOSIS — M43.16 SPONDYLOLISTHESIS AT L4-L5 LEVEL: ICD-10-CM

## 2023-02-16 DIAGNOSIS — F33.8 OTHER RECURRENT DEPRESSIVE DISORDERS (HCC): ICD-10-CM

## 2023-02-16 DIAGNOSIS — M51.24 HERNIATED THORACIC DISC WITHOUT MYELOPATHY: ICD-10-CM

## 2023-02-16 DIAGNOSIS — M43.16 SPONDYLOLISTHESIS OF LUMBAR REGION: ICD-10-CM

## 2023-02-16 DIAGNOSIS — Z98.1 S/P LUMBAR FUSION: ICD-10-CM

## 2023-02-16 DIAGNOSIS — M48.062 SPINAL STENOSIS OF LUMBAR REGION WITH NEUROGENIC CLAUDICATION: ICD-10-CM

## 2023-02-16 DIAGNOSIS — S32.050D COMPRESSION FRACTURE OF L5 VERTEBRA WITH ROUTINE HEALING, SUBSEQUENT ENCOUNTER: ICD-10-CM

## 2023-02-16 DIAGNOSIS — M51.9 THORACIC DISC DISEASE: ICD-10-CM

## 2023-02-16 DIAGNOSIS — S22.040D COMPRESSION FRACTURE OF T4 VERTEBRA WITH ROUTINE HEALING: ICD-10-CM

## 2023-02-16 DIAGNOSIS — M47.816 LUMBAR FACET ARTHROPATHY: ICD-10-CM

## 2023-02-16 DIAGNOSIS — M54.17 LUMBOSACRAL RADICULOPATHY AT L5: Primary | ICD-10-CM

## 2023-02-16 DIAGNOSIS — M48.04 THORACIC STENOSIS: ICD-10-CM

## 2023-02-16 DIAGNOSIS — M43.10 RETROLISTHESIS OF VERTEBRAE: ICD-10-CM

## 2023-02-16 DIAGNOSIS — M51.36 BULGING LUMBAR DISC: ICD-10-CM

## 2023-02-16 DIAGNOSIS — M48.061 LUMBAR FORAMINAL STENOSIS: ICD-10-CM

## 2023-02-16 DIAGNOSIS — M96.1 LUMBAR POST-LAMINECTOMY SYNDROME: ICD-10-CM

## 2023-02-16 PROCEDURE — 99214 OFFICE O/P EST MOD 30 MIN: CPT | Performed by: PHYSICAL MEDICINE & REHABILITATION

## 2023-02-16 NOTE — TELEPHONE ENCOUNTER
Per Medicare Guidelines -no authorization is required for Bilateral L2 and L3 medial branch blocks CPT [id number], [id number]     Status: Authorization is not required however may be subject to review once claim is submitted-Covered Benefit

## 2023-02-17 NOTE — PATIENT INSTRUCTIONS
Plan  I will perform bilateral L2 and L3 medial branch blocks. If she does well with the above, then I will do bilateral L2 and L3 medial branch radiofrequency neurotomies under IV conscious sedation. The patient will continue with PT. The patient will continue with her home exercise program.    She will continue with the [place] for the pain. The patient will follow up in 2-3 months, but the patient will call me 1 day after having the injections to let me know how the injections worked.

## 2023-02-21 ENCOUNTER — OFFICE VISIT (OUTPATIENT)
Dept: PHYSICAL THERAPY | Facility: HOSPITAL | Age: 83
End: 2023-02-21
Attending: PHYSICAL MEDICINE & REHABILITATION
Payer: MEDICARE

## 2023-02-21 PROCEDURE — 97112 NEUROMUSCULAR REEDUCATION: CPT

## 2023-02-21 PROCEDURE — 97110 THERAPEUTIC EXERCISES: CPT

## 2023-02-21 NOTE — PROGRESS NOTES
Jaron Vila    2/25/1940  Referring Physician:  Emily Pyle  Diagnosis: Lumbosacral radiculopathy at L5 (M54.17)  Compression fracture of L5 vertebra with routine healing, subsequent encounter (S32.050D)  Compression fracture of T4 vertebra with routine healing (S22.040D)  Spinal stenosis of lumbar region with neurogenic claudication (M48.062)  Lumbar foraminal stenosis (M48.061)  Bulging lumbar disc (M51.36)  S/P lumbar fusion (Z98.1)  Retrolisthesis of vertebrae (M43.10)  Spondylolisthesis of lumbar region (M43.16)  Spondylolisthesis at L4-L5 level (M43.16)  Lumbar post-laminectomy syndrome (M96.1)    Initial Evaluation Date: 1/9/2023  Date of Surgery: None for this episode of care    Insurance type and authorized visits: Medicare, NA  Plan of care expires: For orders: 4/9/2023; For insurance: 1/9/2023    Precautions/Hx: 12/2019 L4-5 fusion, L hip partial replacement, 1/3/2023 L4-5 medial branch blocks, 2/11/2022 & 8/12/2022 B L3 TFESI, B L4 & L5 medial branch radiofrequency neurotomy on 1/27/2023. SUBJECTIVE:     Pt reports that she has been doing some interval work on her stationary bike. She has had appropriate muscle soreness and has been taking a day off in between. She has had min relief from her recent B L4 and L5 MBRFN on 1/27. She continues to get good relief w Norco.   She continues to have large fluctuations of pain w barometric changes. She is unsure if Celebrex is helping her or not. Pt states that she has tolerated one layer of lift well.     Visit count 4/9/2023 1/8 2/8 3/8 4/8 5/8 6/8   Date 1/9/2023 1/17/2023 1/24/2023 2/7/2023 2/14/2023 2/21/2023    LBP         Pain Range 3 to 9/10 3-8/10 3-8/10 2-5/10 3 to 7-8/10 3 to 7-8/10   Pain Ave 6/10 5-6/10 5-6/10 3-4/10 4-5/10 4-5/10      OBJECTIVE:     Treatment performed this date:    Therapeutic Exercise:  Visit #   4/8 5/8 6/8   Position Exercise HEP 2/7/2023 2/14/2023 2/21/2023    NuStep Seat 7, arms 9.5, resist 4   X 5 min 2 intervals X 5 min 2 intervals   Supine Sciatic nerve glides H   X 10x    Cross leg piriformis H   X 5x    SKC H X 3x   X 3x    DKC H X 10x cues for exhale  X 10x    LTR H X 7x  X 10x    Bridging  X 3x  X 10x    Bridging w toes up        Bridging w feet together        Hooklying hip add ball  X 5x      Hooklying hip add ball pelvic floor H X 10x      Bridging hip add p floor  X 5x      Glut sets w legs extended        Glut sets w knees on 6\" bolster  X 10x      Hip flexor stretch foot on stool    X 3x1 min    Hip flexor stretch w quad stretch H   X 1x30s   Sitting Leg pump nerve glides H  X      Piriformis stretch H  X     Functional squat H  X     Hip add ball p floor H X 10x      TA set exhale H X 5x X    Gait Patterning w shoe lift   X 50x4    Neuro Re-ed  X see assessment  X see assessment  X see assessment    H = HEP. Pt given copies of this exercise for home program.  X = Exercises done this date - pt verbalized understanding and demonstrated competence. All exercises done B unless otherwise indicated. Pt advised to discontinue exercises that increase pain and to call or return to therapist to discuss. Each intervention above is specifically prescribed to address the patients identified impairments, activity limitations, and participation restrictions. ASSESSMENT:     Pt not noticing relief from recent MBRFN has plan for further injections. Pt doing well to progress use of LE strengthening w intervals w appropriate rest days. Pt demonstrates good recall of pattern for interval work. Pt educated on strengthening concept of microfiber tears occurring normally and neural regrowth causing muscle soreness. Advised pt this is normal.   Pt advised to slowly integrate one more layer of shoe lift. Pt demonstrates B tight hip flexors and quads. Pt had better tolerance for stretch w lower leg supported on stool.   Discussed importance of hip flexor mobility to neutral pelvis for lumbopelvic alignment in stance. Physical Therapy Goals: From 1/9/2023 to 4/9/2023  - Created with patient input during initial evaluation   1. Pt will be independent in beginning level of HEP for stretching, posture and strengthening. 2. Pt will be able to stand to make a light meal without increased symptoms. 3. Pt will be able to carry light to mod laundry basket without increased symptoms. 4. Pt will be able to return to exercise on bike without increased symptoms. 5. Pt will be able to transfer sit <> stand without increased symptoms. PLAN OF CARE:      Assess response to additional layer of shoe lift and addition of hip flexor stretch. Continue PT per original plan for therapeutic exercises, posture retraining, therapeutic activities, manual treatment, neuromuscular reeducation, therapeutic pain neuroscience education, patient education, self care home management, home exercise program, and modalities as needed. Charges: Neuro, TE2  Total Timed treatment: 45 min      Total Treatment Time: 45 min     __________________________________________________________________________________________      21st Century Cures Act Notice to Patient: Medical documents like this are made available to patients in the interest of transparency. However, be advised this is a medical document and it is intended as efto-zy-muhx communication between your medical providers. This medical document may contain abbreviations, assessments, medical data, and results or other terms that are unfamiliar. Medical documents are intended to carry relevant information, facts as evident, and the clinical opinion of the practitioner. As such, this medical document may be written in language that appears blunt or direct. You are encouraged to contact your medical provider and/or [facility] Patient Experience if you have any questions about this medical document.   Objective Initial Evaluation Data 1/9/2023:    Functional Scores 1/9/2023   FOTO primary measure 47     Gait:        Deviations: trunk flex, min L>R compensated Trendelenburg        Devices: none       Assistance: none    Posture 1/9/2023   Alignment Min B pes cavus, min increased thoracic kyphosis, forward head position mild protracted shoulders,        Sensation 1/9/2023   Dermatomes Light Touch    Proximal medial thigh R (L2)  wnl   Proximal medial thigh L (L2) wnl   Above knee R (L3) wnl   Above knee L (L3) wnl   Medial arch R(L4) wnl   Medial arch L (L4) wnl   Between 1st - 2nd toes R (L5) wnl   Between 1st - 2nd toes L (L5) wnl   Lateral border of foot R (S1) wnl   Lateral border of foot L (S1) wnl   Popliteal fossa R (S2) wnl   Popliteal fossa L(S2) wnl       Abdominals 1/9/2023   Tone  low     Lumbopelvic 1/9/2023   Control  Poor due to lack of mobility       ROM Lumbar 1/9/2023   Flexion Min loss upper L, lower limit wnl for fusion   Extension Mod-max upper lumbar   R SB min   L SB min   R Rotation Mod-max   L Rotation mod   * pain limiting    ROM Hip 1/9/2023   Flex R 125 114   Flex L 125 116   ER R 45 53   ER L 45 48   IR R 40 25   IR L 40 30   *pain limiting     MMT 5/5 1/9/2023   L2- hip flex R 4*   Hip flex L 4*   L3- knee ext R 5   Knee ext L 5   L4- ankle DF R 5   Ankle DF L 4+   L5- EHL R 5-   EHL L 5   S1- ankle PF R 3+ calf atrophy   Ankle PF L 5-   S2- Hams R 4+   Hams L 5   *pain limiting    LE flexibility 1/9/2023   Piriformis L mod   Piriformis R Min-mod   Hams L -31   Hams R -32        2/21/2023    Hip flexor L mod   Hip flexor R Mod (+)   Quads L mod   Quads R mod   *pain limiting    Neural mobility 1/9/2023   SLR R 60 deg myofascial only   SLR L 60 deg myofascial only      SYLVIA 1/9/2023   R Min-mod loss ROM   L Min-mod loss ROM

## 2023-02-28 ENCOUNTER — OFFICE VISIT (OUTPATIENT)
Dept: PHYSICAL THERAPY | Facility: HOSPITAL | Age: 83
End: 2023-02-28
Attending: PHYSICAL MEDICINE & REHABILITATION
Payer: MEDICARE

## 2023-02-28 PROCEDURE — 97112 NEUROMUSCULAR REEDUCATION: CPT

## 2023-02-28 PROCEDURE — 97110 THERAPEUTIC EXERCISES: CPT

## 2023-02-28 NOTE — PROGRESS NOTES
Tianaor Rosy    2/25/1940  Referring Physician:  Kiran Hernandez  Diagnosis: Lumbosacral radiculopathy at L5 (M54.17)  Compression fracture of L5 vertebra with routine healing, subsequent encounter (S32.050D)  Compression fracture of T4 vertebra with routine healing (S22.040D)  Spinal stenosis of lumbar region with neurogenic claudication (M48.062)  Lumbar foraminal stenosis (M48.061)  Bulging lumbar disc (M51.36)  S/P lumbar fusion (Z98.1)  Retrolisthesis of vertebrae (M43.10)  Spondylolisthesis of lumbar region (M43.16)  Spondylolisthesis at L4-L5 level (M43.16)  Lumbar post-laminectomy syndrome (M96.1)    Initial Evaluation Date: 1/9/2023  Date of Surgery: None for this episode of care    Insurance type and authorized visits: Medicare, NA  Plan of care expires: For orders: 4/9/2023; For insurance: 1/9/2023    Precautions/Hx: 12/2019 L4-5 fusion, L hip partial replacement, 1/3/2023 L4-5 medial branch blocks, 2/11/2022 & 8/12/2022 B L3 TFESI, B L4 & L5 medial branch radiofrequency neurotomy on 1/27/2023. SUBJECTIVE:     Pt reports that she has been sore this week. She is thinking that the weather affected her. Pt notes that she had some R gluteal pain after the last visit and again after she tried doing the hip flexor stretch in supine at home. Pt states that she has a chair lift for her stairs at home.     Visit count 4/9/2023 1/8 2/8 3/8 4/8 5/8 6/8 7/8   Date 1/9/2023 1/17/2023 1/24/2023 2/7/2023 2/14/2023 2/21/2023 2/28/2023    LBP          Pain Range 3 to 9/10 3-8/10 3-8/10 2-5/10 3 to 7-8/10 3 to 7-8/10 3 to 7-8/10   Pain Ave 6/10 5-6/10 5-6/10 3-4/10 4-5/10 4-5/10 4-5/10      OBJECTIVE:     Treatment performed this date:    Therapeutic Exercise:  Visit #   5/8 6/8 7/8   Position Exercise HEP 2/14/2023 2/21/2023 2/28/2023    NuStep Seat 7, arms 9.5, resist 4  X 5 min 2 intervals X 5 min 2 intervals X 7 min, 3 intervals   Supine Sciatic nerve glides H  X 10x 10x    Cross leg piriformis H  X 5x SKC H  X 3x X 5x    DKC H  X 10x X 10x    LTR H  X 10x X 5x    Bridging   X 10x X 5x    Bridging w toes up    X 5x    Bridging w feet together        Hooklying hip add ball        Hooklying hip add ball pelvic floor H   X 5x    Bridging hip add p floor        Glut sets w legs extended        Glut sets w knees on 6\" bolster        Hip flexor stretch foot on stool   X 3x1 min     Hip flexor stretch w quad stretch H  X 1x30s Discontinued. Sitting Leg pump nerve glides H X       Piriformis stretch H X      Functional squat H X      Hip add ball p floor H       TA set exhale H X     Standing Hip flexors stretch    X R 2x    Step ups 6\"    X R 10x    Step up opp knee lift H   X L 2x    Step up 4\"    X L 10x    Step up opp       Gait Patterning w shoe lift  X 50x4     Neuro Re-ed  X see assessment  X see assessment  X see assessment    H = HEP. Pt given copies of this exercise for home program.  X = Exercises done this date - pt verbalized understanding and demonstrated competence. All exercises done B unless otherwise indicated. Pt advised to discontinue exercises that increase pain and to call or return to therapist to discuss. Each intervention above is specifically prescribed to address the patients identified impairments, activity limitations, and participation restrictions. ASSESSMENT:     Discussed possible lumbar extn occurring w supine hip flexor stretch. Discontinued from HEP at this time. Pt required verbal cueing for correct performance of interval training on NuStep this date. Discussed continued frequent use of her stationary bike for interval work. Discussed that because pt uses her chair lift she may loose ability to climb up stairs or curbs when in the community. Pt did well w a 6' step for the R LE, but unable well on the L. Able to use 4\" step. Physical Therapy Goals: From 1/9/2023 to 4/9/2023  - Created with patient input during initial evaluation   1.  Pt will be independent in beginning level of HEP for stretching, posture and strengthening. 2. Pt will be able to stand to make a light meal without increased symptoms. 3. Pt will be able to carry light to mod laundry basket without increased symptoms. 4. Pt will be able to return to exercise on bike without increased symptoms. 5. Pt will be able to transfer sit <> stand without increased symptoms. PLAN OF CARE:      Assess response to step up work. Continue PT per original plan for therapeutic exercises, posture retraining, therapeutic activities, manual treatment, neuromuscular reeducation, therapeutic pain neuroscience education, patient education, self care home management, home exercise program, and modalities as needed. Charges: Neuro1 - 12 min, TE2 - 33 min. Total Timed treatment: 45 min      Total Treatment Time: 45 min     __________________________________________________________________________________________      21st Century Cures Act Notice to Patient: Medical documents like this are made available to patients in the interest of transparency. However, be advised this is a medical document and it is intended as uyoy-uh-sooq communication between your medical providers. This medical document may contain abbreviations, assessments, medical data, and results or other terms that are unfamiliar. Medical documents are intended to carry relevant information, facts as evident, and the clinical opinion of the practitioner. As such, this medical document may be written in language that appears blunt or direct. You are encouraged to contact your medical provider and/or [facility] Patient Experience if you have any questions about this medical document.   Objective Initial Evaluation Data 1/9/2023:    Functional Scores 1/9/2023   FOTO primary measure 47     Gait:        Deviations: trunk flex, min L>R compensated Trendelenburg        Devices: none       Assistance: none    Posture 1/9/2023   Alignment Min B pes cavus, min increased thoracic kyphosis, forward head position mild protracted shoulders,        Sensation 1/9/2023   Dermatomes Light Touch    Proximal medial thigh R (L2)  wnl   Proximal medial thigh L (L2) wnl   Above knee R (L3) wnl   Above knee L (L3) wnl   Medial arch R(L4) wnl   Medial arch L (L4) wnl   Between 1st - 2nd toes R (L5) wnl   Between 1st - 2nd toes L (L5) wnl   Lateral border of foot R (S1) wnl   Lateral border of foot L (S1) wnl   Popliteal fossa R (S2) wnl   Popliteal fossa L(S2) wnl       Abdominals 1/9/2023   Tone  low     Lumbopelvic 1/9/2023   Control  Poor due to lack of mobility       ROM Lumbar 1/9/2023   Flexion Min loss upper L, lower limit wnl for fusion   Extension Mod-max upper lumbar   R SB min   L SB min   R Rotation Mod-max   L Rotation mod   * pain limiting    ROM Hip 1/9/2023   Flex R 125 114   Flex L 125 116   ER R 45 53   ER L 45 48   IR R 40 25   IR L 40 30   *pain limiting     MMT 5/5 1/9/2023   L2- hip flex R 4*   Hip flex L 4*   L3- knee ext R 5   Knee ext L 5   L4- ankle DF R 5   Ankle DF L 4+   L5- EHL R 5-   EHL L 5   S1- ankle PF R 3+ calf atrophy   Ankle PF L 5-   S2- Hams R 4+   Hams L 5        2/28/2023    Hip abd R    Hip abd L    Hip extn R    Hip extn L    *pain limiting    LE flexibility 1/9/2023   Piriformis L mod   Piriformis R Min-mod   Hams L -31   Hams R -32        2/21/2023    Hip flexor L mod   Hip flexor R Mod (+)   Quads L mod   Quads R mod   *pain limiting    Neural mobility 1/9/2023   SLR R 60 deg myofascial only   SLR L 60 deg myofascial only      SYLVIA 1/9/2023   R Min-mod loss ROM   L Min-mod loss ROM

## 2023-03-07 ENCOUNTER — OFFICE VISIT (OUTPATIENT)
Dept: PHYSICAL THERAPY | Facility: HOSPITAL | Age: 83
End: 2023-03-07
Attending: PHYSICAL MEDICINE & REHABILITATION
Payer: MEDICARE

## 2023-03-07 PROCEDURE — 97110 THERAPEUTIC EXERCISES: CPT

## 2023-03-07 PROCEDURE — 97112 NEUROMUSCULAR REEDUCATION: CPT

## 2023-03-07 NOTE — PROGRESS NOTES
Clive Del Toro    2/25/1940  Referring Physician:  Odalis Fuentes  Diagnosis: Lumbosacral radiculopathy at L5 (M54.17)  Compression fracture of L5 vertebra with routine healing, subsequent encounter (S32.050D)  Compression fracture of T4 vertebra with routine healing (S22.040D)  Spinal stenosis of lumbar region with neurogenic claudication (M48.062)  Lumbar foraminal stenosis (M48.061)  Bulging lumbar disc (M51.36)  S/P lumbar fusion (Z98.1)  Retrolisthesis of vertebrae (M43.10)  Spondylolisthesis of lumbar region (M43.16)  Spondylolisthesis at L4-L5 level (M43.16)  Lumbar post-laminectomy syndrome (M96.1)    Initial Evaluation Date: 1/9/2023  Date of Surgery: None for this episode of care    Insurance type and authorized visits: Medicare, NA  Plan of care expires: For orders: 4/9/2023; For insurance: 1/9/2023    Precautions/Hx: 12/2019 L4-5 fusion, L hip partial replacement, 1/3/2023 L4-5 medial branch blocks, 2/11/2022 & 8/12/2022 B L3 TFESI, B L4 & L5 medial branch radiofrequency neurotomy on 1/27/2023. SUBJECTIVE:     Pt reports that she continues to have back pain and is scheduled for a nerve block.      Visit count 4/9/2023 1/8 3/8 5/8 7/8 8/10   Date 1/9/2023 1/24/2023  2/14/2023  2/28/2023  3/7/2023    LBP        Pain Range 3 to 9/10 3-8/10 3 to 7-8/10 3 to 7-8/10 3 to 7-8/10   Pain Ave 6/10 5-6/10 4-5/10 4-5/10 4-5/10      OBJECTIVE:     Treatment performed this date:    Therapeutic Exercise:  Visit #   6/8 7/8 8/10   Position Exercise HEP 2/21/2023  2/28/2023  3/7/2023    NuStep Seat 7, arms 9.5, resist 4  X 5 min 2 intervals X 7 min, 3 intervals    Supine Sciatic nerve glides H X 10x 10x     Cross leg piriformis H X 5x      SKC H X 3x X 5x X 1x 5s    DKC H X 10x X 10x X 1x 10s    LTR H X 10x X 5x X 5x    Bridging  X 10x X 5x      Bridging w toes up   X 5x X 5x    Bridging w feet together        Unilat bridging cross leg    X 3x min ht    Hooklying hip add ball        Hooklying hip add ball pelvic floor H  X 5x X 5x    Bridging hip add p floor        Glut sets w legs extended    X 10x    Glut sets w knees on 6\" bolster        Hip flexor stretch foot on stool  X 3x1 min      Hip flexor stretch w quad stretch H X 1x30s Discontinued. Sidelying Hip abd    X 3x   Sitting Leg pump nerve glides H       Piriformis stretch H       Functional squat H   X 3x     Hip add ball p floor H       TA set exhale H      Standing Hip flexors stretch H  X R 2x X B 5x    Step ups 6\"   X R 10x     Step up opp knee lift H  X L 2x     Step up 4\"   X L 10x     Step up opp        Hip abd H   X 5x    Hip extn    X 7x    Glut set H   X 10x   Gait Patterning w shoe lift       Neuro Re-ed  X see assessment  X see assessment     H = HEP. Pt given copies of this exercise for home program.  X = Exercises done this date - pt verbalized understanding and demonstrated competence. All exercises done B unless otherwise indicated. Pt advised to discontinue exercises that increase pain and to call or return to therapist to discuss. Each intervention above is specifically prescribed to address the patients identified impairments, activity limitations, and participation restrictions. ASSESSMENT:     Pt educated on importance of end range hip extension control for forward weight shift in locomotion to avoid use of thoracolumbar ps and/or suboccipital substitution patterns. Pt able to add well to HEP as noted above. Pt advised to work consistently on slow descent to sitting to allow for eccentric training to gain improved sit to stand. Pt assessed and demonstrates weak hip abd and extn. Discussed continued self care for osteoporosis w use of hip knee flexion for reach down     Physical Therapy Goals: From 1/9/2023 to 4/9/2023  - Created with patient input during initial evaluation   1. Pt will be independent in beginning level of HEP for stretching, posture and strengthening.    2. Pt will be able to stand to make a light meal without increased symptoms. 3. Pt will be able to carry light to mod laundry basket without increased symptoms. 4. Pt will be able to return to exercise on bike without increased symptoms. 5. Pt will be able to transfer sit <> stand without increased symptoms. PLAN OF CARE:      Assess response to standing hip strengthening  Continue PT per original plan for therapeutic exercises, posture retraining, therapeutic activities, manual treatment, neuromuscular reeducation, therapeutic pain neuroscience education, patient education, self care home management, home exercise program, and modalities as needed. Charges: Neuro1 - 12 min, TE2 - 33 min. Total Timed treatment: 45 min      Total Treatment Time: 45 min     __________________________________________________________________________________________      21st Century Cures Act Notice to Patient: Medical documents like this are made available to patients in the interest of transparency. However, be advised this is a medical document and it is intended as sjuf-wv-kelp communication between your medical providers. This medical document may contain abbreviations, assessments, medical data, and results or other terms that are unfamiliar. Medical documents are intended to carry relevant information, facts as evident, and the clinical opinion of the practitioner. As such, this medical document may be written in language that appears blunt or direct. You are encouraged to contact your medical provider and/or [facility] Patient Experience if you have any questions about this medical document.   Objective Initial Evaluation Data 1/9/2023:    Functional Scores 1/9/2023   FOTO primary measure 47     Gait:        Deviations: trunk flex, min L>R compensated Trendelenburg        Devices: none       Assistance: none    Posture 1/9/2023   Alignment Min B pes cavus, min increased thoracic kyphosis, forward head position mild protracted shoulders,        Sensation 1/9/2023 Dermatomes Light Touch    Proximal medial thigh R (L2)  wnl   Proximal medial thigh L (L2) wnl   Above knee R (L3) wnl   Above knee L (L3) wnl   Medial arch R(L4) wnl   Medial arch L (L4) wnl   Between 1st - 2nd toes R (L5) wnl   Between 1st - 2nd toes L (L5) wnl   Lateral border of foot R (S1) wnl   Lateral border of foot L (S1) wnl   Popliteal fossa R (S2) wnl   Popliteal fossa L(S2) wnl       Abdominals 1/9/2023   Tone  low     Lumbopelvic 1/9/2023   Control  Poor due to lack of mobility       ROM Lumbar 1/9/2023   Flexion Min loss upper L, lower limit wnl for fusion   Extension Mod-max upper lumbar   R SB min   L SB min   R Rotation Mod-max   L Rotation mod   * pain limiting    ROM Hip 1/9/2023   Flex R 125 114   Flex L 125 116   ER R 45 53   ER L 45 48   IR R 40 25   IR L 40 30   *pain limiting     MMT 5/5 1/9/2023   L2- hip flex R 4*   Hip flex L 4*   L3- knee ext R 5   Knee ext L 5   L4- ankle DF R 5   Ankle DF L 4+   L5- EHL R 5-   EHL L 5   S1- ankle PF R 3+ calf atrophy   Ankle PF L 5-   S2- Hams R 4+   Hams L 5        3/7/2023    Hip abd R 3-   Hip abd L 3   Hip extn R 4-   Hip extn L 3+   *pain limiting    LE flexibility 1/9/2023   Piriformis L mod   Piriformis R Min-mod   Hams L -31   Hams R -32        2/21/2023    Hip flexor L mod   Hip flexor R Mod (+)   Quads L mod   Quads R mod   *pain limiting    Neural mobility 1/9/2023   SLR R 60 deg myofascial only   SLR L 60 deg myofascial only      SYLVIA 1/9/2023   R Min-mod loss ROM   L Min-mod loss ROM

## 2023-03-10 ENCOUNTER — OFFICE VISIT (OUTPATIENT)
Dept: SURGERY | Facility: CLINIC | Age: 83
End: 2023-03-10
Payer: MEDICARE

## 2023-03-10 DIAGNOSIS — M96.1 LUMBAR POST-LAMINECTOMY SYNDROME: ICD-10-CM

## 2023-03-10 DIAGNOSIS — M47.816 ARTHRITIS OF FACET JOINT OF LUMBAR SPINE: Primary | ICD-10-CM

## 2023-03-10 RX ORDER — HYDROCODONE BITARTRATE AND ACETAMINOPHEN 10; 325 MG/1; MG/1
1 TABLET ORAL EVERY 6 HOURS PRN
Qty: 120 TABLET | Refills: 0 | Status: SHIPPED | OUTPATIENT
Start: 2023-03-12 | End: 2023-04-11

## 2023-03-10 NOTE — PROCEDURES
[address]    LUMBAR MEDIAL BRANCH BLOCK   NAME:  Amy Reid    MR #:    [id number] :  1940     PHYSICIAN:  Irma Barba MD        Operative Report    DATE OF PROCEDURE: 3/10/2023   PREOPERATIVE DIAGNOSES: 1. Arthritis of facet joint of lumbar spine    2. Lumbar post-laminectomy syndrome: L4-5        POSTOPERATIVE DIAGNOSES:   1. Arthritis of facet joint of lumbar spine    2. Lumbar post-laminectomy syndrome: L4-5        PROCEDURES: bilateral L2 and L3 Medial Branch Block done under fluoroscopic guidance with contrast enhancement. SURGEON: Irma Wheatley MD   ANESTHESIA: Local   INDICATIONS:      OPERATIVE PROCEDURE:  Written consent was obtained from the patient. The patient was brought into the operating room and placed in the prone position on the fluoroscopy table with pillow underneath her abdomen. The patient's skin was cleaned and draped in a normal sterile fashion. Using AP fluoroscopy, all five lumbar vertebrae were identified. When the third and fourth vertebrae were identified, marks were placed on the patient's skin overlying were the bilateral transverse process meets the lamina at these levels. Also, fluoroscopy was used to identify  bilateral sacral ala. At this point in time, the patient's skin was anesthetized with 1% PF lidocaine without epinephrine overlying each needle site. Then, a 22 gauge needle was inserted and directed to the above stated sites. When they felt to be in good position, oblique fluoroscopy was used to confirm needle placement at the eye of the Andrés dog each level. At this time, Omnipaque 240 contrast was used to obtain a good fascial pattern at each site. Aspiration was performed and no blood, fluid, or air were aspirated. Then, each site was injected with 0.5cc of 0.5% PF marcaine without epinephrine. After this, the needles were removed. The patient's skin was cleaned. Band-Aids were applied.   The patient was transferred to the cart and into [facility]. The patient was given discharge instructions and will follow up in the clinic as scheduled. Throughout the whole procedure, the patient's pulse oximetry and vital signs were monitored and they remained completely stable. Also, throughout the whole procedure, prior to injection of any medication, aspiration was performed. No blood, fluid, or air was aspirated at anytime.

## 2023-03-13 ENCOUNTER — TELEPHONE (OUTPATIENT)
Dept: NEUROLOGY | Facility: CLINIC | Age: 83
End: 2023-03-13

## 2023-03-13 NOTE — TELEPHONE ENCOUNTER
Patient calling to speak to a nurse and provide condition update after injections on 3/10/23. She decline to provide condition update at this time she will like to speak to a nurse. Please call when available.

## 2023-03-14 ENCOUNTER — OFFICE VISIT (OUTPATIENT)
Dept: PHYSICAL THERAPY | Facility: HOSPITAL | Age: 83
End: 2023-03-14
Attending: PHYSICAL MEDICINE & REHABILITATION
Payer: MEDICARE

## 2023-03-14 PROCEDURE — 97112 NEUROMUSCULAR REEDUCATION: CPT

## 2023-03-14 PROCEDURE — 97110 THERAPEUTIC EXERCISES: CPT

## 2023-03-14 NOTE — PROGRESS NOTES
Discharge Summary    Pt has attended 9, cancelled 0, and no shown 0 visits in Physical Therapy. Renae Laws    2/25/1940  Referring Physician:  Roberto Brooks  Diagnosis: Lumbosacral radiculopathy at L5 (M54.17)  Compression fracture of L5 vertebra with routine healing, subsequent encounter (S32.050D)  Compression fracture of T4 vertebra with routine healing (S22.040D)  Spinal stenosis of lumbar region with neurogenic claudication (M48.062)  Lumbar foraminal stenosis (M48.061)  Bulging lumbar disc (M51.36)  S/P lumbar fusion (Z98.1)  Retrolisthesis of vertebrae (M43.10)  Spondylolisthesis of lumbar region (M43.16)  Spondylolisthesis at L4-L5 level (M43.16)  Lumbar post-laminectomy syndrome (M96.1)    Initial Evaluation Date: 1/9/2023  Date of Surgery: None for this episode of care    Insurance type and authorized visits: Medicare, NA  Plan of care expires: For orders: 4/9/2023; For insurance: 1/9/2023    Precautions/Hx: 12/2019 L4-5 fusion, L hip partial replacement, 1/3/2023 L4-5 medial branch blocks, 2/11/2022 & 8/12/2022 B L3 TFESI, B L4 & L5 medial branch radiofrequency neurotomy on 1/27/2023. SUBJECTIVE:     Pt reports that she feels ready to be discharged to her HEP. She is challenged at home as her  has health issues and has been falling. Visit count 4/9/2023 1/8 9/10   Date 1/9/2023 3/14/2023    LBP     Pain Range 3 to 9/10 3 to 7/10   Pain Ave 6/10 4/10     Pt has had some improvement w PT demonstrating improved transfers, strength and gait stability. She remains under stress at home due to her 's health issues and frequent falls. Pt requesting discharge to her HEP at this time. Pt has completed skilled physical therapy intervention with decrease of pain complaints. Pt displays improved: lumbopelvic control; lumbar ROM; B LE strength; tolerance for transfers, sit to stand and aerobic exercising. Pt demonstrates improved function as noted as progression towards goals. See objective discharge data below in tables with initial evaluation data. Pt has met goals, is independent with HEP and ready to be discharged to [facility]. OBJECTIVE:     Treatment performed this date:    Therapeutic Exercise:  Visit #   7/8 8/10 9/10   Position Exercise HEP 2/28/2023  3/7/2023  3/14/2023    NuStep Seat 7, arms 9.5, resist 4  X 7 min, 3 intervals     Supine Sciatic nerve glides H 10x  10x    Cross leg piriformis H   2x    SKC H X 5x X 1x 5s 1x    DKC H X 10x X 1x 10s 1x    LTR H X 5x X 5x 5x    Bridging  X 5x       Bridging w toes up  X 5x X 5x 5x    Bridging w feet together        Unilat bridging cross leg   X 3x min ht     Hooklying hip add ball        Hooklying hip add ball pelvic floor H X 5x X 5x     Bridging hip add p floor        Glut sets w legs extended   X 10x     Glut sets w knees on 6\" bolster        Hip flexor stretch foot on stool        Hip flexor stretch w quad stretch H Discontinued. Sidelying Hip abd   X 3x    Sitting Leg pump nerve glides H       Piriformis stretch H       Functional squat H  X 3x      Hip add ball p floor H       TA set exhale H      Standing Hip flexors stretch H X R 2x X B 5x 5x    Step ups 6\"  X R 10x      Step up opp knee lift H X L 2x      Step up 4\"  X L 10x  5x    Step up opp        Hip abd H  X 5x 5x    Hip extn   X 7x 5x    Glut set H  X 10x 5x    Heel lifts H   X 10x   Gait Patterning w shoe lift       Neuro Re-ed  X see assessment   X pt educated on services available at [facility] and [place] for transportation. Discussed avoidance of any physical assistance to her . H = HEP. Pt given copies of this exercise for home program.  X = Exercises done this date - pt verbalized understanding and demonstrated competence. All exercises done B unless otherwise indicated. Pt advised to discontinue exercises that increase pain and to call or return to therapist to discuss.   Each intervention above is specifically prescribed to address the patients identified impairments, activity limitations, and participation restrictions. ASSESSMENT:     Physical Therapy Goals: From 1/9/2023 to 4/9/2023, assessed 3/14/2023   - Created with patient input during initial evaluation   1. Pt will be independent in beginning level of HEP for stretching, posture and strengthening. MET  2. Pt will be able to stand to make a light meal without increased symptoms. Progressing  3. Pt will be able to carry light to mod laundry basket without increased symptoms. MET  4. Pt will be able to return to exercise on bike without increased symptoms. MET  5. Pt will be able to transfer sit <> stand without increased symptoms. MET     PLAN OF CARE:      Plan:  Discharge to [facility]    Thank you for your referral. If you have any questions, please contact me at Dept: [phone]. Sincerely,  Electronically signed by therapist: Donato Juan PT     Charges: Neuro2 - 24 min, TE2 - 33 min. Total Timed treatment: 57 min      Total Treatment Time: 57 min     __________________________________________________________________________________________      21st Century Cures Act Notice to Patient: Medical documents like this are made available to patients in the interest of transparency. However, be advised this is a medical document and it is intended as toaq-fh-bray communication between your medical providers. This medical document may contain abbreviations, assessments, medical data, and results or other terms that are unfamiliar. Medical documents are intended to carry relevant information, facts as evident, and the clinical opinion of the practitioner. As such, this medical document may be written in language that appears blunt or direct. You are encouraged to contact your medical provider and/or [facility] if you have any questions about this medical document.   Objective Initial Evaluation Data 1/9/2023:    Functional Scores 1/9/2023   FOTO primary measure 47     Gait:        Deviations: trunk flex, min L>R compensated Trendelenburg        Devices: none       Assistance: none    Posture 1/9/2023   Alignment Min B pes cavus, min increased thoracic kyphosis, forward head position mild protracted shoulders,        Sensation 1/9/2023   Dermatomes Light Touch    Proximal medial thigh R (L2)  wnl   Proximal medial thigh L (L2) wnl   Above knee R (L3) wnl   Above knee L (L3) wnl   Medial arch R(L4) wnl   Medial arch L (L4) wnl   Between 1st - 2nd toes R (L5) wnl   Between 1st - 2nd toes L (L5) wnl   Lateral border of foot R (S1) wnl   Lateral border of foot L (S1) wnl   Popliteal fossa R (S2) wnl   Popliteal fossa L(S2) wnl       Abdominals 1/9/2023   Tone  low     Lumbopelvic 1/9/2023 3/14/2023    Control  Poor due to lack of mobility Fair (-)       ROM Lumbar 1/9/2023 3/14/2023    Flexion Min loss upper L, lower limit wnl for fusion Min    Extension Mod-max upper lumbar Mod    R SB min min   L SB min min   R Rotation Mod-max Min-mod   L Rotation mod Min-mod   * pain limiting    ROM Hip 1/9/2023   Flex R 125 114   Flex L 125 116   ER R 45 53   ER L 45 48   IR R 40 25   IR L 40 30   *pain limiting     MMT 5/5 1/9/2023 3/14/2023    L2- hip flex R 4* 4+   Hip flex L 4* 5-   L3- knee ext R 5 5   Knee ext L 5 5   L4- ankle DF R 5 5   Ankle DF L 4+ 4+   L5- EHL R 5- 5-   EHL L 5 5   S1- ankle PF R 3+ calf atrophy 3+   Ankle PF L 5- 5-   S2- Hams R 4+ 5   Hams L 5 5-         3/7/2023  3/14/2023    Hip abd R 3- 3-   Hip abd L 3 3   Hip extn R 4- 4-   Hip extn L 3+ 3+   *pain limiting    LE flexibility 1/9/2023   Piriformis L mod   Piriformis R Min-mod   Hams L -31   Hams R -32        2/21/2023    Hip flexor L mod   Hip flexor R Mod (+)   Quads L mod   Quads R mod   *pain limiting    Neural mobility 1/9/2023   SLR R 60 deg myofascial only   SLR L 60 deg myofascial only      SYLVIA 1/9/2023   R Min-mod loss ROM   L Min-mod loss ROM

## 2023-03-15 NOTE — TELEPHONE ENCOUNTER
LMTCB. Need to get a condition update/ % relief from injection. Patient had bilateral L2 and L3 Medial Branch Block on 3/10/23 with . Plan per Henrietta Barrett at [address] [place] [address] 2/16/23: \"I will perform bilateral L2 and L3 medial branch blocks. If she does well with the above, then I will do bilateral L2 and L3 medial branch radiofrequency neurotomies under IV conscious sedation. \"

## 2023-04-03 ENCOUNTER — TELEPHONE (OUTPATIENT)
Dept: PHYSICAL MEDICINE AND REHAB | Facility: CLINIC | Age: 83
End: 2023-04-03

## 2023-04-03 NOTE — TELEPHONE ENCOUNTER
Contacted patient to schedule Bilateral L3 TFESIs, patient states she would like to hold off on this for the next few weeks as she is currently looking for care for her  and is in the middle of selling a house    Patient states she will call our office back when she is ready to schedule.

## 2023-04-03 NOTE — TELEPHONE ENCOUNTER
Per Medicare Guidelines -no authorization is required for Bilateral L3 TFESIs CPT 08889-63     Status: Authorization is not required however may be subject to review once claim is submitted-Covered Benefit

## 2023-04-17 RX ORDER — HYDROCODONE BITARTRATE AND ACETAMINOPHEN 10; 325 MG/1; MG/1
1 TABLET ORAL EVERY 6 HOURS PRN
Qty: 120 TABLET | Refills: 0 | Status: SHIPPED | OUTPATIENT
Start: 2023-04-17 | End: 2023-05-17

## 2023-04-17 NOTE — TELEPHONE ENCOUNTER
Refill Request    Medication request: HYDROcodone-acetaminophen (NORCO)  MG Oral Tab  Take 1 tablet by mouth every 6 (six) hours as needed for Pain. LOV: 2/16/23  Due back to clinic per last office note:  \"follow up in 2-3 months\"  NOV: Visit date not found      [place]: 3/13/23 #120    Urine drug screen (if applicable): none  Pain contract: Valid until 8/24/23    LOV plan (if weaning or changing medications): Per  at [address] [place] [address]: \"She will continue with the [address] [place] [address] for the pain. \"

## 2023-05-05 ENCOUNTER — OFFICE VISIT (OUTPATIENT)
Dept: SURGERY | Facility: CLINIC | Age: 83
End: 2023-05-05
Payer: MEDICARE

## 2023-05-05 DIAGNOSIS — Z98.1 S/P LUMBAR FUSION: ICD-10-CM

## 2023-05-05 DIAGNOSIS — M48.062 SPINAL STENOSIS OF LUMBAR REGION WITH NEUROGENIC CLAUDICATION: ICD-10-CM

## 2023-05-05 DIAGNOSIS — M51.36 BULGING LUMBAR DISC: ICD-10-CM

## 2023-05-05 DIAGNOSIS — M54.17 LUMBOSACRAL RADICULOPATHY AT L5: Primary | ICD-10-CM

## 2023-05-05 PROCEDURE — 64483 NJX AA&/STRD TFRM EPI L/S 1: CPT | Performed by: PHYSICAL MEDICINE & REHABILITATION

## 2023-05-05 NOTE — PROCEDURES
[address]    BILATERAL LUMBAR TRANSFORAMINAL   NAME:  Iram Natarajan    MR #:    [id number] :  1940     PHYSICIAN:  Len Laird MD        Operative Report    DATE OF PROCEDURE: 2023   PREOPERATIVE DIAGNOSES: 1. bilateral L5-S1 radiculopathy    2. L3-4 mod-severe, L2-3 mod-severe, L1-2 mod central stenosis    3. L5-S1 mod diffuse, L3-4 mod-large diffuse, L2-3 large diffuse, L1-2 mod diffuse bulging discs    4. S/P lumbar fusion: L4-5        POSTOPERATIVE DIAGNOSES:   1. bilateral L5-S1 radiculopathy    2. L3-4 mod-severe, L2-3 mod-severe, L1-2 mod central stenosis    3. L5-S1 mod diffuse, L3-4 mod-large diffuse, L2-3 large diffuse, L1-2 mod diffuse bulging discs    4. S/P lumbar fusion: L4-5        PROCEDURES: Bilateral L3 transforaminal epidural steroid injections done under fluoroscopic guidance with contrast enhancement. SURGEON: Len hWeatley MD   ANESTHESIA: Local   INDICATIONS:      OPERATIVE PROCEDURE:  Written consent was obtained from the patient. The patient was brought into the operating room and placed in the prone position on the fluoroscopy table with pillow underneath her abdomen. The patient's skin was cleaned and draped in a normal sterile fashion. Using AP fluoroscopy, all five lumbar vertebrae were identified. When the third vertebra was identified, fluoroscopy was left anterior obliqued opening up the right L3-4 intervertebral foramen. At this point in time, the patient's skin was anesthetized with 1% PF lidocaine without epinephrine. Then, a 3.5 inch, 22-gauge spinal needle was inserted and directed towards the right L3-4 intervertebral foramen. When it felt to be in good position, AP fluoroscopy was used to advance the needle to the 6 o'clock position on the right L3 pedicle. At this point in time, Omnipaque-240 contrast was used to obtain a good epidurogram indicating correct needle placement. Then, aspiration was performed.   No blood, fluid, or air was aspirated. Then, the patient was injected with a 3 cc solution of 1.5 cc of 40 mg/cc of Kenalog and 1.5 cc of 1% PF lidocaine without epinephrine. After this, the needle was removed. Then  fluoroscopy was right anterior obliqued opening up the left L3-4 intervertebral foramen. At this point in time, the patient's skin was anesthetized with 1 to 2 cc of 1% PF lidocaine without epinephrine. Then, a 3.5 inch, 22-gauge spinal needle was inserted and directed towards the left L3-4 intervertebral foramen. When it felt to be in good position, AP fluoroscopy was used to advance the needle to the 6 o'clock position on the left L3 pedicle. At this point in time, Omnipaque-240 contrast was used to obtain a good epidurogram indicating correct needle placement. Then, aspiration was performed. No blood, fluid, or air was aspirated. Then, the patient was injected with a 3 cc solution of 1.5 cc of 40 mg/cc of Kenalog and 1.5 cc of 1% PF lidocaine without epinephrine. After this, the needle was removed. The patient's skin was cleaned. Band-Aids were applied. The patient was transferred to the cart and into [facility]. The patient was given discharge instructions and will follow up in the clinic as scheduled. Throughout the whole procedure, the patient's pulse oximetry and vital signs were monitored and they remained completely stable. Also, throughout the whole procedure, prior to injection of any medication, aspiration was performed. No blood, fluid, or air was aspirated at anytime.

## 2023-05-16 ENCOUNTER — TELEPHONE (OUTPATIENT)
Dept: NEUROLOGY | Facility: CLINIC | Age: 83
End: 2023-05-16

## 2023-05-16 RX ORDER — HYDROCODONE BITARTRATE AND ACETAMINOPHEN 10; 325 MG/1; MG/1
1 TABLET ORAL EVERY 6 HOURS PRN
Qty: 120 TABLET | Refills: 0 | Status: SHIPPED | OUTPATIENT
Start: 2023-05-16 | End: 2023-06-15

## 2023-05-16 NOTE — TELEPHONE ENCOUNTER
Refill Request    Medication request: HYDROcodone-acetaminophen ([id number])  MG Oral Tab    LOV: 2/16/2023  RTC: 3 months  NOV: Visit date not found      [place]: 4/17/2023 #30 days    UDS: (if applicable): N/A  Pain contract: Valid through 8/24/2023    LOV plan (if weaning or changing medications): She will continue with the [place] for the pain.

## 2023-05-18 NOTE — TELEPHONE ENCOUNTER
Patient had Bilateral L3 transforaminal epidural steroid injections on 5/5/23 with .  -70% relief       Patient stated this injection has helped much more than the previous injections she had. Informed patient update will be relayed on to [address] Instructed back to call back with any changes. Patient understood and was very appreciative.

## 2023-06-20 ENCOUNTER — APPOINTMENT (OUTPATIENT)
Dept: GENERAL RADIOLOGY | Facility: HOSPITAL | Age: 83
End: 2023-06-20
Attending: EMERGENCY MEDICINE
Payer: MEDICARE

## 2023-06-20 ENCOUNTER — HOSPITAL ENCOUNTER (INPATIENT)
Facility: HOSPITAL | Age: 83
LOS: 5 days | Discharge: HOSPICE | End: 2023-06-26
Attending: EMERGENCY MEDICINE | Admitting: INTERNAL MEDICINE
Payer: MEDICARE

## 2023-06-20 DIAGNOSIS — R06.00 DYSPNEA, UNSPECIFIED TYPE: Primary | ICD-10-CM

## 2023-06-20 LAB
ALBUMIN SERPL-MCNC: 2.4 G/DL (ref 3.4–5)
ALBUMIN/GLOB SERPL: 0.4 {RATIO} (ref 1–2)
ALP LIVER SERPL-CCNC: 75 U/L
ALT SERPL-CCNC: 14 U/L
ANION GAP SERPL CALC-SCNC: 8 MMOL/L (ref 0–18)
AST SERPL-CCNC: 19 U/L (ref 15–37)
BASOPHILS # BLD AUTO: 0.05 X10(3) UL (ref 0–0.2)
BASOPHILS NFR BLD AUTO: 0.5 %
BILIRUB SERPL-MCNC: 0.2 MG/DL (ref 0.1–2)
BILIRUB UR QL: NEGATIVE
BUN BLD-MCNC: 18 MG/DL (ref 7–18)
BUN/CREAT SERPL: 37.5 (ref 10–20)
CALCIUM BLD-MCNC: 9 MG/DL (ref 8.5–10.1)
CHLORIDE SERPL-SCNC: 102 MMOL/L (ref 98–112)
CLARITY UR: CLEAR
CO2 SERPL-SCNC: 29 MMOL/L (ref 21–32)
CREAT BLD-MCNC: 0.48 MG/DL
DEPRECATED RDW RBC AUTO: 50.2 FL (ref 35.1–46.3)
EOSINOPHIL # BLD AUTO: 0.04 X10(3) UL (ref 0–0.7)
EOSINOPHIL NFR BLD AUTO: 0.4 %
ERYTHROCYTE [DISTWIDTH] IN BLOOD BY AUTOMATED COUNT: 14.1 % (ref 11–15)
FLUAV + FLUBV RNA SPEC NAA+PROBE: NEGATIVE
FLUAV + FLUBV RNA SPEC NAA+PROBE: NEGATIVE
GFR SERPLBLD BASED ON 1.73 SQ M-ARVRAT: 94 ML/MIN/1.73M2 (ref 60–?)
GLOBULIN PLAS-MCNC: 5.7 G/DL (ref 2.8–4.4)
GLUCOSE BLD-MCNC: 100 MG/DL (ref 70–99)
GLUCOSE UR-MCNC: NORMAL MG/DL
HCT VFR BLD AUTO: 38.1 %
HGB BLD-MCNC: 12 G/DL
HGB UR QL STRIP.AUTO: NEGATIVE
IMM GRANULOCYTES # BLD AUTO: 0.04 X10(3) UL (ref 0–1)
IMM GRANULOCYTES NFR BLD: 0.4 %
KETONES UR-MCNC: NEGATIVE MG/DL
LACTATE SERPL-SCNC: 1.8 MMOL/L (ref 0.4–2)
LEUKOCYTE ESTERASE UR QL STRIP.AUTO: NEGATIVE
LYMPHOCYTES # BLD AUTO: 2.59 X10(3) UL (ref 1–4)
LYMPHOCYTES NFR BLD AUTO: 25.1 %
MCH RBC QN AUTO: 30.7 PG (ref 26–34)
MCHC RBC AUTO-ENTMCNC: 31.5 G/DL (ref 31–37)
MCV RBC AUTO: 97.4 FL
MONOCYTES # BLD AUTO: 0.91 X10(3) UL (ref 0.1–1)
MONOCYTES NFR BLD AUTO: 8.8 %
NEUTROPHILS # BLD AUTO: 6.67 X10 (3) UL (ref 1.5–7.7)
NEUTROPHILS # BLD AUTO: 6.67 X10(3) UL (ref 1.5–7.7)
NEUTROPHILS NFR BLD AUTO: 64.8 %
NITRITE UR QL STRIP.AUTO: NEGATIVE
OSMOLALITY SERPL CALC.SUM OF ELEC: 290 MOSM/KG (ref 275–295)
PH UR: 6.5 [PH] (ref 5–8)
PLATELET # BLD AUTO: 274 10(3)UL (ref 150–450)
POTASSIUM SERPL-SCNC: 3.7 MMOL/L (ref 3.5–5.1)
PROT SERPL-MCNC: 8.1 G/DL (ref 6.4–8.2)
RBC # BLD AUTO: 3.91 X10(6)UL
RSV RNA SPEC NAA+PROBE: NEGATIVE
SARS-COV-2 RNA RESP QL NAA+PROBE: NOT DETECTED
SODIUM SERPL-SCNC: 139 MMOL/L (ref 136–145)
SP GR UR STRIP: 1.02 (ref 1–1.03)
TROPONIN I HIGH SENSITIVITY: 7 NG/L
UROBILINOGEN UR STRIP-ACNC: NORMAL
WBC # BLD AUTO: 10.3 X10(3) UL (ref 4–11)

## 2023-06-20 PROCEDURE — 71045 X-RAY EXAM CHEST 1 VIEW: CPT | Performed by: EMERGENCY MEDICINE

## 2023-06-20 PROCEDURE — 0241U SARS-COV-2/FLU A AND B/RSV BY PCR (GENEXPERT): CPT | Performed by: EMERGENCY MEDICINE

## 2023-06-20 PROCEDURE — 84484 ASSAY OF TROPONIN QUANT: CPT | Performed by: EMERGENCY MEDICINE

## 2023-06-20 PROCEDURE — 5A0955Z ASSISTANCE WITH RESPIRATORY VENTILATION, GREATER THAN 96 CONSECUTIVE HOURS: ICD-10-PCS | Performed by: INTERNAL MEDICINE

## 2023-06-20 PROCEDURE — 99285 EMERGENCY DEPT VISIT HI MDM: CPT

## 2023-06-20 PROCEDURE — 80053 COMPREHEN METABOLIC PANEL: CPT | Performed by: EMERGENCY MEDICINE

## 2023-06-20 PROCEDURE — 36415 COLL VENOUS BLD VENIPUNCTURE: CPT

## 2023-06-20 PROCEDURE — 73562 X-RAY EXAM OF KNEE 3: CPT | Performed by: EMERGENCY MEDICINE

## 2023-06-20 PROCEDURE — 96361 HYDRATE IV INFUSION ADD-ON: CPT

## 2023-06-20 PROCEDURE — 73030 X-RAY EXAM OF SHOULDER: CPT | Performed by: EMERGENCY MEDICINE

## 2023-06-20 PROCEDURE — 96374 THER/PROPH/DIAG INJ IV PUSH: CPT

## 2023-06-20 PROCEDURE — 81001 URINALYSIS AUTO W/SCOPE: CPT | Performed by: EMERGENCY MEDICINE

## 2023-06-20 PROCEDURE — 90471 IMMUNIZATION ADMIN: CPT

## 2023-06-20 PROCEDURE — 87040 BLOOD CULTURE FOR BACTERIA: CPT | Performed by: EMERGENCY MEDICINE

## 2023-06-20 PROCEDURE — 83605 ASSAY OF LACTIC ACID: CPT | Performed by: EMERGENCY MEDICINE

## 2023-06-20 PROCEDURE — 85025 COMPLETE CBC W/AUTO DIFF WBC: CPT | Performed by: EMERGENCY MEDICINE

## 2023-06-20 RX ORDER — HYDROCODONE BITARTRATE AND ACETAMINOPHEN 10; 325 MG/1; MG/1
1 TABLET ORAL ONCE
Status: COMPLETED | OUTPATIENT
Start: 2023-06-20 | End: 2023-06-21

## 2023-06-20 RX ORDER — ACETAMINOPHEN 500 MG
1000 TABLET ORAL ONCE
Status: COMPLETED | OUTPATIENT
Start: 2023-06-20 | End: 2023-06-20

## 2023-06-21 ENCOUNTER — APPOINTMENT (OUTPATIENT)
Dept: GENERAL RADIOLOGY | Facility: HOSPITAL | Age: 83
End: 2023-06-21
Attending: INTERNAL MEDICINE
Payer: MEDICARE

## 2023-06-21 ENCOUNTER — APPOINTMENT (OUTPATIENT)
Dept: ULTRASOUND IMAGING | Facility: HOSPITAL | Age: 83
End: 2023-06-21
Attending: INTERNAL MEDICINE
Payer: MEDICARE

## 2023-06-21 ENCOUNTER — APPOINTMENT (OUTPATIENT)
Dept: CT IMAGING | Facility: HOSPITAL | Age: 83
End: 2023-06-21
Attending: INTERNAL MEDICINE
Payer: MEDICARE

## 2023-06-21 ENCOUNTER — APPOINTMENT (OUTPATIENT)
Dept: CT IMAGING | Facility: HOSPITAL | Age: 83
End: 2023-06-21
Attending: EMERGENCY MEDICINE
Payer: MEDICARE

## 2023-06-21 PROBLEM — R06.00 DYSPNEA, UNSPECIFIED TYPE: Status: ACTIVE | Noted: 2023-06-21

## 2023-06-21 LAB
C DIFF TOX B STL QL: NEGATIVE
MAGNESIUM SERPL-MCNC: 1.5 MG/DL (ref 1.6–2.6)
MRSA DNA SPEC QL NAA+PROBE: NEGATIVE
NT-PROBNP SERPL-MCNC: 991 PG/ML (ref ?–450)
POTASSIUM SERPL-SCNC: 3.4 MMOL/L (ref 3.5–5.1)

## 2023-06-21 PROCEDURE — 72125 CT NECK SPINE W/O DYE: CPT | Performed by: EMERGENCY MEDICINE

## 2023-06-21 PROCEDURE — 83735 ASSAY OF MAGNESIUM: CPT | Performed by: INTERNAL MEDICINE

## 2023-06-21 PROCEDURE — 84132 ASSAY OF SERUM POTASSIUM: CPT | Performed by: INTERNAL MEDICINE

## 2023-06-21 PROCEDURE — 87641 MR-STAPH DNA AMP PROBE: CPT | Performed by: INTERNAL MEDICINE

## 2023-06-21 PROCEDURE — 88112 CYTOPATH CELL ENHANCE TECH: CPT | Performed by: INTERNAL MEDICINE

## 2023-06-21 PROCEDURE — 74176 CT ABD & PELVIS W/O CONTRAST: CPT | Performed by: INTERNAL MEDICINE

## 2023-06-21 PROCEDURE — 70450 CT HEAD/BRAIN W/O DYE: CPT | Performed by: EMERGENCY MEDICINE

## 2023-06-21 PROCEDURE — 87205 SMEAR GRAM STAIN: CPT | Performed by: INTERNAL MEDICINE

## 2023-06-21 PROCEDURE — 76604 US EXAM CHEST: CPT | Performed by: INTERNAL MEDICINE

## 2023-06-21 PROCEDURE — 87070 CULTURE OTHR SPECIMN AEROBIC: CPT | Performed by: INTERNAL MEDICINE

## 2023-06-21 PROCEDURE — 71250 CT THORAX DX C-: CPT | Performed by: INTERNAL MEDICINE

## 2023-06-21 PROCEDURE — 87493 C DIFF AMPLIFIED PROBE: CPT | Performed by: INTERNAL MEDICINE

## 2023-06-21 PROCEDURE — 83880 ASSAY OF NATRIURETIC PEPTIDE: CPT | Performed by: INTERNAL MEDICINE

## 2023-06-21 PROCEDURE — 88108 CYTOPATH CONCENTRATE TECH: CPT | Performed by: INTERNAL MEDICINE

## 2023-06-21 RX ORDER — LOPERAMIDE HYDROCHLORIDE 2 MG/1
2 CAPSULE ORAL EVERY MORNING
COMMUNITY

## 2023-06-21 RX ORDER — LOPERAMIDE HYDROCHLORIDE 2 MG/1
2 CAPSULE ORAL 4 TIMES DAILY PRN
Status: DISCONTINUED | OUTPATIENT
Start: 2023-06-21 | End: 2023-06-26

## 2023-06-21 RX ORDER — ALPRAZOLAM 0.5 MG/1
0.5 TABLET ORAL 2 TIMES DAILY PRN
Status: DISCONTINUED | OUTPATIENT
Start: 2023-06-21 | End: 2023-06-26

## 2023-06-21 RX ORDER — BISACODYL 10 MG
10 SUPPOSITORY, RECTAL RECTAL
Status: DISCONTINUED | OUTPATIENT
Start: 2023-06-21 | End: 2023-06-26

## 2023-06-21 RX ORDER — BENZONATATE 100 MG/1
200 CAPSULE ORAL 3 TIMES DAILY PRN
Status: DISCONTINUED | OUTPATIENT
Start: 2023-06-21 | End: 2023-06-26

## 2023-06-21 RX ORDER — ENEMA 19; 7 G/133ML; G/133ML
1 ENEMA RECTAL ONCE AS NEEDED
Status: DISCONTINUED | OUTPATIENT
Start: 2023-06-21 | End: 2023-06-26

## 2023-06-21 RX ORDER — HYDROCODONE BITARTRATE AND ACETAMINOPHEN 10; 325 MG/1; MG/1
1 TABLET ORAL EVERY 6 HOURS PRN
Status: DISCONTINUED | OUTPATIENT
Start: 2023-06-21 | End: 2023-06-26

## 2023-06-21 RX ORDER — HYDRALAZINE HYDROCHLORIDE 20 MG/ML
10 INJECTION INTRAMUSCULAR; INTRAVENOUS EVERY 6 HOURS PRN
Status: DISCONTINUED | OUTPATIENT
Start: 2023-06-21 | End: 2023-06-26

## 2023-06-21 RX ORDER — METOCLOPRAMIDE HYDROCHLORIDE 5 MG/ML
10 INJECTION INTRAMUSCULAR; INTRAVENOUS EVERY 8 HOURS PRN
Status: DISCONTINUED | OUTPATIENT
Start: 2023-06-21 | End: 2023-06-26

## 2023-06-21 RX ORDER — SENNOSIDES 8.6 MG
17.2 TABLET ORAL NIGHTLY PRN
Status: DISCONTINUED | OUTPATIENT
Start: 2023-06-21 | End: 2023-06-26

## 2023-06-21 RX ORDER — VANCOMYCIN HYDROCHLORIDE
25 ONCE
Status: COMPLETED | OUTPATIENT
Start: 2023-06-21 | End: 2023-06-21

## 2023-06-21 RX ORDER — DESVENLAFAXINE 25 MG/1
100 TABLET, EXTENDED RELEASE ORAL DAILY
Status: DISCONTINUED | OUTPATIENT
Start: 2023-06-21 | End: 2023-06-26

## 2023-06-21 RX ORDER — MELATONIN
3 NIGHTLY PRN
Status: DISCONTINUED | OUTPATIENT
Start: 2023-06-21 | End: 2023-06-26

## 2023-06-21 RX ORDER — ENOXAPARIN SODIUM 100 MG/ML
40 INJECTION SUBCUTANEOUS DAILY
Status: DISCONTINUED | OUTPATIENT
Start: 2023-06-21 | End: 2023-06-26

## 2023-06-21 RX ORDER — ATORVASTATIN CALCIUM 20 MG/1
20 TABLET, FILM COATED ORAL NIGHTLY
Status: DISCONTINUED | OUTPATIENT
Start: 2023-06-21 | End: 2023-06-26

## 2023-06-21 RX ORDER — METOPROLOL SUCCINATE 100 MG/1
100 TABLET, EXTENDED RELEASE ORAL
Status: DISCONTINUED | OUTPATIENT
Start: 2023-06-21 | End: 2023-06-26

## 2023-06-21 RX ORDER — ONDANSETRON 2 MG/ML
4 INJECTION INTRAMUSCULAR; INTRAVENOUS EVERY 6 HOURS PRN
Status: DISCONTINUED | OUTPATIENT
Start: 2023-06-21 | End: 2023-06-26

## 2023-06-21 RX ORDER — ECHINACEA PURPUREA EXTRACT 125 MG
1 TABLET ORAL
Status: DISCONTINUED | OUTPATIENT
Start: 2023-06-21 | End: 2023-06-26

## 2023-06-21 RX ORDER — POLYETHYLENE GLYCOL 3350 17 G/17G
17 POWDER, FOR SOLUTION ORAL DAILY PRN
Status: DISCONTINUED | OUTPATIENT
Start: 2023-06-21 | End: 2023-06-26

## 2023-06-21 RX ORDER — ACETAMINOPHEN 500 MG
500 TABLET ORAL EVERY 4 HOURS PRN
Status: DISCONTINUED | OUTPATIENT
Start: 2023-06-21 | End: 2023-06-26

## 2023-06-21 RX ORDER — CETIRIZINE HYDROCHLORIDE 10 MG/1
10 TABLET ORAL DAILY
Status: DISCONTINUED | OUTPATIENT
Start: 2023-06-21 | End: 2023-06-26

## 2023-06-22 ENCOUNTER — APPOINTMENT (OUTPATIENT)
Dept: GENERAL RADIOLOGY | Facility: HOSPITAL | Age: 83
End: 2023-06-22
Attending: INTERNAL MEDICINE
Payer: MEDICARE

## 2023-06-22 LAB
ANION GAP SERPL CALC-SCNC: 6 MMOL/L (ref 0–18)
BUN BLD-MCNC: 8 MG/DL (ref 7–18)
BUN/CREAT SERPL: 19.5 (ref 10–20)
CALCIUM BLD-MCNC: 8 MG/DL (ref 8.5–10.1)
CHLORIDE SERPL-SCNC: 108 MMOL/L (ref 98–112)
CO2 SERPL-SCNC: 27 MMOL/L (ref 21–32)
CREAT BLD-MCNC: 0.41 MG/DL
DEPRECATED RDW RBC AUTO: 50.3 FL (ref 35.1–46.3)
ERYTHROCYTE [DISTWIDTH] IN BLOOD BY AUTOMATED COUNT: 14.1 % (ref 11–15)
GFR SERPLBLD BASED ON 1.73 SQ M-ARVRAT: 98 ML/MIN/1.73M2 (ref 60–?)
GLUCOSE BLD-MCNC: 103 MG/DL (ref 70–99)
HCT VFR BLD AUTO: 35.1 %
HGB BLD-MCNC: 11 G/DL
MAGNESIUM SERPL-MCNC: 2.5 MG/DL (ref 1.6–2.6)
MCH RBC QN AUTO: 30.6 PG (ref 26–34)
MCHC RBC AUTO-ENTMCNC: 31.3 G/DL (ref 31–37)
MCV RBC AUTO: 97.8 FL
OSMOLALITY SERPL CALC.SUM OF ELEC: 291 MOSM/KG (ref 275–295)
PLATELET # BLD AUTO: 288 10(3)UL (ref 150–450)
POTASSIUM SERPL-SCNC: 3.9 MMOL/L (ref 3.5–5.1)
POTASSIUM SERPL-SCNC: 3.9 MMOL/L (ref 3.5–5.1)
RBC # BLD AUTO: 3.59 X10(6)UL
SODIUM SERPL-SCNC: 141 MMOL/L (ref 136–145)
WBC # BLD AUTO: 8.4 X10(3) UL (ref 4–11)

## 2023-06-22 PROCEDURE — 80048 BASIC METABOLIC PNL TOTAL CA: CPT | Performed by: INTERNAL MEDICINE

## 2023-06-22 PROCEDURE — 84132 ASSAY OF SERUM POTASSIUM: CPT | Performed by: INTERNAL MEDICINE

## 2023-06-22 PROCEDURE — 71046 X-RAY EXAM CHEST 2 VIEWS: CPT | Performed by: INTERNAL MEDICINE

## 2023-06-22 PROCEDURE — 85027 COMPLETE CBC AUTOMATED: CPT | Performed by: INTERNAL MEDICINE

## 2023-06-22 PROCEDURE — 83735 ASSAY OF MAGNESIUM: CPT | Performed by: INTERNAL MEDICINE

## 2023-06-22 RX ORDER — LOPERAMIDE HYDROCHLORIDE 2 MG/1
2 CAPSULE ORAL ONCE
Status: COMPLETED | OUTPATIENT
Start: 2023-06-22 | End: 2023-06-22

## 2023-06-23 ENCOUNTER — ANESTHESIA EVENT (OUTPATIENT)
Dept: ENDOSCOPY | Facility: HOSPITAL | Age: 83
End: 2023-06-23
Payer: MEDICARE

## 2023-06-23 ENCOUNTER — ANESTHESIA (OUTPATIENT)
Dept: ENDOSCOPY | Facility: HOSPITAL | Age: 83
End: 2023-06-23
Payer: MEDICARE

## 2023-06-23 LAB
ANION GAP SERPL CALC-SCNC: 5 MMOL/L (ref 0–18)
BASOPHILS NFR BRONCH: 0 %
BUN BLD-MCNC: 6 MG/DL (ref 7–18)
BUN/CREAT SERPL: 11.1 (ref 10–20)
CALCIUM BLD-MCNC: 8.4 MG/DL (ref 8.5–10.1)
CHLORIDE SERPL-SCNC: 107 MMOL/L (ref 98–112)
CO2 SERPL-SCNC: 31 MMOL/L (ref 21–32)
CREAT BLD-MCNC: 0.54 MG/DL
DEPRECATED RDW RBC AUTO: 51.2 FL (ref 35.1–46.3)
EOSINOPHIL NFR BRONCH: 0 %
ERYTHROCYTE [DISTWIDTH] IN BLOOD BY AUTOMATED COUNT: 14.2 % (ref 11–15)
GFR SERPLBLD BASED ON 1.73 SQ M-ARVRAT: 91 ML/MIN/1.73M2 (ref 60–?)
GLUCOSE BLD-MCNC: 109 MG/DL (ref 70–99)
HCT VFR BLD AUTO: 37.4 %
HGB BLD-MCNC: 11.6 G/DL
LYMPHOCYTES NFR BRONCH: 13 %
MCH RBC QN AUTO: 30.4 PG (ref 26–34)
MCHC RBC AUTO-ENTMCNC: 31 G/DL (ref 31–37)
MCV RBC AUTO: 98.2 FL
MONOS+MACROS NFR BRONCH: 24 %
NEUTROPHILS NFR BRONCH: 50 %
OSMOLALITY SERPL CALC.SUM OF ELEC: 294 MOSM/KG (ref 275–295)
PLATELET # BLD AUTO: 280 10(3)UL (ref 150–450)
POTASSIUM SERPL-SCNC: 3.7 MMOL/L (ref 3.5–5.1)
RBC # BLD AUTO: 3.81 X10(6)UL
RBC # FLD: 4013 /CUMM (ref ?–1)
SODIUM SERPL-SCNC: 143 MMOL/L (ref 136–145)
TOTAL CELLS COUNTED BRONCH: 77 /CUMM (ref ?–1)
TOTAL CELLS COUNTED FLD: 100
WBC # BLD AUTO: 8 X10(3) UL (ref 4–11)
WBC CALC (IRIS) BRW: 67 /CUMM
WBC OTHER NFR BRONCH: 13 %

## 2023-06-23 PROCEDURE — 87205 SMEAR GRAM STAIN: CPT | Performed by: INTERNAL MEDICINE

## 2023-06-23 PROCEDURE — 88177 CYTP FNA EVAL EA ADDL: CPT | Performed by: INTERNAL MEDICINE

## 2023-06-23 PROCEDURE — 87071 CULTURE AEROBIC QUANT OTHER: CPT | Performed by: INTERNAL MEDICINE

## 2023-06-23 PROCEDURE — 87206 SMEAR FLUORESCENT/ACID STAI: CPT | Performed by: INTERNAL MEDICINE

## 2023-06-23 PROCEDURE — 07978ZX DRAINAGE OF THORAX LYMPHATIC, VIA NATURAL OR ARTIFICIAL OPENING ENDOSCOPIC APPROACH, DIAGNOSTIC: ICD-10-PCS | Performed by: INTERNAL MEDICINE

## 2023-06-23 PROCEDURE — 80048 BASIC METABOLIC PNL TOTAL CA: CPT | Performed by: INTERNAL MEDICINE

## 2023-06-23 PROCEDURE — 88305 TISSUE EXAM BY PATHOLOGIST: CPT | Performed by: INTERNAL MEDICINE

## 2023-06-23 PROCEDURE — 88172 CYTP DX EVAL FNA 1ST EA SITE: CPT | Performed by: INTERNAL MEDICINE

## 2023-06-23 PROCEDURE — 85027 COMPLETE CBC AUTOMATED: CPT | Performed by: INTERNAL MEDICINE

## 2023-06-23 PROCEDURE — 0B9H8ZX DRAINAGE OF LUNG LINGULA, VIA NATURAL OR ARTIFICIAL OPENING ENDOSCOPIC, DIAGNOSTIC: ICD-10-PCS | Performed by: INTERNAL MEDICINE

## 2023-06-23 PROCEDURE — 88342 IMHCHEM/IMCYTCHM 1ST ANTB: CPT | Performed by: INTERNAL MEDICINE

## 2023-06-23 PROCEDURE — 88112 CYTOPATH CELL ENHANCE TECH: CPT | Performed by: INTERNAL MEDICINE

## 2023-06-23 PROCEDURE — 88341 IMHCHEM/IMCYTCHM EA ADD ANTB: CPT | Performed by: INTERNAL MEDICINE

## 2023-06-23 PROCEDURE — 88173 CYTOPATH EVAL FNA REPORT: CPT | Performed by: INTERNAL MEDICINE

## 2023-06-23 PROCEDURE — 89051 BODY FLUID CELL COUNT: CPT | Performed by: INTERNAL MEDICINE

## 2023-06-23 PROCEDURE — 87102 FUNGUS ISOLATION CULTURE: CPT | Performed by: INTERNAL MEDICINE

## 2023-06-23 PROCEDURE — 89050 BODY FLUID CELL COUNT: CPT | Performed by: INTERNAL MEDICINE

## 2023-06-23 PROCEDURE — 0BDG8ZX EXTRACTION OF LEFT UPPER LUNG LOBE, VIA NATURAL OR ARTIFICIAL OPENING ENDOSCOPIC, DIAGNOSTIC: ICD-10-PCS | Performed by: INTERNAL MEDICINE

## 2023-06-23 RX ORDER — PHENYLEPHRINE HCL 10 MG/ML
VIAL (ML) INJECTION AS NEEDED
Status: DISCONTINUED | OUTPATIENT
Start: 2023-06-23 | End: 2023-06-23 | Stop reason: SURG

## 2023-06-23 RX ORDER — SODIUM CHLORIDE, SODIUM LACTATE, POTASSIUM CHLORIDE, CALCIUM CHLORIDE 600; 310; 30; 20 MG/100ML; MG/100ML; MG/100ML; MG/100ML
INJECTION, SOLUTION INTRAVENOUS CONTINUOUS
Status: DISCONTINUED | OUTPATIENT
Start: 2023-06-23 | End: 2023-06-23 | Stop reason: HOSPADM

## 2023-06-23 RX ORDER — NALOXONE HYDROCHLORIDE 0.4 MG/ML
80 INJECTION, SOLUTION INTRAMUSCULAR; INTRAVENOUS; SUBCUTANEOUS AS NEEDED
Status: DISCONTINUED | OUTPATIENT
Start: 2023-06-23 | End: 2023-06-23 | Stop reason: HOSPADM

## 2023-06-23 RX ORDER — LIDOCAINE HYDROCHLORIDE 40 MG/ML
SOLUTION TOPICAL AS NEEDED
Status: DISCONTINUED | OUTPATIENT
Start: 2023-06-23 | End: 2023-06-23 | Stop reason: SURG

## 2023-06-23 RX ORDER — LIDOCAINE HYDROCHLORIDE 10 MG/ML
INJECTION, SOLUTION EPIDURAL; INFILTRATION; INTRACAUDAL; PERINEURAL AS NEEDED
Status: DISCONTINUED | OUTPATIENT
Start: 2023-06-23 | End: 2023-06-23 | Stop reason: SURG

## 2023-06-23 RX ORDER — POTASSIUM CHLORIDE 20 MEQ/1
40 TABLET, EXTENDED RELEASE ORAL ONCE
Status: COMPLETED | OUTPATIENT
Start: 2023-06-23 | End: 2023-06-23

## 2023-06-23 RX ORDER — DEXAMETHASONE SODIUM PHOSPHATE 4 MG/ML
VIAL (ML) INJECTION AS NEEDED
Status: DISCONTINUED | OUTPATIENT
Start: 2023-06-23 | End: 2023-06-23 | Stop reason: SURG

## 2023-06-23 RX ORDER — SODIUM CHLORIDE, SODIUM LACTATE, POTASSIUM CHLORIDE, CALCIUM CHLORIDE 600; 310; 30; 20 MG/100ML; MG/100ML; MG/100ML; MG/100ML
INJECTION, SOLUTION INTRAVENOUS CONTINUOUS PRN
Status: DISCONTINUED | OUTPATIENT
Start: 2023-06-23 | End: 2023-06-23 | Stop reason: SURG

## 2023-06-23 RX ORDER — ROCURONIUM BROMIDE 10 MG/ML
INJECTION, SOLUTION INTRAVENOUS AS NEEDED
Status: DISCONTINUED | OUTPATIENT
Start: 2023-06-23 | End: 2023-06-23 | Stop reason: SURG

## 2023-06-23 RX ADMIN — SODIUM CHLORIDE, SODIUM LACTATE, POTASSIUM CHLORIDE, CALCIUM CHLORIDE: 600; 310; 30; 20 INJECTION, SOLUTION INTRAVENOUS at 13:53:00

## 2023-06-23 RX ADMIN — SODIUM CHLORIDE, SODIUM LACTATE, POTASSIUM CHLORIDE, CALCIUM CHLORIDE: 600; 310; 30; 20 INJECTION, SOLUTION INTRAVENOUS at 12:42:00

## 2023-06-23 RX ADMIN — ROCURONIUM BROMIDE 30 MG: 10 INJECTION, SOLUTION INTRAVENOUS at 12:47:00

## 2023-06-23 RX ADMIN — PHENYLEPHRINE HCL 100 MCG: 10 MG/ML VIAL (ML) INJECTION at 13:39:00

## 2023-06-23 RX ADMIN — PHENYLEPHRINE HCL 100 MCG: 10 MG/ML VIAL (ML) INJECTION at 13:08:00

## 2023-06-23 RX ADMIN — PHENYLEPHRINE HCL 100 MCG: 10 MG/ML VIAL (ML) INJECTION at 13:14:00

## 2023-06-23 RX ADMIN — LIDOCAINE HYDROCHLORIDE 4 ML: 40 SOLUTION TOPICAL at 12:50:00

## 2023-06-23 RX ADMIN — PHENYLEPHRINE HCL 100 MCG: 10 MG/ML VIAL (ML) INJECTION at 13:27:00

## 2023-06-23 RX ADMIN — PHENYLEPHRINE HCL 100 MCG: 10 MG/ML VIAL (ML) INJECTION at 13:30:00

## 2023-06-23 RX ADMIN — PHENYLEPHRINE HCL 50 MCG: 10 MG/ML VIAL (ML) INJECTION at 13:00:00

## 2023-06-23 RX ADMIN — PHENYLEPHRINE HCL 100 MCG: 10 MG/ML VIAL (ML) INJECTION at 13:33:00

## 2023-06-23 RX ADMIN — DEXAMETHASONE SODIUM PHOSPHATE 4 MG: 4 MG/ML VIAL (ML) INJECTION at 12:52:00

## 2023-06-23 RX ADMIN — PHENYLEPHRINE HCL 100 MCG: 10 MG/ML VIAL (ML) INJECTION at 13:20:00

## 2023-06-23 RX ADMIN — LIDOCAINE HYDROCHLORIDE 25 MG: 10 INJECTION, SOLUTION EPIDURAL; INFILTRATION; INTRACAUDAL; PERINEURAL at 12:47:00

## 2023-06-23 NOTE — ANESTHESIA PROCEDURE NOTES
Airway  Date/Time: 6/23/2023 12:50 PM  Urgency: elective    Airway not difficult    General Information and Staff    Patient location during procedure: endo  Resident/CRNA: Janak Hernández CRNA  Performed: CRNA   Performed by: Janak Hernández CRNA  Authorized by: Janak Hernández CRNA      Indications and Patient Condition  Indications for airway management: anesthesia  Sedation level: deep  Preoxygenated: yes  Patient position: sniffing  Mask difficulty assessment: 1 - vent by mask  No planned trial extubation    Final Airway Details  Final airway type: endotracheal airway      Successful airway: ETT  Cuffed: yes   Successful intubation technique: direct laryngoscopy  Facilitating devices/methods: intubating stylet  Endotracheal tube insertion site: oral  Blade: Lois  Blade size: #3  ETT size (mm): 8.0    Cormack-Lehane Classification: grade I - full view of glottis  Placement verified by: capnometry   Measured from: lips  ETT to lips (cm): 21    Additional Comments  Atraumatic intubation on first attempt, teeth and lips appear in preop condition. Plastic endo bite block placed between teeth and secured, tongue free from pressure.

## 2023-06-24 LAB
ANION GAP SERPL CALC-SCNC: 2 MMOL/L (ref 0–18)
BUN BLD-MCNC: 9 MG/DL (ref 7–18)
BUN/CREAT SERPL: 20 (ref 10–20)
CALCIUM BLD-MCNC: 8.2 MG/DL (ref 8.5–10.1)
CHLORIDE SERPL-SCNC: 109 MMOL/L (ref 98–112)
CO2 SERPL-SCNC: 32 MMOL/L (ref 21–32)
CREAT BLD-MCNC: 0.45 MG/DL
DEPRECATED RDW RBC AUTO: 50.8 FL (ref 35.1–46.3)
ERYTHROCYTE [DISTWIDTH] IN BLOOD BY AUTOMATED COUNT: 14.1 % (ref 11–15)
GFR SERPLBLD BASED ON 1.73 SQ M-ARVRAT: 95 ML/MIN/1.73M2 (ref 60–?)
GLUCOSE BLD-MCNC: 128 MG/DL (ref 70–99)
HCT VFR BLD AUTO: 32.9 %
HGB BLD-MCNC: 10.3 G/DL
MCH RBC QN AUTO: 30.8 PG (ref 26–34)
MCHC RBC AUTO-ENTMCNC: 31.3 G/DL (ref 31–37)
MCV RBC AUTO: 98.5 FL
OSMOLALITY SERPL CALC.SUM OF ELEC: 296 MOSM/KG (ref 275–295)
PLATELET # BLD AUTO: 301 10(3)UL (ref 150–450)
POTASSIUM SERPL-SCNC: 4.1 MMOL/L (ref 3.5–5.1)
POTASSIUM SERPL-SCNC: 4.1 MMOL/L (ref 3.5–5.1)
RBC # BLD AUTO: 3.34 X10(6)UL
SODIUM SERPL-SCNC: 143 MMOL/L (ref 136–145)
WBC # BLD AUTO: 6.2 X10(3) UL (ref 4–11)

## 2023-06-24 PROCEDURE — 80048 BASIC METABOLIC PNL TOTAL CA: CPT | Performed by: INTERNAL MEDICINE

## 2023-06-24 PROCEDURE — 85027 COMPLETE CBC AUTOMATED: CPT | Performed by: INTERNAL MEDICINE

## 2023-06-24 PROCEDURE — 84132 ASSAY OF SERUM POTASSIUM: CPT | Performed by: INTERNAL MEDICINE

## 2023-06-26 ENCOUNTER — MED REC SCAN ONLY (OUTPATIENT)
Dept: PHYSICAL MEDICINE AND REHAB | Facility: CLINIC | Age: 83
End: 2023-06-26

## 2023-06-26 VITALS
BODY MASS INDEX: 20.62 KG/M2 | WEIGHT: 105 LBS | TEMPERATURE: 99 F | OXYGEN SATURATION: 93 % | HEART RATE: 79 BPM | SYSTOLIC BLOOD PRESSURE: 137 MMHG | RESPIRATION RATE: 16 BRPM | DIASTOLIC BLOOD PRESSURE: 82 MMHG | HEIGHT: 60 IN

## 2023-06-26 RX ORDER — CEFDINIR 300 MG/1
300 CAPSULE ORAL 2 TIMES DAILY
Qty: 28 CAPSULE | Refills: 0 | Status: SHIPPED | OUTPATIENT
Start: 2023-06-26

## 2024-01-16 NOTE — PROGRESS NOTES
Reviewed and noted [Patient] : the patient [FreeTextEntry3] : will schedule visit with Dr. Sears  [EKG obtained to assist in diagnosis and management of assessed problem(s)] : EKG obtained to assist in diagnosis and management of assessed problem(s)
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
## (undated) DIAGNOSIS — M54.17 LUMBOSACRAL RADICULOPATHY AT L5: Primary | ICD-10-CM

## (undated) DIAGNOSIS — M54.17 LUMBOSACRAL RADICULOPATHY AT L5: ICD-10-CM

## (undated) DEVICE — STANDARD HYPODERMIC NEEDLE,POLYPROPYLENE HUB: Brand: MONOJECT

## (undated) DEVICE — SPINOCAN® 22 GA. X 3-1/2 IN. (90 MM) SPINAL NEEDLE: Brand: SPINOCAN®

## (undated) DEVICE — GAUZE SPONGES,12 PLY: Brand: CURITY

## (undated) DEVICE — BEVEL STYLET: Brand: IVAS

## (undated) DEVICE — PEN: MARKING STD PT 100/CS: Brand: MEDICAL ACTION INDUSTRIES

## (undated) DEVICE — 60 ML SYRINGE CATHETER TIP: Brand: MONOJECT

## (undated) DEVICE — FLOSEAL HEMOSTATIC MATRIX, 5ML: Brand: FLOSEAL HEMOSTATIC MATRIX

## (undated) DEVICE — PROXIMATE RH ROTATING HEAD SKIN STAPLERS (35 WIDE) CONTAINS 35 STAINLESS STEEL STAPLES: Brand: PROXIMATE

## (undated) DEVICE — PERIFIX® 0.2 µM  FLAT EPIDURAL FILTER - FOR USE WITH ALL PERIFIX CATHETERS AND PERIFIX PINPAD™: Brand: PERIFIX®

## (undated) DEVICE — NDL ASP 21GA 2MM STRL DISP

## (undated) DEVICE — COVER,MAYO STAND,STERILE: Brand: MEDLINE

## (undated) DEVICE — STANDARD HYPODERMIC NEEDLE,ALUMINUM HUB: Brand: MONOJECT

## (undated) DEVICE — Device: Brand: BALLOON

## (undated) DEVICE — TRANSPOSAL ULTRAFLEX DUO/QUAD ULTRA CART MANIFOLD

## (undated) DEVICE — LINE MNTR ADLT SET O2 INTMD

## (undated) DEVICE — SYRINGE MNJCT 10ML LF STRL REG

## (undated) DEVICE — GAMMEX® PI HYBRID SIZE 7, STERILE POWDER-FREE SURGICAL GLOVE, POLYISOPRENE AND NEOPRENE BLEND: Brand: GAMMEX

## (undated) DEVICE — 11.1-M5 MULTIMODALITY KIT 5

## (undated) DEVICE — MEDI-VAC NON-CONDUCTIVE SUCTION TUBING: Brand: CARDINAL HEALTH

## (undated) DEVICE — PERIFIX® 18 GA. X 3-1/2 IN. (90 MM) TUOHY, 5 ML GLASS LUER LOCK LOR TRAY (KIT): Brand: PERIFIX®

## (undated) DEVICE — Device

## (undated) DEVICE — SUTURE VICRYL 1 CT-1

## (undated) DEVICE — CONTAINER SPEC CLIKSEAL 4OZ

## (undated) DEVICE — ACCESS CANNULA: Brand: IVAS

## (undated) DEVICE — CEMENT PUSHER 5.0-5.8MM: Brand: SPINEJACK

## (undated) DEVICE — KENDALL SCD EXPRESS SLEEVES, KNEE LENGTH, MEDIUM: Brand: KENDALL SCD

## (undated) DEVICE — NVM5 PROBE SNGL USE STER PKG

## (undated) DEVICE — SOL  .9 1000ML BTL

## (undated) DEVICE — CHLORAPREP 26ML APPLICATOR

## (undated) DEVICE — GOWN SURG AERO CHROME XXL

## (undated) DEVICE — 6 ML SYRINGE LUER-LOCK TIP: Brand: MONOJECT

## (undated) DEVICE — SUTURE VICRYL 2-0 CP-2

## (undated) DEVICE — SINGLE USE BIOPSY VALVE MAJ-210: Brand: SINGLE USE BIOPSY VALVE (STERILE)

## (undated) DEVICE — SYRINGE 10ML SLIP TIP

## (undated) DEVICE — 3.0MM PRECISION NEURO (MATCH HEAD)

## (undated) DEVICE — DERMABOND LIQUID ADHESIVE

## (undated) DEVICE — LAMINECTOMY CDS: Brand: MEDLINE INDUSTRIES, INC.

## (undated) DEVICE — DISPOSABLE CYTOLOGY BRUSH: Brand: DISPOSABLE CYTOLOGY BRUSH

## (undated) DEVICE — 3M™ TEGADERM™ TRANSPARENT FILM DRESSING, 1626W, 4 IN X 4-3/4 IN (10 CM X 12 CM), 50 EACH/CARTON, 4 CARTON/CASE: Brand: 3M™ TEGADERM™

## (undated) DEVICE — 3.0MM NEURO (MATCH HEAD) SOFT TOUCH

## (undated) DEVICE — MASK PROC MASK SOFT WHITE

## (undated) DEVICE — 3M™ IOBAN™ 2 ANTIMICROBIAL INCISE DRAPE 6650EZ: Brand: IOBAN™ 2

## (undated) DEVICE — KIT CLEAN ENDOKIT 1.1OZ GOWNX2

## (undated) DEVICE — STERILE POLYISOPRENE POWDER-FREE SURGICAL GLOVES: Brand: PROTEXIS

## (undated) DEVICE — Device: Brand: MEDEX

## (undated) DEVICE — DRAIN SILICONE RND 1/8

## (undated) DEVICE — DRAPE TABLE COVER 44X90 TC-10

## (undated) DEVICE — 3 ML SYRINGE LUER-LOCK TIP: Brand: MONOJECT

## (undated) DEVICE — CHLORAPREP ORANGE TINT 10.5ML

## (undated) DEVICE — DRAIN RELIAVAC 100CC

## (undated) DEVICE — THE MILL DISPOSABLE - FINE

## (undated) DEVICE — SET XTN .1IN 2.7ML 20IN IV

## (undated) DEVICE — AIRLIFE™ MISTY FAST™ SMALL VOLUME NEBULIZER WITH 7 FOOT (2.1 M) CRUSH RESISTANT OXYGEN TUBING, BAFFLED MOUTHPIECE, AND 6 INCH (15 CM) FLEXTUBE: Brand: AIRLIFE™

## (undated) DEVICE — NITINOL K-WIRE BLUNT: Brand: ES2 SPINAL SYSTEM

## (undated) DEVICE — 35 ML SYRINGE REGULAR TIP: Brand: MONOJECT

## (undated) DEVICE — OMNIPAQUE 240ML VIAL

## (undated) DEVICE — DRAPE C-ARM UNIVERSAL

## (undated) DEVICE — 20 ML SYRINGE LUER-LOCK TIP: Brand: MONOJECT

## (undated) DEVICE — SINGLE USE SUCTION VALVE MAJ-209: Brand: SINGLE USE SUCTION VALVE (STERILE)

## (undated) DEVICE — TOWEL OR BLU 16X26 STRL

## (undated) DEVICE — MAXCESS MAS TLIF 2 KIT ACCESS

## (undated) DEVICE — LIGHT HANDLE

## (undated) DEVICE — CAUTERY BLADE 2IN INS E1455

## (undated) DEVICE — CONMED SCOPE SAVER BITE BLOCK, 20X27 MM: Brand: SCOPE SAVER

## (undated) DEVICE — AUTOPLEX SYSTEM WITHOUT NEEDLES

## (undated) DEVICE — YANKAUER SUCTION INSTRUMENT NO CONTROL VENT, BULB TIP, CLEAR: Brand: YANKAUER

## (undated) DEVICE — SUTURE VICRYL 0 CT-1

## (undated) NOTE — LETTER
11/18/2022      [facility] Service A. Naoma Dance, MD  Physical Medicine and Rehabilitation  [address] [facility], [address] [place] [address]  [place] [id number]  Dept: [phone]  Dept Fax: [phone]        RE: Consultation for Arlin Garcia        Dear Abdullahi Sim MD,    Thank you very much for the opportunity to see your patient. Attached please find a summary from your patient's recent visit. I appreciate the chance to take care of your patient with you. Please feel free to call me with any questions or concerns. Sincerely,        Candace Wheatley MD  Electronically Signed on 11/18/2022

## (undated) NOTE — LETTER
AUTHORIZATION FOR SURGICAL OPERATION OR OTHER PROCEDURE    1. I hereby authorize Dr. Thelma Nunez and the [facility] staff assigned to my case to perform the following operation and/or procedure at the [facility]:      2. My physician has explained the nature and purpose of the operation or other procedure, possible alternative methods of treatment, the risks involved, and the possibility of complication to me. I acknowledge that no guarantee has been made as to the result that may be obtained. 3.  I recognize that, during the course of this operation, or other procedure, unforseen conditions may necessitate additional or different procedure than those listed above. I, therefore, further authorize and request that the above named physician, his/her physician assistants or designees perform such procedures as are, in his/her professional opinion, necessary and desirable. 4.  Any tissue or organs removed in the operation or other procedure may be disposed of by and at the discretion of the [facility] staff and [facility]. 5.  I understand that in the event of a medical emergency, I will be transported by local paramedics to [facility] or other hospital emergency department. 6.  I certify that I have read and fully understand the above consent to operation and/or other procedure. 7.  I acknowledge that my physician has explained sedation/analgesia administration to me including the risks and benefits. I consent to the administration of sedation/analgesia as may be necessary or desirable in the judgement of my physician. Witness signature: ___________________________________________________ Date:  ______/______/_____                    Time:  ________ A. M.  P.M.         Patient signature:  ___________________________________________________               Statement of Physician  My signature below affirms that prior to the time of the procedure, I have explained to the patient and/or his/her guardian, the risks and benefits involved in the proposed treatment and any reasonable alternative to the proposed treatment. I have also explained the risks and benefits involved in the refusal of the proposed treatment and have answered the patient's questions.                         Date:  ______/______/_______  Provider                      Signature:  __________________________________________________________       Time:  ___________ A.M    P.M.

## (undated) NOTE — LETTER
1/13/2023      Mejiadavid Francois MD  Physical Medicine and Rehabilitation  [address] [facility], [address] [place] [address]  Dept: [phone]  Dept Fax: [phone]        RE: Consultation for Renae Laws        Dear Katie Berrios MD,    Thank you very much for the opportunity to see your patient. Attached please find a summary from your patient's recent visit. I appreciate the chance to take care of your patient with you. Please feel free to call me with any questions or concerns. Sincerely,        Cristhian Fuentes.  Abril Francois MD  Electronically Signed on 1/13/2023

## (undated) NOTE — LETTER
OPIOID TREATMENT AGREEMENT    For Pain Management      Please read each statement, initial at the bottom of each page, and sign the last page to indicate your agreement with this form.  If you have any questions about any information in this form or the b. I understand that decreasing or stopping my medication without the close supervision of my physician can lead to withdrawal. Withdrawal symptoms can include yawning, sweating, watery eyes, runny nose, anxiety, tremors, aching muscles, hot and cold flash 7. I understand that any evidence of drug hoarding, acquisition of any opioid medication or adjunctive analgesia from other physicians (which includes emergency rooms), uncontrolled dose escalation or reduction, loss of prescriptions or failure to follow a d. Refills issued by physicians/health care providers in this clinic can only be filled by a pharmacy in the [facility], even if I am a resident of another state.    e. Prescriptions for pain medicine or any other prescriptions will be written only b. Addiction is a primary, chronic neurobiologic disease with genetic, psychosocial and environmental factors influencing its development and manifestation.   It is characterized by behavior that includes one or more of the following: impaired control over 14. I agree and understand that my physician reserves the right to perform random or unannounced urine drug testing. If requested to provide a urine sample, I agree to cooperate.   If I decide not to provide a urine sample, I understand that my physician m ______________________________    ____________    ______________________________              Patient Signature                                  Date                        Patient Printed Name  ______________________________    ____________    ___________

## (undated) NOTE — LETTER
1/3/2023      Tony Lopez MD  Physical Medicine and Rehabilitation  [address] [facility], [address] [place] [address]  Dept: [phone]  Dept Fax: [phone]        RE: Consultation for Mode Tao        Dear Zack Melo MD,    Thank you very much for the opportunity to see your patient. Attached please find a summary from your patient's recent visit. I appreciate the chance to take care of your patient with you. Please feel free to call me with any questions or concerns. Sincerely,        Gordan Hodgkins.  Michell Lopez MD  Electronically Signed on 1/3/2023

## (undated) NOTE — LETTER
[facility] FOR BLOOD OR BLOOD COMPONENTS      In the course of your treatment, it may become necessary to administer a transfusion of blood or blood components.  This form provides basic information concerning this proc explain the alternatives to you if it has not already been done. I,Mariaelena Rojo, have read/had read to me the above. I understand the matters bearing on the decision whether or not to authorize a transfusion of blood or blood components.  I have no ques

## (undated) NOTE — LETTER
[facility] SURGERY SCHEDULING FORM   [address] [facility]   [phone] (scheduling phone) [phone] (scheduling fax)     PATIENT INFORMATION   Last Name:      Parmjit Carcamo      First Name:    Suni Gabriel [x]  Yes  []  No or using our own   Allergies: Augmentin, [Amoxicillin-Pot Clavulanate]; Hornet Venom; Sulfa Antibiotics; Sulfa Drugs Cross Reactors;  Yellow Jacket Venom         Completed by:    Tannre Ovalle      Date:    5/3/2018

## (undated) NOTE — LETTER
[facility] SURGERY SCHEDULING FORM   [address] [facility]   [phone] (scheduling phone) [phone] (scheduling fax)     PATIENT INFORMATION   Last Name:      Janina Muñiz      First Name:    Adrian Heredia Allergies: Augmentin, [Amoxicillin-Pot Clavulanate]; Hornet Venom; Sulfa Antibiotics; Sulfa Drugs Cross Reactors; Yellow Jacket Venom    PATIENT WOULD LIKE TO BE LAST SLOT OF THE DAY     Completed by:     Chantel KAY      Date:    8/27/2018

## (undated) NOTE — LETTER
[facility] Testing Department  Phone: [phone]  OUTSIDE TESTING RESULT REQUEST      TO:   Dr. Eusebia Siddiqi Date: 11/18/19    FAX #: [phone]     IMPORTANT: FOR YOUR IMMEDIATE ATTENTION  Please FAX all test results listed below to: 6

## (undated) NOTE — LETTER
[facility] SURGERY SCHEDULING FORM   [address] [place] [address]   [phone] (scheduling phone) [phone] (scheduling fax)     PATIENT INFORMATION   Last Name:      Romayne Colt      First Name:    Anne Sandhoff []  No or using our own   Allergies: Augmentin, [Amoxicillin-Pot Clavulanate]; Hornet Venom; Sulfa Antibiotics; Sulfa Drugs Cross Reactors;  Yellow Jacket Venom         Completed by:    Elida Giraldo      Date:    8/8/2019

## (undated) NOTE — LETTER
5/5/2023      [place] A. Naoma Dance, MD  Physical Medicine and Rehabilitation  2010 [facility], [address] [place] [address]  Dept: [phone]  Dept Fax: [phone]        RE: Consultation for Arlin Garcia        Dear Abdullahi Sim MD,    Thank you very much for the opportunity to see your patient. Attached please find a summary from your patient's recent visit. I appreciate the chance to take care of your patient with you. Please feel free to call me with any questions or concerns. Sincerely,        Candace Lowry.  Naoma Dance, MD  Electronically Signed on 5/5/2023

## (undated) NOTE — LETTER
[facility] SURGERY SCHEDULING FORM   [address] [place] [address]   [phone] (scheduling phone) [phone] (scheduling fax)     PATIENT INFORMATION   Last Name:      Dev Albert      First Name:    Pb Brown []  No or using our own   Allergies: Augmentin, [Amoxicillin-Pot Clavulanate]; Hornet Venom; Sulfa Antibiotics; Sulfa Drugs Cross Reactors;  Yellow Jacket Venom         Completed by:    José Silverman      Date:    4/29/2019

## (undated) NOTE — Clinical Note
[facility] SURGERY SCHEDULING FORM   [address] [place] [address]   [phone] (scheduling phone) [phone] (scheduling fax)     PATIENT INFORMATION   Patient Name:    Ava Foster   :    1940 Completed by:    Jase Bey      Date:    5/8/2017    [facility] will follow its Pre-Admission Assessment and Screening Policy for pre-admission testing.   Physicians that require   additional testing must order this directly and have results faxed to [facility] at

## (undated) NOTE — LETTER
[facility] SURGERY SCHEDULING FORM   [address] [facility]   [phone] (scheduling phone) [phone] (scheduling fax)     PATIENT INFORMATION   Patient Name:    Kian Lee   :    1940 Antibiotics; Sulfa Drugs Cross Reactors; Yellow Jacket Venom       Completed by:    Rich Huerta      Date:    9/11/2017    [facility] will follow its Pre-Admission Assessment and Screening Policy for pre-admission testing.   Physicians that require   additional

## (undated) NOTE — LETTER
[facility] SURGERY SCHEDULING FORM   [address] [place] [address]   [phone] (scheduling phone) [phone] (scheduling fax)     PATIENT INFORMATION   Patient Name:    Devin Hernandez   :    1940 Antibiotics; Sulfa Drugs Cross Reactors; Yellow Jacket Venom       Completed by:    Emelina Serna      Date:    12/7/2017    [facility] will follow its Pre-Admission Assessment and Screening Policy for pre-admission testing.   Physicians that require   additional t

## (undated) NOTE — Clinical Note
[address] [facility], [address] [place] [address], [facility] [address] [place] [address] [phone]            May 8, 2017    The purpose of this Agreement is to prevent misunderstandings about certain medications you will be [place] character and intensity of my pain, the effect of the pain on my daily life, and how well medicine is helping to relieve pain.    _______ I will not use any illegal controlled substances, including marijuana, cocaine, etc., nor will I misuse or self-prescr Date                 Time                 Signature of Witness

## (undated) NOTE — LETTER
11/14/2022      Tami Vargas MD  Physical Medicine and Rehabilitation  2010 [facility], [address] [place] [address]  Dept: [phone]  Dept Fax: [phone]        RE: Consultation for Herber Oliva        Dear Patricia Cherry MD,    Thank you very much for the opportunity to see your patient. Attached please find a summary from your patient's recent visit. I appreciate the chance to take care of your patient with you. Please feel free to call me with any questions or concerns. Sincerely,        Christopher Wheatley MD  Electronically Signed on 11/14/2022

## (undated) NOTE — MR AVS SNAPSHOT
[facility]  [address], [address] [place] [address]  [address] [facility] [phone]               Thank you for choosing us for your health care visit with Sybil Collazo.  Floridalma Cm MD.  We are glad to serve you and happy to provide you with this summary of you BP Pulse Height Weight BMI    160/88 mmHg 71 62\" 132 lb 24.14 kg/m2         Current Medications          This list is accurate as of: 5/8/17  9:48 PM.  Always use your most recent med list.                ALPRAZolam 0.25 MG Tabs   TAKE ONE TABLET BY MOUT take each. Commonly known as:  Vita Simons   Start taking on:  7/7/2017           Ibandronate Sodium 150 MG Tabs   TAKE 1 TABLET BY MOUTH ONCE A MONTH IN THE MORNING WITH A FULL GLASS OF WATER.    Commonly known as:  BONIVA           Loperamide HCl 2 MG Caps

## (undated) NOTE — LETTER
8/9/2022      Sal[place] De Leon MD  Physical Medicine and Rehabilitation  2010 [facility], [address] [place] [address]  Dept: [phone]  Dept Fax: [phone]        RE: Consultation for Declan Thibodeaux        Dear Abby Pereyra MD,    Thank you very much for the opportunity to see your patient. Attached please find a summary from your patient's recent visit. I appreciate the chance to take care of your patient with you. Please feel free to call me with any questions or concerns. Sincerely,        Astrid [place].  Elisabeth De Leon MD  Electronically Signed on 8/9/2022

## (undated) NOTE — LETTER
[facility] SURGERY SCHEDULING FORM   [address] [place] [address]   [phone] (scheduling phone) [phone] (scheduling fax)     PATIENT INFORMATION   Last Name:      Danie Poon      First Name:    Mer Astorga Allergies: Augmentin, [Amoxicillin-Pot Clavulanate]; Hornet Venom; Sulfa Antibiotics; Sulfa Drugs Cross Reactors;  Yellow Jacket Venom  Request last appt of the day     Completed by:   Se Lynch      Date:   11/27/2018

## (undated) NOTE — LETTER
1/27/2023      Melanie Mckee MD  Physical Medicine and Rehabilitation  [address] [facility], [address] [place] [address]  Dept: [phone]  Dept Fax: [phone]        RE: Consultation for Jaron Vila        Dear Jaydon Singh MD,    Thank you very much for the opportunity to see your patient. Attached please find a summary from your patient's recent visit. I appreciate the chance to take care of your patient with you. Please feel free to call me with any questions or concerns. Sincerely,        Usha Lockhart.  Colt Mckee MD  Electronically Signed on 1/27/2023

## (undated) NOTE — LETTER
[address], [place]  Authorization for Invasive Procedures  1.  I hereby authorize  Dr. Jenae Allen  , my physician and whomever may be designated as the doctor's assistant, to perform the following operation and/or procedure: David Carcamo performed for the purposes of advancing medicine, science, and/or education, provided my identity is not revealed. If the procedure has been videotaped, the physician/surgeon will obtain the original videotape.  The hospital will not be responsible for stor My signature below affirms that prior to the time of the procedure, I have explained to the patient and/or her legal representative, the risks and benefits involved in the proposed treatment and any reasonable alternative to the proposed treatment.  I have

## (undated) NOTE — LETTER
8/12/2022      Juan Cavazos MD  Physical Medicine and Rehabilitation  2010 [facility], [address] [place] [address]  Dept: [phone]  Dept Fax: [phone]        RE: Consultation for Benita Bhat        Dear Maria Esther Dowd MD,    Thank you very much for the opportunity to see your patient. Attached please find a summary from your patient's recent visit. I appreciate the chance to take care of your patient with you. Please feel free to call me with any questions or concerns. Sincerely,        Macey Mcneal.  Jb Cavazos MD  Electronically Signed on 8/12/2022